# Patient Record
Sex: FEMALE | Race: BLACK OR AFRICAN AMERICAN | NOT HISPANIC OR LATINO | URBAN - METROPOLITAN AREA
[De-identification: names, ages, dates, MRNs, and addresses within clinical notes are randomized per-mention and may not be internally consistent; named-entity substitution may affect disease eponyms.]

---

## 2022-06-17 ENCOUNTER — INPATIENT (INPATIENT)
Facility: HOSPITAL | Age: 72
LOS: 93 days | Discharge: HOME | End: 2022-09-19
Attending: PSYCHIATRY & NEUROLOGY | Admitting: PSYCHIATRY & NEUROLOGY

## 2022-06-17 VITALS
RESPIRATION RATE: 17 BRPM | DIASTOLIC BLOOD PRESSURE: 84 MMHG | SYSTOLIC BLOOD PRESSURE: 162 MMHG | TEMPERATURE: 98 F | OXYGEN SATURATION: 100 % | HEART RATE: 77 BPM | HEIGHT: 65 IN

## 2022-06-17 DIAGNOSIS — F20.9 SCHIZOPHRENIA, UNSPECIFIED: ICD-10-CM

## 2022-06-17 LAB
ALBUMIN SERPL ELPH-MCNC: 3.9 G/DL — SIGNIFICANT CHANGE UP (ref 3.3–5)
ALP SERPL-CCNC: 86 U/L — SIGNIFICANT CHANGE UP (ref 40–120)
ALT FLD-CCNC: 11 U/L — SIGNIFICANT CHANGE UP (ref 4–33)
AMPHET UR-MCNC: NEGATIVE — SIGNIFICANT CHANGE UP
ANION GAP SERPL CALC-SCNC: 16 MMOL/L — HIGH (ref 7–14)
APAP SERPL-MCNC: <10 UG/ML — LOW (ref 15–25)
APPEARANCE UR: CLEAR — SIGNIFICANT CHANGE UP
AST SERPL-CCNC: 24 U/L — SIGNIFICANT CHANGE UP (ref 4–32)
B PERT DNA SPEC QL NAA+PROBE: SIGNIFICANT CHANGE UP
B PERT+PARAPERT DNA PNL SPEC NAA+PROBE: SIGNIFICANT CHANGE UP
BACTERIA # UR AUTO: NEGATIVE — SIGNIFICANT CHANGE UP
BARBITURATES UR SCN-MCNC: NEGATIVE — SIGNIFICANT CHANGE UP
BASOPHILS # BLD AUTO: 0.04 K/UL — SIGNIFICANT CHANGE UP (ref 0–0.2)
BASOPHILS NFR BLD AUTO: 0.8 % — SIGNIFICANT CHANGE UP (ref 0–2)
BENZODIAZ UR-MCNC: NEGATIVE — SIGNIFICANT CHANGE UP
BILIRUB SERPL-MCNC: 0.3 MG/DL — SIGNIFICANT CHANGE UP (ref 0.2–1.2)
BILIRUB UR-MCNC: NEGATIVE — SIGNIFICANT CHANGE UP
BORDETELLA PARAPERTUSSIS (RAPRVP): SIGNIFICANT CHANGE UP
BUN SERPL-MCNC: 15 MG/DL — SIGNIFICANT CHANGE UP (ref 7–23)
C PNEUM DNA SPEC QL NAA+PROBE: SIGNIFICANT CHANGE UP
CALCIUM SERPL-MCNC: 9.2 MG/DL — SIGNIFICANT CHANGE UP (ref 8.4–10.5)
CHLORIDE SERPL-SCNC: 104 MMOL/L — SIGNIFICANT CHANGE UP (ref 98–107)
CO2 SERPL-SCNC: 18 MMOL/L — LOW (ref 22–31)
COCAINE METAB.OTHER UR-MCNC: NEGATIVE — SIGNIFICANT CHANGE UP
COLOR SPEC: YELLOW — SIGNIFICANT CHANGE UP
CREAT SERPL-MCNC: 0.81 MG/DL — SIGNIFICANT CHANGE UP (ref 0.5–1.3)
CREATININE URINE RESULT, DAU: 161 MG/DL — SIGNIFICANT CHANGE UP
DIFF PNL FLD: NEGATIVE — SIGNIFICANT CHANGE UP
EGFR: 78 ML/MIN/1.73M2 — SIGNIFICANT CHANGE UP
EOSINOPHIL # BLD AUTO: 0.04 K/UL — SIGNIFICANT CHANGE UP (ref 0–0.5)
EOSINOPHIL NFR BLD AUTO: 0.8 % — SIGNIFICANT CHANGE UP (ref 0–6)
EPI CELLS # UR: 1 /HPF — SIGNIFICANT CHANGE UP (ref 0–5)
ETHANOL SERPL-MCNC: <10 MG/DL — SIGNIFICANT CHANGE UP
FLUAV SUBTYP SPEC NAA+PROBE: SIGNIFICANT CHANGE UP
FLUBV RNA SPEC QL NAA+PROBE: SIGNIFICANT CHANGE UP
GLUCOSE SERPL-MCNC: 95 MG/DL — SIGNIFICANT CHANGE UP (ref 70–99)
GLUCOSE UR QL: NEGATIVE — SIGNIFICANT CHANGE UP
HADV DNA SPEC QL NAA+PROBE: SIGNIFICANT CHANGE UP
HCOV 229E RNA SPEC QL NAA+PROBE: SIGNIFICANT CHANGE UP
HCOV HKU1 RNA SPEC QL NAA+PROBE: SIGNIFICANT CHANGE UP
HCOV NL63 RNA SPEC QL NAA+PROBE: SIGNIFICANT CHANGE UP
HCOV OC43 RNA SPEC QL NAA+PROBE: SIGNIFICANT CHANGE UP
HCT VFR BLD CALC: 35.8 % — SIGNIFICANT CHANGE UP (ref 34.5–45)
HGB BLD-MCNC: 11.3 G/DL — LOW (ref 11.5–15.5)
HMPV RNA SPEC QL NAA+PROBE: SIGNIFICANT CHANGE UP
HPIV1 RNA SPEC QL NAA+PROBE: SIGNIFICANT CHANGE UP
HPIV2 RNA SPEC QL NAA+PROBE: SIGNIFICANT CHANGE UP
HPIV3 RNA SPEC QL NAA+PROBE: SIGNIFICANT CHANGE UP
HPIV4 RNA SPEC QL NAA+PROBE: SIGNIFICANT CHANGE UP
HYALINE CASTS # UR AUTO: 0 /LPF — SIGNIFICANT CHANGE UP (ref 0–7)
IANC: 3.07 K/UL — SIGNIFICANT CHANGE UP (ref 1.8–7.4)
IMM GRANULOCYTES NFR BLD AUTO: 0.6 % — SIGNIFICANT CHANGE UP (ref 0–1.5)
KETONES UR-MCNC: NEGATIVE — SIGNIFICANT CHANGE UP
LEUKOCYTE ESTERASE UR-ACNC: ABNORMAL
LYMPHOCYTES # BLD AUTO: 1.2 K/UL — SIGNIFICANT CHANGE UP (ref 1–3.3)
LYMPHOCYTES # BLD AUTO: 24.6 % — SIGNIFICANT CHANGE UP (ref 13–44)
M PNEUMO DNA SPEC QL NAA+PROBE: SIGNIFICANT CHANGE UP
MCHC RBC-ENTMCNC: 29 PG — SIGNIFICANT CHANGE UP (ref 27–34)
MCHC RBC-ENTMCNC: 31.6 GM/DL — LOW (ref 32–36)
MCV RBC AUTO: 92 FL — SIGNIFICANT CHANGE UP (ref 80–100)
METHADONE UR-MCNC: NEGATIVE — SIGNIFICANT CHANGE UP
MONOCYTES # BLD AUTO: 0.5 K/UL — SIGNIFICANT CHANGE UP (ref 0–0.9)
MONOCYTES NFR BLD AUTO: 10.2 % — SIGNIFICANT CHANGE UP (ref 2–14)
NEUTROPHILS # BLD AUTO: 3.07 K/UL — SIGNIFICANT CHANGE UP (ref 1.8–7.4)
NEUTROPHILS NFR BLD AUTO: 63 % — SIGNIFICANT CHANGE UP (ref 43–77)
NITRITE UR-MCNC: NEGATIVE — SIGNIFICANT CHANGE UP
NRBC # BLD: 0 /100 WBCS — SIGNIFICANT CHANGE UP
NRBC # FLD: 0 K/UL — SIGNIFICANT CHANGE UP
OPIATES UR-MCNC: NEGATIVE — SIGNIFICANT CHANGE UP
OXYCODONE UR-MCNC: NEGATIVE — SIGNIFICANT CHANGE UP
PCP SPEC-MCNC: SIGNIFICANT CHANGE UP
PCP UR-MCNC: NEGATIVE — SIGNIFICANT CHANGE UP
PH UR: 7 — SIGNIFICANT CHANGE UP (ref 5–8)
PLATELET # BLD AUTO: 255 K/UL — SIGNIFICANT CHANGE UP (ref 150–400)
POTASSIUM SERPL-MCNC: 3.9 MMOL/L — SIGNIFICANT CHANGE UP (ref 3.5–5.3)
POTASSIUM SERPL-SCNC: 3.9 MMOL/L — SIGNIFICANT CHANGE UP (ref 3.5–5.3)
PROT SERPL-MCNC: 7.3 G/DL — SIGNIFICANT CHANGE UP (ref 6–8.3)
PROT UR-MCNC: ABNORMAL
RAPID RVP RESULT: SIGNIFICANT CHANGE UP
RBC # BLD: 3.89 M/UL — SIGNIFICANT CHANGE UP (ref 3.8–5.2)
RBC # FLD: 14 % — SIGNIFICANT CHANGE UP (ref 10.3–14.5)
RBC CASTS # UR COMP ASSIST: 1 /HPF — SIGNIFICANT CHANGE UP (ref 0–4)
RSV RNA SPEC QL NAA+PROBE: SIGNIFICANT CHANGE UP
RV+EV RNA SPEC QL NAA+PROBE: SIGNIFICANT CHANGE UP
SALICYLATES SERPL-MCNC: <0.3 MG/DL — LOW (ref 15–30)
SARS-COV-2 RNA SPEC QL NAA+PROBE: SIGNIFICANT CHANGE UP
SODIUM SERPL-SCNC: 138 MMOL/L — SIGNIFICANT CHANGE UP (ref 135–145)
SP GR SPEC: 1.03 — SIGNIFICANT CHANGE UP (ref 1–1.05)
THC UR QL: NEGATIVE — SIGNIFICANT CHANGE UP
TOXICOLOGY SCREEN, DRUGS OF ABUSE, SERUM RESULT: SIGNIFICANT CHANGE UP
TSH SERPL-MCNC: 1.04 UIU/ML — SIGNIFICANT CHANGE UP (ref 0.27–4.2)
UROBILINOGEN FLD QL: SIGNIFICANT CHANGE UP
WBC # BLD: 4.88 K/UL — SIGNIFICANT CHANGE UP (ref 3.8–10.5)
WBC # FLD AUTO: 4.88 K/UL — SIGNIFICANT CHANGE UP (ref 3.8–10.5)
WBC UR QL: 2 /HPF — SIGNIFICANT CHANGE UP (ref 0–5)

## 2022-06-17 PROCEDURE — 99285 EMERGENCY DEPT VISIT HI MDM: CPT | Mod: 25

## 2022-06-17 PROCEDURE — 93010 ELECTROCARDIOGRAM REPORT: CPT

## 2022-06-17 RX ORDER — OLANZAPINE 15 MG/1
2.5 TABLET, FILM COATED ORAL EVERY 6 HOURS
Refills: 0 | Status: DISCONTINUED | OUTPATIENT
Start: 2022-06-18 | End: 2022-06-24

## 2022-06-17 RX ORDER — OLANZAPINE 15 MG/1
2.5 TABLET, FILM COATED ORAL ONCE
Refills: 0 | Status: COMPLETED | OUTPATIENT
Start: 2022-06-17 | End: 2022-06-17

## 2022-06-17 RX ORDER — OLANZAPINE 15 MG/1
2.5 TABLET, FILM COATED ORAL AT BEDTIME
Refills: 0 | Status: DISCONTINUED | OUTPATIENT
Start: 2022-06-18 | End: 2022-06-19

## 2022-06-17 RX ORDER — OLANZAPINE 15 MG/1
1.25 TABLET, FILM COATED ORAL ONCE
Refills: 0 | Status: DISCONTINUED | OUTPATIENT
Start: 2022-06-18 | End: 2022-06-29

## 2022-06-17 RX ADMIN — OLANZAPINE 2.5 MILLIGRAM(S): 15 TABLET, FILM COATED ORAL at 21:07

## 2022-06-17 NOTE — ED BEHAVIORAL HEALTH ASSESSMENT NOTE - SUMMARY
Pt is a 72 year old woman, domiciled with daughter, no dependents, hx of paranoid schizophrenia, multiple prior psychiatric hospitalizations (most recent [facility] 4-5 months ago), hx of medication nonadherence, no hx of suicide attempts or suicidality, prior hx of aggression per daughter, no hx of substance abuse, BIB EMS activated by patient’s daughter when Pt was found throwing landlord's tools around home and verbally aggressive. Pt presents as very guarded and unreliable historian. MSE notable for an elevated, labile affect, +psychomotor agitation and involuntary movements (orofacial TD, repetitive fist clenching, finger and arm jerking), disorganized TP, preoccupied with paranoid beliefs that wolf is trying to get money from her and her daughter. Collateral information from daughter is concerning for imminent risk of harm to others given recent aggression towards landlord's tools, decline in ability to care for self (poor sleep and poor hygiene) in setting of medication nonadherence and paranoid delusions at baseline. Pt is at an imminent risk of harm to others and requires inpatient psychiatric hospitalization for stabilization.

## 2022-06-17 NOTE — ED BEHAVIORAL HEALTH ASSESSMENT NOTE - RISK ASSESSMENT
Pt has modifiable risk factors of active psychosis, aggression, agitation, noncompliant with meds, poor insight; static risk factors of multiple hospitalizations, hx of prior hospitalizations. Pt has protective factors of no hx of suicidality, no substance abuse. Pt is at an imminent risk of harm to others and requires inpatient psychiatric hospitalization for stabilization. Low Acute Suicide Risk

## 2022-06-17 NOTE — ED BEHAVIORAL HEALTH NOTE - BEHAVIORAL HEALTH NOTE
COVID Exposure Screen- collateral (i.e. third-party, chart review, belongings, etc; include EMS and ED staff), from Pt's daughter Ama Vargas ([phone])    1.	*Has the patient had a COVID-19 test in the last 90 days?  (  ) Yes   ( x ) No   (  ) Unknown- Reason: _____  IF YES PROCEED TO QUESTION #2. IF NO OR UNKNOWN, PLEASE SKIP TO QUESTION #3.  2.	Date of test(s) and result(s): ________  3.	*Has the patient tested positive for COVID-19 antibodies? (  ) Yes   ( x ) No   (  ) Unknown- Reason: _____  IF YES PROCEED TO QUESTION #4. IF NO or UNKNOWN, PLEASE SKIP TO QUESTION #5.  4.	Date of positive antibody test: ________  5.	*Has the patient received 2 doses of the COVID-19 vaccine? (  ) Yes   ( x ) No   (  ) Unknown- Reason: _____  IF YES PROCEED TO QUESTION #6. IF NO or UNKNOWN, PLEASE SKIP TO QUESTION #7.  6.	 Date of second dose: ________  7.	*In the past 10 days, has the patient been around anyone with a positive COVID-19 test?* (  ) Yes   ( x ) No   (  ) Unknown- Reason: __  IF YES PROCEED TO QUESTION #8. IF NO or UNKNOWN, PLEASE SKIP TO QUESTION #13.  8.	Was the patient within 6 feet of them for at least 15 minutes? (  ) Yes   (  ) No   (  ) Unknown- Reason: _____  9.	Did the patient provide care for them? (  ) Yes   (  ) No   (  ) Unknown- Reason: ______  10.	Did the patient have direct physical contact with them (touched, hugged, or kissed them)? (  ) Yes   (  ) No    (  ) Unknown- Reason: __  11.	Did the patient share eating or drinking utensils with them? (  ) Yes   (  ) No    (  ) Unknown- Reason: ____  12.	Did they sneeze, cough, or somehow get respiratory droplets on the patient? (  ) Yes   (  ) No    (  ) Unknown- Reason: ______  13.	*Has the patient been out of [place] within the past 10 days?* (  ) Yes   (  ) No   (  ) Unknown- Reason: _____  IF YES PLEASE ANSWER THE FOLLOWING QUESTIONS:  14.	Which state/country did they go to? ______  15.	Were they there over 24 hours? (  ) Yes   ( x ) No    (  ) Unknown- Reason: ______  16.	Date of return to [facility]: ______

## 2022-06-17 NOTE — ED PROVIDER NOTE - PROGRESS NOTE DETAILS
Rec'd signout on this pt from DEEPA Piña @ midnight:  73yo F w/ pmh paranoid schizophrenia, bibems for bizarre behavior, boarding for [facility] admission.  -Dr. Patricia

## 2022-06-17 NOTE — ED BEHAVIORAL HEALTH ASSESSMENT NOTE - VIOLENCE RISK FACTORS:
Feeling of being under threat and being unable to control threat/Impulsivity/Lack of insight into violence risk/need for treatment/Noncompliance with treatment

## 2022-06-17 NOTE — ED ADULT NURSE NOTE - NSIMPLEMENTINTERV_GEN_ALL_ED
Implemented All Fall Risk Interventions:  [place] to call system. Call bell, personal items and telephone within reach. Instruct patient to call for assistance. Room bathroom lighting operational. Non-slip footwear when patient is off stretcher. Physically safe environment: no spills, clutter or unnecessary equipment. Stretcher in lowest position, wheels locked, appropriate side rails in place. Provide visual cue, wrist band, yellow gown, etc. Monitor gait and stability. Monitor for mental status changes and reorient to person, place, and time. Review medications for side effects contributing to fall risk. Reinforce activity limits and safety measures with patient and family.

## 2022-06-17 NOTE — ED ADULT NURSE NOTE - OBJECTIVE STATEMENT
Pt arrived to  reporting that she became upset with her landlord because he didn't remove an extension cord and she almost tripped on it. Pt presents as cooperative, denies S/I H/I A/H V/H. Pt wanded for safety, changed into  clothing, personal property collected and logged.

## 2022-06-17 NOTE — ED PROVIDER NOTE - OBJECTIVE STATEMENT
71 y/o F hx Paranoia BIBA secondary to aggression and bizarre behaviour. Admits to medication non compliance.  Appears jv expressing. No evidence of  physical   injuries, broken skin or skin 71 y/o F hx Paranoia BIBA secondary to aggression and bizarre behaviour. Admits to medication non compliance.  Appears jv expressing. No evidence of  physical   injuries, broken skin or skin .  Denies SI/HI/AH/VH. Denies falling, punching or kicking any objects. Denies pain, SOB, fever, chills, cough, chest/ abdominal discomfort.  Denies use of alcohol or illicit drugs.

## 2022-06-17 NOTE — ED PROVIDER NOTE - CLINICAL SUMMARY MEDICAL DECISION MAKING FREE TEXT BOX
Medical evaluation performed. There is no clinical evidence of intoxication or any acute medical problem requiring immediate intervention. Patient is awaiting psychiatric consultation. Final disposition will be determined by psychiatrist. Labs, Urine Tox/UA , EKG  Medical evaluation performed. There is no clinical evidence of intoxication or any acute medical problem requiring immediate intervention. Patient is awaiting psychiatric consultation. Final disposition will be determined by psychiatrist.

## 2022-06-17 NOTE — ED BEHAVIORAL HEALTH ASSESSMENT NOTE - OTHER
involuntary movements, orofacial TD, fist closing, arm jerking did not assess, pt in bed pending bed availability [id number]

## 2022-06-17 NOTE — ED BEHAVIORAL HEALTH ASSESSMENT NOTE - PSYCHIATRIC ISSUES AND PLAN (INCLUDE STANDING AND PRN MEDICATION)
Start Zyprexa 2.5mg PO qhs for psychosis with least TD burden (consider Zyprexa Zydis at higher doses), melatonin 3mg qhs for sleep. PRNs for agitation: Zyprexa 2.5mg PO q6hr, Zyprexa 1.25 IM q6hr, Ativan 1mg PO q6hr (do not give IM Zyprexa with IM Ativan within 4 hrs due to risk of respiratory distress)

## 2022-06-17 NOTE — ED BEHAVIORAL HEALTH ASSESSMENT NOTE - OTHER PAST PSYCHIATRIC HISTORY (INCLUDE DETAILS REGARDING ONSET, COURSE OF ILLNESS, INPATIENT/OUTPATIENT TREATMENT)
multiple hospitalizations, no prior suicide attempts, daughter states last hospitalization 5-6 months ago in [place]

## 2022-06-17 NOTE — ED BEHAVIORAL HEALTH ASSESSMENT NOTE - DESCRIPTION
In the ED, Pt has been calm, talks loudly    Vital Signs Last 24 Hrs  T(C): 36.7 (17 Jun 2022 14:23), Max: 36.7 (17 Jun 2022 14:23)  T(F): 98 (17 Jun 2022 14:23), Max: 98 (17 Jun 2022 14:23)  HR: 77 (17 Jun 2022 14:23) (77 - 77)  BP: 162/84 (17 Jun 2022 14:23) (162/84 - 162/84)  BP(mean): --  RR: 17 (17 Jun 2022 14:23) (17 - 17)  SpO2: 100% (17 Jun 2022 14:23) (100% - 100%) lives with daughter denies

## 2022-06-17 NOTE — ED BEHAVIORAL HEALTH ASSESSMENT NOTE - CASE SUMMARY
72F with schizophrenia, non adherent to treatment, presents with EMS called by daughter for paranoia and agitation. Patient presents as loud, labile, tangential, though reasonably cooperative and tolerant of being interrupted for clarification. She reports issues with her landlord, thinking he is messing with her so her daughter gives him more money. Patient denies any psychiatric history or current symptoms. Per daughter, pt is not sleeping, hygiene is poor, and is increasingly paranoid and agitated, today began unplugging and throwing the landlord's power tools unprovoked. She cannot care for self due to psychosis warranting involuntary admission, pending merced unit assignment. I agree with note/plan above, recommend Zyprexa due to concern for other meds worsening TD/EPS. Does not require 1:1 in locked supervised setting at this time.

## 2022-06-17 NOTE — ED BEHAVIORAL HEALTH ASSESSMENT NOTE - HPI (INCLUDE ILLNESS QUALITY, SEVERITY, DURATION, TIMING, CONTEXT, MODIFYING FACTORS, ASSOCIATED SIGNS AND SYMPTOMS)
Pt is a 72 year old woman, domiciled with daughter, no dependents, hx of paranoid schizophrenia, multiple prior psychiatric hospitalizations (most recent [facility] 4-5 months ago), hx of medication nonadherence, no hx of suicide attempts or suicidality, prior hx of aggression per daughter, no hx of substance abuse, BIB EMS activated by patient’s daughter when Pt was found throwing landlord's tools around home and verbally aggressive.    On exam, Pt was AAOx3 however very guarded about circumstances prior to evaluation and a limited historian about her past psychiatric history. Pt presents with an elevated, labile affect, +psychomotor agitation and involuntary movements (orofacial TD, repetitive fist clenching, finger and arm jerking). Pt is preoccupied with paranoid beliefs that wolf is trying to get money from her and her daughter, however denies any physical altercations with him. Pt does not think landlord wants to kill her. She denies any violent ideation, intent, or plan towards him. Pt tells writer that she wants to get a 's license but landlord keeps her from doing so somehow, and that she cannot find her shoe. Pt reports mood as "happy". Denies feeling depressed or anxious. Denies any sleep or appetite changes. Did not report any jv sx or perceptual disturbances. Denies any suicidal ideation, intent, or plan. Pt denies prior inpatient psych admissions, outpatient psychiatric tx. She denies med trials, but mentions "they tried to make me takes meds" then declined to explain this. When asked about her social history, Pt states she did not want to specify what benefits she receives, or where she is from besides from the [facility]. States she takes care of her ADLs, pays her own bills, cooks and showers. Denies any physical pain, Pt explains her involuntary movements are due to her feeling tired.     Collateral information from daughter provides contradicting history, see separate  note, of note: "Daughter reports when patient has an episode, she screams, belligerent, is confrontational and was carrying on being verbally aggressive. Daughter says the landlord called her daughter because pt was screaming at the top of her lungs throwing the landlord supplies and tools throwing it around. Patient was saying that the landlord is trying to hurt her. Daughter says patient has these episodes but the patient was not able to be redirected." Confirms that Pt is noncompliant with medications for years, and tends to discontinue meds whenever discharged.

## 2022-06-17 NOTE — ED BEHAVIORAL HEALTH NOTE - BEHAVIORAL HEALTH NOTE
As per request of provider, writer contacted patient’s daughter Ama Vargas ([phone]) for collateral information. The following information is per daughter.    Patient is a 71 Yo female domiciled with daughter. Patient BIB EMS activated by patient’s daughter due to patient experiencing an episode that was not redirectable. Daughter reports when patient has an episode, she screams, belligerent, is confrontational and was carrying on being verbally aggressive. Daughter says the landlord called her daughter because pt was screaming at the top of her lungs throwing the landlord supplies and tools throwing it around. Patient was saying that the landlord is trying to hurt her. Daughter says patient has these episodes but the patient was not able to be redirected. Patient has a hx of paranoid schizophrenia and is non compliant with treatment/mediation. Daughter says the patient has these conspiracy theories and says people are plotting against her trying to attack her. Patient has a hx of hospitalizations with most recent hospitalization at [facility] 4-5 months ago. Daughter says whenever the patient leaves the hospital she throws out her meds and the daughter isn’t able to administer the patients medication.  Daughter says patient has not been in outpatient treatment in years. Daughter reports the patient did not endorse any Si/Hi/AVH but did state to her landlord “you don’t want to mess with me”. Daughter says the patient has a hx of aggression. Patient’s sleep is poor, hygiene is poor and appetite is normal. Daughter says she is unsure if the patient has any medical problems because she refuses to see a doctor. Daughter notices the patient vomiting sometimes after she eats. Patient is not covid vaccinated. Daughter is hopeful for the patient to get a medical workup and believes patient needs psych meds. When patient is on medication, she is still paranoid but not aggressive. Writer agreed to keep the daughter updated. As per request of provider, writer contacted patient’s daughter Ama Vargas ([phone]) for collateral information. The following information is per daughter.    Patient is a 73 Yo female domiciled with daughter. Patient BIB EMS activated by patient’s daughter due to patient experiencing an episode that was not redirectable. Daughter reports when patient has an episode, she screams, belligerent, is confrontational and was carrying on being verbally aggressive. Daughter says the landlord called her daughter because pt was screaming at the top of her lungs throwing the landlord supplies and tools throwing it around. Patient was saying that the landlord is trying to hurt her. Daughter says patient has these episodes but the patient was not able to be redirected. Patient has a hx of paranoid schizophrenia and is non compliant with treatment/mediation. Daughter says the patient has these conspiracy theories and says people are plotting against her trying to attack her. Patient has a hx of hospitalizations with most recent hospitalization at [facility] 4-5 months ago. Daughter says whenever the patient leaves the hospital she throws out her meds and the daughter isn’t able to administer the patients medication.  Daughter says patient has not been in outpatient treatment in years. Daughter reports the patient did not endorse any Si/Hi/AVH but did state to her landlord “you don’t want to mess with me”. Daughter says the patient has a hx of aggression. Patient’s sleep is poor, hygiene is poor and appetite is normal. Daughter says she is unsure if the patient has any medical problems because she refuses to see a doctor. Daughter notices the patient vomiting sometimes after she eats. Patient is not covid vaccinated. Daughter is hopeful for the patient to get a medical workup and believes patient needs psych meds. When patient is on medication, she is still paranoid but not aggressive. Writer informed the daughter that patient would be admitted psychiatrically and is currently boarding.

## 2022-06-17 NOTE — ED ADULT TRIAGE NOTE - CHIEF COMPLAINT QUOTE
Pt from home hx of paranoid schizophrenia non compliant with medication brought in by EMS after daughter called starting pt has been acting "irate" As per EMS pt was uncooperative. Pt calm  in triage. Pt denies si/hi/vh/ah

## 2022-06-17 NOTE — ED BEHAVIORAL HEALTH ASSESSMENT NOTE - NSSUICPROTFACT_PSY_ALL_CORE
Physical Therapy & Occupational Therapy  Attempt Note/No Treatment  Pt gone for EGD with anethesia at this time. Will follow up 9/29 as able.     Galilea Smith, [place] [address]  Cassandra Stevenson Responsibility to children, family, or others/Identifies reasons for living/Supportive social network of family or friends

## 2022-06-18 DIAGNOSIS — F29 UNSPECIFIED PSYCHOSIS NOT DUE TO A SUBSTANCE OR KNOWN PHYSIOLOGICAL CONDITION: ICD-10-CM

## 2022-06-18 LAB
COVID-19 SPIKE DOMAIN AB INTERP: POSITIVE
COVID-19 SPIKE DOMAIN ANTIBODY RESULT: 74.5 U/ML — HIGH
SARS-COV-2 IGG+IGM SERPL QL IA: 74.5 U/ML — HIGH
SARS-COV-2 IGG+IGM SERPL QL IA: POSITIVE

## 2022-06-18 PROCEDURE — 99221 1ST HOSP IP/OBS SF/LOW 40: CPT

## 2022-06-18 NOTE — BH INPATIENT PSYCHIATRY ASSESSMENT NOTE - HPI (INCLUDE ILLNESS QUALITY, SEVERITY, DURATION, TIMING, CONTEXT, MODIFYING FACTORS, ASSOCIATED SIGNS AND SYMPTOMS)
Pt is a 72 year old woman, domiciled with daughter, no dependents, no known past medical history, no substance abuse,  hx of paranoid schizophrenia, multiple prior psychiatric hospitalizations (most recent [facility] 4-5 months ago), hx of medication nonadherence, no hx of suicide attempts or suicidality, prior hx of aggression per daughter, no hx of substance abuse, BIB EMS activated by patient’s daughter when Pt was found throwing landlord's tools around home and verbally aggressive.       Pt is a 72 year old woman, domiciled with daughter, no dependents, no known past medical history, no substance abuse,  hx of paranoid schizophrenia, multiple prior psychiatric hospitalizations (was seen in [facility] ED 2016 with presentation very similar to current, possibly more recent hospitalization at [facility]  several months ago), hx of medication nonadherence, no hx of suicide attempts or suicidality, prior hx of aggression per daughter, BIB EMS to [facility] 6/17 activated by patient’s daughter when pt was found throwing [employer]'s tools around home and verbally aggressive. Collateral reported patient non-adherent with treatment, increasingly psychotic. Patient was calm in the ED, but delusional, believing that landlord has been trying to prevent her from getting a 's license, other   delusions of control. Was admitted on 9.39, olanzapine 2.5 mg QHS started.

## 2022-06-18 NOTE — BH INPATIENT PSYCHIATRY ASSESSMENT NOTE - CURRENT MEDICATION
MEDICATIONS  (STANDING):  OLANZapine 2.5 milliGRAM(s) Oral at bedtime    MEDICATIONS  (PRN):  LORazepam     Tablet 1 milliGRAM(s) Oral every 6 hours PRN Agitation  OLANZapine 2.5 milliGRAM(s) Oral every 6 hours PRN agitation  OLANZapine Injectable 1.25 milliGRAM(s) IntraMuscular Once PRN severe agitation

## 2022-06-18 NOTE — PSYCHIATRIC REHAB INITIAL EVALUATION - NSBHPRRECOMMEND_PSY_ALL_CORE
Per medical records, Pt is a 72 year old woman, domiciled with daughter, with an admitting diagnosis of schizophrenia. Pt has a history of multiple psychiatric admissions, most recently at [facility] 4-5 months ago. Pt has a prior hx of aggression per collateral of daughter. Pt was BIB EMS which was activated by patient’s daughter due to verbal aggression and pt throwing landlord's tools around home. Pt presents with noted psychomotor agitation and involuntary movements (orofacial TD, repetitive fist clenching, finger and arm jerking). Writer met with patient and introduced self, psychiatric rehabilitation staff and services.  Patient was noted to be disorganized, hyperverbal, and focused on discharge home. She reports no plans to take medication, often stating she is fine. Pt expressed paranoia towards landlord.  Patient demonstrated poor impulse control during assessment, displayed poor insight and poor judgment into her symptoms and treatment at this time.  Writer encouraged patient to attend psychiatric rehabilitation activities and engage in treatment.  Psychiatric Rehabilitation staff will continue to engage patient daily in order to develop therapeutic rapport. Writer and patient were unable to establish a collaborative psychiatric rehabilitation goal, therefore one will be selected for her.

## 2022-06-18 NOTE — BH INPATIENT PSYCHIATRY ASSESSMENT NOTE - MSE UNSTRUCTURED FT
Patient is awake and alert. Affect is very guarded. Speech is fluent. Somewhat restless, also athetosis? truncal TD? Speech fluent. +paranoid.   Insists that landlord wants to hurt her. Reports "I don't need any medications." Poor insight. No suicidal ideations.

## 2022-06-18 NOTE — BH INPATIENT PSYCHIATRY ASSESSMENT NOTE - NSBHASSESSSUMMFT_PSY_ALL_CORE
72 year-old with paranoid schizophrenia and possibly TD with acute psychotic decompensation 72 year-old with no past medical history, no substance misuse, history of paranoid schizophrenia and possibly TD with acute psychotic decompensation  in the context of medication non-adherence, admitted on 9.39 to [facility], olanzapine 2.5 mg HS started  Will continue olanzapine, increase to 2.5 bid tomorrow

## 2022-06-18 NOTE — BH INPATIENT PSYCHIATRY ASSESSMENT NOTE - OTHER PAST PSYCHIATRIC HISTORY (INCLUDE DETAILS REGARDING ONSET, COURSE OF ILLNESS, INPATIENT/OUTPATIENT TREATMENT)
multiple hospitalizations, no prior suicide attempts, daughter states last hospitalization 5-6 months ago in [place]

## 2022-06-18 NOTE — BH INPATIENT PSYCHIATRY ASSESSMENT NOTE - NSBHMETABOLIC_PSY_ALL_CORE_FT
BMI: BMI (kg/m2): 19.4 (06-18-22 @ 09:59)  HbA1c:   Glucose:   BP: 165/77 (06-18-22 @ 09:59) (153/92 - 165/77)  Lipid Panel:

## 2022-06-18 NOTE — BH INPATIENT PSYCHIATRY ASSESSMENT NOTE - NSBHCHARTREVIEWVS_PSY_A_CORE FT
Vital Signs Last 24 Hrs  T(C): 35.9 (06-18-22 @ 15:59), Max: 36.9 (06-17-22 @ 19:00)  T(F): 96.6 (06-18-22 @ 15:59), Max: 98.4 (06-17-22 @ 19:00)  HR: 65 (06-18-22 @ 09:59) (57 - 66)  BP: 165/77 (06-18-22 @ 09:59) (153/92 - 165/77)  BP(mean): --  RR: 18 (06-18-22 @ 09:59) (16 - 18)  SpO2: 100% (06-18-22 @ 06:50) (98% - 100%)     Vital Signs Last 24 Hrs  T(C): 35.9 (06-18-22 @ 15:59), Max: 36.9 (06-18-22 @ 06:50)  T(F): 96.6 (06-18-22 @ 15:59), Max: 98.4 (06-18-22 @ 06:50)  HR: 65 (06-18-22 @ 09:59) (57 - 65)  BP: 165/77 (06-18-22 @ 09:59) (158/87 - 165/77)  BP(mean): --  RR: 18 (06-18-22 @ 09:59) (16 - 18)  SpO2: 100% (06-18-22 @ 06:50) (100% - 100%)

## 2022-06-18 NOTE — BH INPATIENT PSYCHIATRY ASSESSMENT NOTE - RISK ASSESSMENT
Pt has modifiable risk factors of active psychosis, aggression, agitation, noncompliant with meds, poor insight; static risk factors of multiple hospitalizations, hx of prior hospitalizations. Pt has protective factors of no hx of suicidality, no substance abuse. Pt is at an imminent risk of harm to others and requires inpatient psychiatric hospitalization for stabilization.

## 2022-06-18 NOTE — BH INPATIENT PSYCHIATRY ASSESSMENT NOTE - NSBHCONSULTIPREASON_PSY_A_CORE
danger to self; mental illness expected to respond to inpatient care <<-----Click here for Discharge Medication Review

## 2022-06-18 NOTE — PSYCHIATRIC REHAB INITIAL EVALUATION - NSBHSTRENGTHHOME_PSY_ALL_CORE
Pt reports being independent with all daily activities including shopping and cooking.  However, per chart/collateral of daughter, pt has experienced a decline in her ability to care for self (poor sleep and poor hygiene).

## 2022-06-18 NOTE — BH PATIENT PROFILE - NSDASAVERBSTAFF_PSY_ALL_CORE
87 yo M with a PMH ESRD on HD (MWF), AAA s/p repair, CAD s/p stents (lastplaced in 03/2014), atrial fibrillation (not on A/C 2/2 falls), HFpEF (last EF 68%) s/p PPM and ablation 2016, severe aortic stenosis, severe mitral regurgitation, depression, hypothyroidism, HTN, and DLBCL currently on Rituximab, recently admitted (9/17) for increased fluid leakage from pleurx catheter p/w fall/?syncope at home with hypotension, a/w likely septic shock 2/2 empyema, c/b MRSA bacteremia.     Neuro: AMS 2/2 sepsis/metabolic derangement. Currently improved, but mental status continues to wax and wane, likely 2/2 delirium. PT did not require zyprexa or sedation overnight.     - treat underlying infection w/ Vanc and Zosyn (renally dosed)  - Consider Zyprexa if pt becomes agitated  - Monitor neuro status  - avoid sedating agents    Resp: Currently on room air; Pt placed on 2L NC while sleeping; persistent pleural effusions.   - pleurex gram stain + GPC; awaiting culture data   - Pleurex cath found out, site bandaged, with minor fluid leakage, c/t monitor site   - Per thoracic, consider sonosite for possible pigtail cath placement for pleural drainage  - dc'd Symbicort and Spiriva and switched to Pulmicort due to Pt's inability to follow commands yesterday; consider switching back to spiriva/symbicort, incentive spirometry, Chest PT, keep O2 sat 90%  - Daily CXR    CV:   - septic shock 2/2 MRSA bacteremia, likely originating from infected pleural fluid; c/w levo to maintain MAP>65, increased midodrine to 30TID, Pt now off of Levo  - pt with severe AS and MR on previous TTE with severe pulm HTN - gentle IVF as needed only to prevent pulmonary edema  - Afib s/p PPM (controlled, not on AC due to falls, not on BB)  - No vegetations found on TTE      GI:   - c/w renal diet, tolerating well   - Continue to monitor mental status for risk of aspiration.    Renal: ESRD on HD  - HD per renal  - Per renal recs: continue epogen 4000 units TIW     ID:   - septic shock 2/2 MRSA bacteremia, likely source is empyema   - c/w Vanc/ Zosyn IVPB (day 5), will consider dcing zosyn pending results of pleural cx  - pleural gram stain +GPCs, cx pending     Heme/Onc:   - leukocytosis resolved  - pt with chronic anemia, likely 2/2 anemia of chronic disease/ESRD - c/t monitor, c/w epogen; transfuse Hb <7.0  - mild thrombocytopenia, likely in setting of sepsis - c/t monitor      - DLBCL - f/u heme/onc as outpatient    Psych: A&O x1  - continue to monitor mental status.   - avoid sedation    DVT:   - Hep Sq  - SCDs No

## 2022-06-18 NOTE — ED BEHAVIORAL HEALTH NOTE - BEHAVIORAL HEALTH NOTE
Pt comfortable and sleeping, no PRNS given. She received a standing dose of Zyprexa 2.5mg po at 9:07pm.     Vital Signs Last 24 Hrs  T(C): 36.9 (17 Jun 2022 19:00), Max: 36.9 (17 Jun 2022 19:00)  T(F): 98.4 (17 Jun 2022 19:00), Max: 98.4 (17 Jun 2022 19:00)  HR: 66 (17 Jun 2022 19:00) (66 - 77)  BP: 153/92 (17 Jun 2022 19:00) (153/92 - 162/84)  BP(mean): --  RR: 16 (17 Jun 2022 19:00) (16 - 17)  SpO2: 98% (17 Jun 2022 19:00) (98% - 100%)    Patient will be transferred to  at [facility] at 7am on 9.39.   No 1:1 needed.   Hold orders placed.   RN to give report prior to transfer.

## 2022-06-19 LAB
A1C WITH ESTIMATED AVERAGE GLUCOSE RESULT: 5.9 % — HIGH (ref 4–5.6)
CHOLEST SERPL-MCNC: 165 MG/DL — SIGNIFICANT CHANGE UP
ESTIMATED AVERAGE GLUCOSE: 123 — SIGNIFICANT CHANGE UP
HDLC SERPL-MCNC: 64 MG/DL — SIGNIFICANT CHANGE UP
LIPID PNL WITH DIRECT LDL SERPL: 89 MG/DL — SIGNIFICANT CHANGE UP
NON HDL CHOLESTEROL: 101 MG/DL — SIGNIFICANT CHANGE UP
TRIGL SERPL-MCNC: 58 MG/DL — SIGNIFICANT CHANGE UP

## 2022-06-19 PROCEDURE — 99231 SBSQ HOSP IP/OBS SF/LOW 25: CPT

## 2022-06-19 RX ORDER — OLANZAPINE 15 MG/1
2.5 TABLET, FILM COATED ORAL
Refills: 0 | Status: DISCONTINUED | OUTPATIENT
Start: 2022-06-19 | End: 2022-06-29

## 2022-06-19 NOTE — BH INPATIENT PSYCHIATRY PROGRESS NOTE - PRN MEDS
MEDICATIONS  (PRN):  LORazepam     Tablet 1 milliGRAM(s) Oral every 6 hours PRN Agitation  OLANZapine 2.5 milliGRAM(s) Oral every 6 hours PRN agitation  OLANZapine Injectable 1.25 milliGRAM(s) IntraMuscular Once PRN severe agitation
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
## 2022-06-19 NOTE — BH INPATIENT PSYCHIATRY PROGRESS NOTE - NSBHASSESSSUMMFT_PSY_ALL_CORE
72 year-old with history of schizophrenia with poor medication adherence presenting with paranoid ideations concerning landlord that are consistent  with previous presentations, remains paranoid, mildly orthostatic  Encourage po intake  Increase olanzapine to 2.5 mg bid for psychosis

## 2022-06-19 NOTE — BH INPATIENT PSYCHIATRY PROGRESS NOTE - NSBHMETABOLIC_PSY_ALL_CORE_FT
BMI: BMI (kg/m2): 19.4 (06-18-22 @ 09:59)  HbA1c:   Glucose:   BP: 165/77 (06-18-22 @ 09:59) (153/92 - 165/77)  Lipid Panel: Date/Time: 06-19-22 @ 08:45  Cholesterol, Serum: 165  Direct LDL: --  HDL Cholesterol, Serum: 64  Total Cholesterol/HDL Ration Measurement: --  Triglycerides, Serum: 58

## 2022-06-19 NOTE — BH INPATIENT PSYCHIATRY PROGRESS NOTE - CURRENT MEDICATION
MEDICATIONS  (STANDING):  OLANZapine 2.5 milliGRAM(s) Oral two times a day    MEDICATIONS  (PRN):  LORazepam     Tablet 1 milliGRAM(s) Oral every 6 hours PRN Agitation  OLANZapine 2.5 milliGRAM(s) Oral every 6 hours PRN agitation  OLANZapine Injectable 1.25 milliGRAM(s) IntraMuscular Once PRN severe agitation

## 2022-06-19 NOTE — BH INPATIENT PSYCHIATRY PROGRESS NOTE - NSBHCHARTREVIEWVS_PSY_A_CORE FT
Vital Signs Last 24 Hrs  T(C): 36.7 (06-19-22 @ 08:29), Max: 36.7 (06-19-22 @ 08:29)  T(F): 98 (06-19-22 @ 08:29), Max: 98 (06-19-22 @ 08:29)  HR: --  BP: --  BP(mean): --  RR: --  SpO2: 99% (06-19-22 @ 08:29) (99% - 99%)    Orthostatic VS  06-19-22 @ 08:29  Lying BP: 116/89 HR: 67  Sitting BP: 144/69 HR: 84  Standing BP: --/-- HR: --  Site: --  Mode: --  Orthostatic VS  06-18-22 @ 21:00  Lying BP: --/-- HR: --  Sitting BP: 138/76 HR: 64  Standing BP: 118/87 HR: 68  Site: --  Mode: --

## 2022-06-19 NOTE — BH INPATIENT PSYCHIATRY PROGRESS NOTE - NSBHFUPINTERVALHXFT_PSY_A_CORE
Patient remains disorganized, paranoid about her landlord, not agitated or aggressive, mildly orthostatic but not complaining of dizziness

## 2022-06-20 DIAGNOSIS — G24.01 DRUG INDUCED SUBACUTE DYSKINESIA: ICD-10-CM

## 2022-06-20 PROCEDURE — 99233 SBSQ HOSP IP/OBS HIGH 50: CPT

## 2022-06-20 NOTE — BH SOCIAL WORK INITIAL PSYCHOSOCIAL EVALUATION - NSBHBARRIERS_PSY_ALL_CORE
Lack of insight/Poor judgement Lack of insight/Low motivation/Non-compliant with treatment/Poor judgement

## 2022-06-20 NOTE — BH SOCIAL WORK INITIAL PSYCHOSOCIAL EVALUATION - NSBHTREATHXDATEFT_PSY_ALL_CORE
daughter states yearly in patient and various ER visits to [facility] and [facility]  No dates available

## 2022-06-20 NOTE — BH INPATIENT PSYCHIATRY PROGRESS NOTE - MSE UNSTRUCTURED FT
Appearance: Thin [place], African american female with short grey hair who is poorly groomed with poor hygiene (foul smelling). Patient presents with severe tardive dyskenisia (chronic) involving upper arms (worsened when walking), toungue/lips  Behavior: Disorganized behavior, seen talking to herself. Tends to ask same question again and again, following staff requesting discharge.   Speech: Patient is difficult to understand due to TD involving lips/toungue. Presents with pressured speech, difficult to interrupt.    Thought process: Illogical, disorganized in thought process and difficult to follow. perseverates about landlord harassing her.   Thought content: denies SIIP or HIIP. Delusions about landlord harassing her (severe in intensity/frequency - does not discuss any other topics with staff and perseverated over this delusion)  Mood: "I'm fine"  Affect: Labile, Range was Flat. Congruent with mood.  Insight: Limited at baseline   Judgement: Poor  Impulse control: Poor

## 2022-06-20 NOTE — BH INPATIENT PSYCHIATRY PROGRESS NOTE - NSBHCHARTREVIEWVS_PSY_A_CORE FT
Vital Signs Last 24 Hrs  T(C): 36 (06-20-22 @ 08:02), Max: 36.2 (06-19-22 @ 21:04)  T(F): 96.8 (06-20-22 @ 08:02), Max: 97.2 (06-19-22 @ 21:04)  HR: 75 (06-19-22 @ 21:04) (75 - 75)  BP: 140/58 (06-19-22 @ 21:04) (140/58 - 140/58)  BP(mean): --  RR: --  SpO2: 99% (06-19-22 @ 21:04) (99% - 99%)    Orthostatic VS  06-20-22 @ 08:02  Lying BP: --/-- HR: --  Sitting BP: 133/75 HR: 79  Standing BP: 131/68 HR: 89  Site: upper right arm  Mode: electronic  Orthostatic VS  06-19-22 @ 08:29  Lying BP: 116/89 HR: 67  Sitting BP: 144/69 HR: 84  Standing BP: --/-- HR: --  Site: --  Mode: --  Orthostatic VS  06-18-22 @ 21:00  Lying BP: --/-- HR: --  Sitting BP: 138/76 HR: 64  Standing BP: 118/87 HR: 68  Site: --  Mode: --   Vital Signs Last 24 Hrs  T(C): 37.5 (06-20-22 @ 15:52), Max: 37.5 (06-20-22 @ 15:52)  T(F): 99.5 (06-20-22 @ 15:52), Max: 99.5 (06-20-22 @ 15:52)  HR: 75 (06-19-22 @ 21:04) (75 - 75)  BP: 140/58 (06-19-22 @ 21:04) (140/58 - 140/58)  BP(mean): --  RR: --  SpO2: 99% (06-19-22 @ 21:04) (99% - 99%)    Orthostatic VS  06-20-22 @ 08:02  Lying BP: --/-- HR: --  Sitting BP: 133/75 HR: 79  Standing BP: 131/68 HR: 89  Site: upper right arm  Mode: electronic  Orthostatic VS  06-19-22 @ 08:29  Lying BP: 116/89 HR: 67  Sitting BP: 144/69 HR: 84  Standing BP: --/-- HR: --  Site: --  Mode: --  Orthostatic VS  06-18-22 @ 21:00  Lying BP: --/-- HR: --  Sitting BP: 138/76 HR: 64  Standing BP: 118/87 HR: 68  Site: --  Mode: --
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
## 2022-06-20 NOTE — BH INPATIENT PSYCHIATRY PROGRESS NOTE - NSBHMETABOLIC_PSY_ALL_CORE_FT
BMI: BMI (kg/m2): 19.4 (06-18-22 @ 09:59)  HbA1c: A1C with Estimated Average Glucose Result: 5.9 % (06-19-22 @ 08:45)    Glucose:   BP: 140/58 (06-19-22 @ 21:04) (140/58 - 165/77)  Lipid Panel: Date/Time: 06-19-22 @ 08:45  Cholesterol, Serum: 165  Direct LDL: --  HDL Cholesterol, Serum: 64  Total Cholesterol/HDL Ration Measurement: --  Triglycerides, Serum: 58

## 2022-06-20 NOTE — ED BEHAVIORAL HEALTH NOTE - BEHAVIORAL HEALTH NOTE
Worker called [employer] ([phone]) and spoke to Cindy PEREZ who authorized days of 6/18-6/20 with review on 6/21 and reviewer to be determined. Auth #[id number].
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
## 2022-06-21 PROCEDURE — 99233 SBSQ HOSP IP/OBS HIGH 50: CPT

## 2022-06-21 NOTE — PHYSICAL THERAPY INITIAL EVALUATION ADULT - PLANNED THERAPY INTERVENTIONS, PT EVAL
balance training/gait training/motor coordination training/neuromuscular re-education/strengthening/transfer training

## 2022-06-21 NOTE — BH INPATIENT PSYCHIATRY PROGRESS NOTE - NSBHMETABOLIC_PSY_ALL_CORE_FT
BMI: BMI (kg/m2): 19.4 (06-18-22 @ 09:59)  HbA1c: A1C with Estimated Average Glucose Result: 5.9 % (06-19-22 @ 08:45)    Glucose:   BP: 140/58 (06-19-22 @ 21:04) (140/58 - 140/58)  Lipid Panel: Date/Time: 06-19-22 @ 08:45  Cholesterol, Serum: 165  Direct LDL: --  HDL Cholesterol, Serum: 64  Total Cholesterol/HDL Ration Measurement: --  Triglycerides, Serum: 58
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
## 2022-06-21 NOTE — BH INPATIENT PSYCHIATRY PROGRESS NOTE - NSBHCHARTREVIEWVS_PSY_A_CORE FT
Vital Signs Last 24 Hrs  T(C): 36.2 (06-21-22 @ 08:08), Max: 37.5 (06-20-22 @ 15:52)  T(F): 97.2 (06-21-22 @ 08:08), Max: 99.5 (06-20-22 @ 15:52)  HR: --  BP: --  BP(mean): --  RR: --  SpO2: --    Orthostatic VS  06-21-22 @ 08:08  Lying BP: --/-- HR: --  Sitting BP: 117/74 HR: 83  Standing BP: --/-- HR: --  Site: --  Mode: --  Orthostatic VS  06-20-22 @ 20:42  Lying BP: --/-- HR: --  Sitting BP: 125/81 HR: 71  Standing BP: --/-- HR: --  Site: --  Mode: --  Orthostatic VS  06-20-22 @ 08:02  Lying BP: --/-- HR: --  Sitting BP: 133/75 HR: 79  Standing BP: 131/68 HR: 89  Site: upper right arm  Mode: electronic   Vital Signs Last 24 Hrs  T(C): 36.5 (06-21-22 @ 15:36), Max: 36.5 (06-21-22 @ 15:36)  T(F): 97.7 (06-21-22 @ 15:36), Max: 97.7 (06-21-22 @ 15:36)  HR: --  BP: --  BP(mean): --  RR: --  SpO2: --    Orthostatic VS  06-21-22 @ 20:36  Lying BP: --/-- HR: --  Sitting BP: 150/109 HR: 77  Standing BP: 144/73 HR: 85  Site: --  Mode: --  Orthostatic VS  06-21-22 @ 08:08  Lying BP: --/-- HR: --  Sitting BP: 117/74 HR: 83  Standing BP: --/-- HR: --  Site: --  Mode: --  Orthostatic VS  06-20-22 @ 20:42  Lying BP: --/-- HR: --  Sitting BP: 125/81 HR: 71  Standing BP: --/-- HR: --  Site: --  Mode: --  Orthostatic VS  06-20-22 @ 08:02  Lying BP: --/-- HR: --  Sitting BP: 133/75 HR: 79  Standing BP: 131/68 HR: 89  Site: upper right arm  Mode: electronic
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
## 2022-06-21 NOTE — BH INPATIENT PSYCHIATRY PROGRESS NOTE - NSCGISEVERILLNESS_PSY_ALL_CORE
5 = Markedly ill - intrusive symptoms that distinctly impair social/occupational function or cause intrusive levels of distress 7 = Among the most extremely ill patients – pathology drastically interferes in many life functions; may be hospitalized

## 2022-06-21 NOTE — PHYSICAL THERAPY INITIAL EVALUATION ADULT - IMPAIRMENTS CONTRIBUTING TO GAIT DEVIATIONS, PT EVAL
ataxic/impaired balance/impaired coordination/impaired motor control/impaired postural control/decreased strength Improved.
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
## 2022-06-21 NOTE — DIETITIAN INITIAL EVALUATION ADULT - OTHER INFO
Pt presents to [facility] ED with Paranoia and agitation. Admitted to [facility] with diagnosis of Psychosis. Met Pt in her room. Pt not attentive to questions and states " I need to talk to SW" Unable to interview Pt. Per RN, Pt with fair po intake, she is always pacing around the unit. No GI distress noted.

## 2022-06-22 PROCEDURE — 99233 SBSQ HOSP IP/OBS HIGH 50: CPT
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
## 2022-06-22 NOTE — BH TREATMENT PLAN - NSTXCAREGIVERPARTICIPATE_PSY_P_CORE
Family/Caregiver participated in identification of needs/problems/goals for treatment Family/Caregiver participated in identification of needs/problems/goals for treatment/No, patient unwilling to involve family/caregiver

## 2022-06-22 NOTE — BH INPATIENT PSYCHIATRY PROGRESS NOTE - NSBHCHARTREVIEWVS_PSY_A_CORE FT
Vital Signs Last 24 Hrs  T(C): 36.5 (06-22-22 @ 08:16), Max: 36.5 (06-21-22 @ 15:36)  T(F): 97.7 (06-22-22 @ 08:16), Max: 97.7 (06-21-22 @ 15:36)  HR: --  BP: --  BP(mean): --  RR: 17 (06-22-22 @ 08:16) (17 - 17)  SpO2: --    Orthostatic VS  06-22-22 @ 08:16  Lying BP: --/-- HR: --  Sitting BP: 136/82 HR: 79  Standing BP: 122/83 HR: 89  Site: --  Mode: --  Orthostatic VS  06-21-22 @ 20:36  Lying BP: --/-- HR: --  Sitting BP: 150/109 HR: 77  Standing BP: 144/73 HR: 85  Site: --  Mode: --  Orthostatic VS  06-21-22 @ 08:08  Lying BP: --/-- HR: --  Sitting BP: 117/74 HR: 83  Standing BP: --/-- HR: --  Site: --  Mode: --  Orthostatic VS  06-20-22 @ 20:42  Lying BP: --/-- HR: --  Sitting BP: 125/81 HR: 71  Standing BP: --/-- HR: --  Site: --  Mode: --   Vital Signs Last 24 Hrs  T(C): 37.6 (06-22-22 @ 15:38), Max: 37.6 (06-22-22 @ 15:38)  T(F): 99.6 (06-22-22 @ 15:38), Max: 99.6 (06-22-22 @ 15:38)  HR: --  BP: --  BP(mean): --  RR: 17 (06-22-22 @ 08:16) (17 - 17)  SpO2: --    Orthostatic VS  06-22-22 @ 20:30  Lying BP: --/-- HR: --  Sitting BP: 137/97 HR: 72  Standing BP: --/-- HR: --  Site: --  Mode: --  Orthostatic VS  06-22-22 @ 08:16  Lying BP: --/-- HR: --  Sitting BP: 136/82 HR: 79  Standing BP: 122/83 HR: 89  Site: --  Mode: --  Orthostatic VS  06-21-22 @ 20:36  Lying BP: --/-- HR: --  Sitting BP: 150/109 HR: 77  Standing BP: 144/73 HR: 85  Site: --  Mode: --  Orthostatic VS  06-21-22 @ 08:08  Lying BP: --/-- HR: --  Sitting BP: 117/74 HR: 83  Standing BP: --/-- HR: --  Site: --  Mode: --

## 2022-06-22 NOTE — BH INPATIENT PSYCHIATRY PROGRESS NOTE - NSBHMETABOLIC_PSY_ALL_CORE_FT
BMI: BMI (kg/m2): 19.4 (06-18-22 @ 09:59)  HbA1c: A1C with Estimated Average Glucose Result: 5.9 % (06-19-22 @ 08:45)    Glucose:   BP: 140/58 (06-19-22 @ 21:04) (140/58 - 140/58)  Lipid Panel: Date/Time: 06-19-22 @ 08:45  Cholesterol, Serum: 165  Direct LDL: --  HDL Cholesterol, Serum: 64  Total Cholesterol/HDL Ration Measurement: --  Triglycerides, Serum: 58   BMI: BMI (kg/m2): 19.4 (06-18-22 @ 09:59)  HbA1c: A1C with Estimated Average Glucose Result: 5.9 % (06-19-22 @ 08:45)    Glucose:   BP: --  Lipid Panel: Date/Time: 06-19-22 @ 08:45  Cholesterol, Serum: 165  Direct LDL: --  HDL Cholesterol, Serum: 64  Total Cholesterol/HDL Ration Measurement: --  Triglycerides, Serum: 58
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
## 2022-06-22 NOTE — BH TREATMENT PLAN - NSDCCRITERIA_PSY_ALL_CORE
improvement in symptoms  75% reduction in psychosis with recovery of basic social and occupational functioning

## 2022-06-23 DIAGNOSIS — Z91.19 PATIENT'S NONCOMPLIANCE WITH OTHER MEDICAL TREATMENT AND REGIMEN: ICD-10-CM

## 2022-06-23 DIAGNOSIS — Z91.89 OTHER SPECIFIED PERSONAL RISK FACTORS, NOT ELSEWHERE CLASSIFIED: ICD-10-CM

## 2022-06-23 PROCEDURE — 99232 SBSQ HOSP IP/OBS MODERATE 35: CPT

## 2022-06-23 NOTE — BH INPATIENT PSYCHIATRY PROGRESS NOTE - NSBHMETABOLIC_PSY_ALL_CORE_FT
BMI: BMI (kg/m2): 19.4 (06-18-22 @ 09:59)  HbA1c: A1C with Estimated Average Glucose Result: 5.9 % (06-19-22 @ 08:45)    Glucose:   BP: --  Lipid Panel: Date/Time: 06-19-22 @ 08:45  Cholesterol, Serum: 165  Direct LDL: --  HDL Cholesterol, Serum: 64  Total Cholesterol/HDL Ration Measurement: --  Triglycerides, Serum: 58

## 2022-06-23 NOTE — BH INPATIENT PSYCHIATRY PROGRESS NOTE - NSBHCHARTREVIEWVS_PSY_A_CORE FT
Vital Signs Last 24 Hrs  T(C): 35.8 (06-23-22 @ 07:50), Max: 37.6 (06-22-22 @ 15:38)  T(F): 96.4 (06-23-22 @ 07:50), Max: 99.6 (06-22-22 @ 15:38)  HR: --  BP: --  BP(mean): --  RR: --  SpO2: --    Orthostatic VS  06-23-22 @ 07:50  Lying BP: --/-- HR: --  Sitting BP: 119/67 HR: 68  Standing BP: --/-- HR: --  Site: --  Mode: --  Orthostatic VS  06-22-22 @ 20:30  Lying BP: --/-- HR: --  Sitting BP: 137/97 HR: 72  Standing BP: --/-- HR: --  Site: --  Mode: --  Orthostatic VS  06-22-22 @ 08:16  Lying BP: --/-- HR: --  Sitting BP: 136/82 HR: 79  Standing BP: 122/83 HR: 89  Site: --  Mode: --  Orthostatic VS  06-21-22 @ 20:36  Lying BP: --/-- HR: --  Sitting BP: 150/109 HR: 77  Standing BP: 144/73 HR: 85  Site: --  Mode: --   Vital Signs Last 24 Hrs  T(C): 37.6 (06-23-22 @ 15:26), Max: 37.6 (06-23-22 @ 15:26)  T(F): 99.7 (06-23-22 @ 15:26), Max: 99.7 (06-23-22 @ 15:26)  HR: --  BP: --  BP(mean): --  RR: --  SpO2: --    Orthostatic VS  06-23-22 @ 07:50  Lying BP: --/-- HR: --  Sitting BP: 119/67 HR: 68  Standing BP: --/-- HR: --  Site: --  Mode: --  Orthostatic VS  06-22-22 @ 20:30  Lying BP: --/-- HR: --  Sitting BP: 137/97 HR: 72  Standing BP: --/-- HR: --  Site: --  Mode: --  Orthostatic VS  06-22-22 @ 08:16  Lying BP: --/-- HR: --  Sitting BP: 136/82 HR: 79  Standing BP: 122/83 HR: 89  Site: --  Mode: --  Orthostatic VS  06-21-22 @ 20:36  Lying BP: --/-- HR: --  Sitting BP: 150/109 HR: 77  Standing BP: 144/73 HR: 85  Site: --  Mode: --
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
## 2022-06-23 NOTE — BH INPATIENT PSYCHIATRY PROGRESS NOTE - MSE UNSTRUCTURED FT
Appearance: Thin Habitus, African american female with short grey hair who is poorly groomed with poor hygiene (significantly foul smelling). Patient presents with severe tardive dyskinesia (chronic) involving upper arms (worsened when walking), toungue/lips, missing teeth.   Behavior: Visible but does not socialize with anyone, talks to self. When approached, perseverates over delusions. Not showering or brushing teeth. Refusing all care and medications.   Speech: Patient is difficult to understand due to TD involving lips/toungue/missing teeth. Presents with pressured speech, difficult to interrupt. Hyperverbal when talked to, otherwise no spontaneous speech  when in social environment     Thought process: Illogical, disorganized in thought process and difficult to follow. perseverates about landlord harassing her.   Thought content: denies SIIP or HIIP. Delusions about landlord harassing her (severe in intensity/frequency - does not discuss any other topics with staff and perseverated over this delusion). Patient is responding to internal stimuli but denies AVH.   Mood: "I'm fine"  Affect: Labile, Range was Flat. Congruent with mood.  Insight: Limited at baseline   Judgement: Poor  Impulse control: Poor
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
## 2022-06-24 PROCEDURE — 99232 SBSQ HOSP IP/OBS MODERATE 35: CPT

## 2022-06-24 RX ORDER — OLANZAPINE 15 MG/1
5 TABLET, FILM COATED ORAL EVERY 8 HOURS
Refills: 0 | Status: DISCONTINUED | OUTPATIENT
Start: 2022-06-24 | End: 2022-06-29

## 2022-06-24 RX ORDER — OLANZAPINE 15 MG/1
5 TABLET, FILM COATED ORAL EVERY 6 HOURS
Refills: 0 | Status: DISCONTINUED | OUTPATIENT
Start: 2022-06-24 | End: 2022-06-29

## 2022-06-24 RX ORDER — CHLORHEXIDINE GLUCONATE 213 G/1000ML
15 SOLUTION TOPICAL
Refills: 0 | Status: DISCONTINUED | OUTPATIENT
Start: 2022-06-24 | End: 2022-09-19

## 2022-06-24 NOTE — BH INPATIENT PSYCHIATRY PROGRESS NOTE - MSE UNSTRUCTURED FT
Appearance: basic grooming intact; she is still malodorous; gait very unsteady due to TD  Behavior: dominating conversation, intense and oddly related  Speech: loud, very pressured, nearly impossible to interrupt  Mood: unable to ascertain  Affect: irritable, intense, labile  Thought process: tangential, disorganized, perseverative  Thought content: paranoid delusions remain, no spontaneous reports of SI but she is forward thinking  Perceptual disturbances: no appearance of internal preoccupation  Orientation: unable to ascertain due to poor cooperation  Abstraction: concrete  Fund of knowledge: limited  Insight: poor  Judgement: poor

## 2022-06-24 NOTE — BH INPATIENT PSYCHIATRY PROGRESS NOTE - NSBHCHARTREVIEWVS_PSY_A_CORE FT
Vital Signs Last 24 Hrs  T(C): 36.5 (06-24-22 @ 16:03), Max: 36.5 (06-24-22 @ 16:03)  T(F): 97.7 (06-24-22 @ 16:03), Max: 97.7 (06-24-22 @ 16:03)  HR: --  BP: --  BP(mean): --  RR: --  SpO2: --    Orthostatic VS  06-24-22 @ 07:58  Lying BP: --/-- HR: --  Sitting BP: 115/73 HR: 66  Standing BP: --/-- HR: --  Site: --  Mode: --  Orthostatic VS  06-23-22 @ 20:40  Lying BP: --/-- HR: --  Sitting BP: 126/79 HR: 82  Standing BP: --/-- HR: --  Site: --  Mode: --  Orthostatic VS  06-23-22 @ 07:50  Lying BP: --/-- HR: --  Sitting BP: 119/67 HR: 68  Standing BP: --/-- HR: --  Site: --  Mode: --  Orthostatic VS  06-22-22 @ 20:30  Lying BP: --/-- HR: --  Sitting BP: 137/97 HR: 72  Standing BP: --/-- HR: --  Site: --  Mode: --

## 2022-06-24 NOTE — BH INPATIENT PSYCHIATRY PROGRESS NOTE - CURRENT MEDICATION
MEDICATIONS  (STANDING):  chlorhexidine 0.12% Liquid 15 milliLiter(s) Swish and Spit two times a day  OLANZapine 2.5 milliGRAM(s) Oral two times a day    MEDICATIONS  (PRN):  LORazepam     Tablet 1 milliGRAM(s) Oral every 8 hours PRN severe anxiety  OLANZapine 5 milliGRAM(s) Oral every 6 hours PRN severe agitation  OLANZapine Injectable 5 milliGRAM(s) IntraMuscular every 8 hours PRN severe agitation not responding to oral olanzapine  OLANZapine Injectable 1.25 milliGRAM(s) IntraMuscular Once PRN severe agitation
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
## 2022-06-24 NOTE — BH INPATIENT PSYCHIATRY PROGRESS NOTE - PRN MEDS
MEDICATIONS  (PRN):  LORazepam     Tablet 1 milliGRAM(s) Oral every 8 hours PRN severe anxiety  OLANZapine 5 milliGRAM(s) Oral every 6 hours PRN severe agitation  OLANZapine Injectable 5 milliGRAM(s) IntraMuscular every 8 hours PRN severe agitation not responding to oral olanzapine  OLANZapine Injectable 1.25 milliGRAM(s) IntraMuscular Once PRN severe agitation

## 2022-06-27 PROCEDURE — 99232 SBSQ HOSP IP/OBS MODERATE 35: CPT

## 2022-06-27 NOTE — BH INPATIENT PSYCHIATRY PROGRESS NOTE - NSBHASSESSSUMMFT_PSY_ALL_CORE
71 yo Black woman, living with daughter, long h/o chronic paranoid schizophrenia with multiple prior psychiatric hospitalizations and long standing hx of medication nonadherence, no hx of suicide attempts or suicidality BIB EMS activated by patient’s daughter due to hostile and verbally abusive behavior towards their landlord, including throwing landlord's tools out of the building due to paranoid delusions that landlord has been harassing her. She has severe TD rendering her a significant fall risk. Patient has been refusing all medications and denying need to address fall risk.    Plan:    1) Disposition:   - continue inpatient care due to gross thought disorder and paranoid delusions rendering her unable to safely function outside of hospital  - family concerned about her returning home due to risk for eviction as a result of her behavior  2) Legal status: converted to [facility]; TOO applied for  3) Psychotropic medications:  - psychosis: continue zyprexa 2.5mg po bid - she is refusing  - TD: declining ingrezza  4) Non-pharmacologic interventions:  - individual, group, milieu therapy as appropriate  5) Medical comorbidities:  - halitosis? started peridex rinses; refusing dental exam  6) Work up:  - none  7) Social issues: Patient unwilling to involve daughter in care, however, patient lacks capacity to make medical decisions at this time  8) Psychoeducation: Risks and benefits discussion attempted but patient is not amenable  71 yo Black woman, living with daughter, long h/o chronic paranoid schizophrenia with multiple prior psychiatric hospitalizations and long standing hx of medication nonadherence, no hx of suicide attempts or suicidality BIB EMS activated by patient’s daughter due to hostile and verbally abusive behavior towards their landlord, including throwing landlord's tools out of the building due to paranoid delusions that landlord has been harassing her. She has severe TD rendering her a significant fall risk. Patient has been refusing all medications and denying need to address fall risk.    Plan:    1) Disposition:   - continue inpatient care due to gross thought disorder and paranoid delusions rendering her unable to safely function outside of hospital  - family concerned about her returning home due to risk for eviction as a result of her behavior  2) Legal status: converted to [facility]; TOO applied for  3) Psychotropic medications:  - psychosis: continue zyprexa 2.5mg po bid - she is refusing  - TD: declining ingrezza  4) Non-pharmacologic interventions:  - individual, group, milieu therapy as appropriate  5) Medical comorbidities:  - halitosis? started peridex rinses; refusing dental exam  6) Work up:  - check iron, b12, folate, thiamine, Vit D levels given signs of weight loss and anemia  7) Social issues: Patient unwilling to involve daughter in care, however, patient lacks capacity to make medical decisions at this time  8) Psychoeducation: Risks and benefits discussion attempted but patient is not amenable

## 2022-06-27 NOTE — BH INPATIENT PSYCHIATRY PROGRESS NOTE - NSBHMETABOLIC_PSY_ALL_CORE_FT
BMI: BMI (kg/m2): 19.4 (06-18-22 @ 09:59)  HbA1c: A1C with Estimated Average Glucose Result: 5.9 % (06-19-22 @ 08:45)    Glucose:   BP: 129/84 (06-27-22 @ 08:16) (129/84 - 129/84)  Lipid Panel: Date/Time: 06-19-22 @ 08:45  Cholesterol, Serum: 165  Direct LDL: --  HDL Cholesterol, Serum: 64  Total Cholesterol/HDL Ration Measurement: --  Triglycerides, Serum: 58

## 2022-06-27 NOTE — BH INPATIENT PSYCHIATRY PROGRESS NOTE - NSBHCHARTREVIEWVS_PSY_A_CORE FT
Vital Signs Last 24 Hrs  T(C): 36.3 (06-27-22 @ 15:31), Max: 37.4 (06-26-22 @ 16:40)  T(F): 97.4 (06-27-22 @ 15:31), Max: 99.4 (06-26-22 @ 16:40)  HR: 72 (06-27-22 @ 08:16) (72 - 72)  BP: 129/84 (06-27-22 @ 08:16) (129/84 - 129/84)  BP(mean): --  RR: --  SpO2: --    Orthostatic VS  06-26-22 @ 20:51  Lying BP: --/-- HR: --  Sitting BP: 120/81 HR: 74  Standing BP: --/-- HR: --  Site: --  Mode: --  Orthostatic VS  06-26-22 @ 05:37  Lying BP: 121/71 HR: 66  Sitting BP: --/-- HR: --  Standing BP: --/-- HR: --  Site: --  Mode: --  Orthostatic VS  06-25-22 @ 21:31  Lying BP: --/-- HR: --  Sitting BP: 126/76 HR: 74  Standing BP: --/-- HR: --  Site: upper left arm  Mode: electronic   Vital Signs Last 24 Hrs  T(C): 36.3 (06-27-22 @ 15:31), Max: 36.3 (06-27-22 @ 08:16)  T(F): 97.4 (06-27-22 @ 15:31), Max: 97.4 (06-27-22 @ 15:31)  HR: 72 (06-27-22 @ 08:16) (72 - 72)  BP: 129/84 (06-27-22 @ 08:16) (129/84 - 129/84)  BP(mean): --  RR: --  SpO2: --    Orthostatic VS  06-26-22 @ 20:51  Lying BP: --/-- HR: --  Sitting BP: 120/81 HR: 74  Standing BP: --/-- HR: --  Site: --  Mode: --  Orthostatic VS  06-26-22 @ 05:37  Lying BP: 121/71 HR: 66  Sitting BP: --/-- HR: --  Standing BP: --/-- HR: --  Site: --  Mode: --  Orthostatic VS  06-25-22 @ 21:31  Lying BP: --/-- HR: --  Sitting BP: 126/76 HR: 74  Standing BP: --/-- HR: --  Site: upper left arm  Mode: electronic

## 2022-06-27 NOTE — BH INPATIENT PSYCHIATRY PROGRESS NOTE - MSE UNSTRUCTURED FT
Appearance: basic grooming intact; she is still malodorous; gait very unsteady due to TD  Behavior: dominating conversation, intense and oddly related  Speech: loud, very pressured, nearly impossible to interrupt  Mood: unable to ascertain  Affect: irritable, intense, labile  Thought process: tangential, disorganized, perseverative  Thought content: paranoid delusions remain, no spontaneous reports of SI but she is forward thinking  Perceptual disturbances: no appearance of internal preoccupation  Orientation: unable to ascertain due to poor cooperation  Abstraction: concrete  Fund of knowledge: limited  Insight: poor  Judgement: poor     Appearance: dressed in hospital gown, no distress; gait remains very unsteady due to TD  Behavior: oddly related, argumentative, psychomotor agitation noted  Speech: loud, pressured, poorly articulated due to TD and poor dentition  Mood: unable to ascertain  Affect: irritable, intense, labile  Thought process: tangential, disorganized, illogical  Thought content: paranoid delusions remain, no spontaneous reports of SI but she is forward thinking  Perceptual disturbances: no appearance of internal preoccupation  Orientation: unable to ascertain due to poor cooperation  Abstraction: concrete  Fund of knowledge: limited  Insight: poor  Judgement: poor

## 2022-06-27 NOTE — BH INPATIENT PSYCHIATRY PROGRESS NOTE - NSBHFUPINTERVALHXFT_PSY_A_CORE
Chart reviewed and case discussed with treatment team. Staff report patient continues to refuse medications. She has periods of increased psychomotor activity, loud speech and quite repetitive. She remains paranoid. Denies any hallucinations.    Chart reviewed and case discussed with treatment team. Staff report patient continues to refuse medications. She has periods of increased psychomotor activity, loud speech and quite repetitive. She remains paranoid. Denies any hallucinations. Patient remains a danger to self due to poor self care, paranoid thinking about anyone that disagrees with her.

## 2022-06-28 LAB
FOLATE SERPL-MCNC: 20 NG/ML — HIGH (ref 3.1–17.5)
IRON SATN MFR SERPL: 31 % — SIGNIFICANT CHANGE UP (ref 14–50)
IRON SATN MFR SERPL: 89 UG/DL — SIGNIFICANT CHANGE UP (ref 30–160)
TIBC SERPL-MCNC: 284 UG/DL — SIGNIFICANT CHANGE UP (ref 220–430)
UIBC SERPL-MCNC: 195 UG/DL — SIGNIFICANT CHANGE UP (ref 110–370)
VIT B12 SERPL-MCNC: 511 PG/ML — SIGNIFICANT CHANGE UP (ref 200–900)

## 2022-06-28 PROCEDURE — 99232 SBSQ HOSP IP/OBS MODERATE 35: CPT | Mod: GC

## 2022-06-28 NOTE — BH INPATIENT PSYCHIATRY PROGRESS NOTE - MSE UNSTRUCTURED FT
Appearance: dressed in hospital gown, no distress; gait remains very unsteady due to TD  Behavior: oddly related, argumentative, psychomotor agitation noted  Speech: loud, pressured, poorly articulated due to TD and poor dentition  Mood: unable to ascertain  Affect: irritable, intense, labile  Thought process: tangential, disorganized, illogical  Thought content: paranoid delusions remain, no spontaneous reports of SI but she is forward thinking  Perceptual disturbances: no appearance of internal preoccupation  Orientation: unable to ascertain due to poor cooperation  Abstraction: concrete  Fund of knowledge: limited  Insight: poor  Judgement: poor

## 2022-06-28 NOTE — BH INPATIENT PSYCHIATRY PROGRESS NOTE - NSBHASSESSSUMMFT_PSY_ALL_CORE
72 year old  woman, domiciled with daughter, no dependents, hx of chronic paranoid schizophrenia with multiple prior psychiatric hospitalizations and long standing hx of medication nonadherence, no hx of suicide attempts or suicidality BIB EMS activated by patient’s daughter when pt was found throwing landlord's tools around home and verbally aggressive in the context of delusions that landlord was harassing her.     ASSESSMENT  1) Disorganization and illogical thought process: No improvement, patient refuses all medications. Team has applied for treatment over objection for Zyprexa, with aim to give RAYO if possible. Due to severe Tardive Dyskenesia, patient would benefit from Clozapine but poor candidate as non adherence to pills and lab work. Team will consider AOT.   2) Poor Hygiene/ADLs: patient did change into fresh clothes, refusing staff help with hygiene. Started on Mouthwash as refusing to brush teeth, unclear if she has halitosis vs poor hygiene  3) Delusions: Continues to perseverate and only discuss regarding landlord harassing her (untrue as per collateral), will attempt to reduce agitation and severity at this time. As per collateral, patient has chronic delusions that do not resolve completely.   4) Poor medical/dental care: will continue to encourage. As per daughter patient needs investigation on a new limp (?hip xray), and bleeding gums (Dental consult) but patient refusing and non cooperative at this time.     PLAN  - Continue Zyprexa 2.5mg BID (patient refusing)   - Consider Ingrezza for tardive dyskinesia in future (pt refusing all medications)  - Applied for treatment over objection, RAYO at discharge  - When patient agreeable, needs medication workup for limp and dental consult for poor oral care  - Disposition - daughter's home  - Discharge - TBD

## 2022-06-28 NOTE — BH INPATIENT PSYCHIATRY PROGRESS NOTE - NSBHATTESTATTENDBILLTIME2_PSY_A_CORE
I have reviewed and verified the documentation.

## 2022-06-28 NOTE — BH INPATIENT PSYCHIATRY PROGRESS NOTE - NSBHFUPINTERVALHXFT_PSY_A_CORE
Case discussed with MDT team, no acute events overnight. Patient denies SIIP/HIIP, she has been eating her meals and was noted to be dressed in home clithing, but perseverating on issues such as no shoes. As per staff, possibly is showering on her own but declines any help from others. Continues to feel she doesn't need medications. Court held today for TOO. Patient was explained such, continues to be disorganized and unable to focus on such. Patient has been refusing vitals or any help from staff regarding walking/hygiene as well.  Case discussed with MDT team, no acute events overnight. Patient denies SIIP/HIIP, she has been eating her meals and was noted to be dressed in home clothing, but perseverating on issues such as no shoes. As per staff, possibly is showering on her own but declines any help from others. Continues to feel she doesn't need medications. Court held today for TOO. Patient was explained such, continues to be disorganized and unable to focus on such. Patient has been refusing vitals or any help from staff regarding walking/hygiene as well.

## 2022-06-28 NOTE — BH INPATIENT PSYCHIATRY PROGRESS NOTE - NSBHATTESTCOMMENTATTENDFT_PSY_A_CORE
Application for treatment over objection to be filed tomorrow. All paperwork is complete. Will continue to monitor symptoms until approved. 
Patient remains grossly disorganized, paranoid and demonstrates poor self care. Continue inpatient care while waiting for treatment over objection hearing. 
TOO granted by courts today. Will explain to patient and administer IM zyprexa if patient refuses PO zyprexa.

## 2022-06-28 NOTE — BH INPATIENT PSYCHIATRY PROGRESS NOTE - NSBHATTESTATTENDBILLTIME_PSY_A_CORE
I independently performed the documented

## 2022-06-28 NOTE — BH INPATIENT PSYCHIATRY PROGRESS NOTE - NSBHATTESTATTENDPERFORM_PSY_A_CORE
History/Exam/Medical Decision Making

## 2022-06-28 NOTE — BH INPATIENT PSYCHIATRY PROGRESS NOTE - NSBHCHARTREVIEWVS_PSY_A_CORE FT
Vital Signs Last 24 Hrs  T(C): 36.4 (06-28-22 @ 08:19), Max: 36.4 (06-28-22 @ 08:19)  T(F): 97.6 (06-28-22 @ 08:19), Max: 97.6 (06-28-22 @ 08:19)  HR: --  BP: --  BP(mean): --  RR: --  SpO2: --    Orthostatic VS  06-28-22 @ 08:19  Lying BP: --/-- HR: --  Sitting BP: 127/75 HR: 82  Standing BP: 137/81 HR: 83  Site: --  Mode: --  Orthostatic VS  06-26-22 @ 20:51  Lying BP: --/-- HR: --  Sitting BP: 120/81 HR: 74  Standing BP: --/-- HR: --  Site: --  Mode: --   Vital Signs Last 24 Hrs  T(C): 36.2 (06-28-22 @ 15:39), Max: 36.4 (06-28-22 @ 08:19)  T(F): 97.2 (06-28-22 @ 15:39), Max: 97.6 (06-28-22 @ 08:19)  HR: --  BP: --  BP(mean): --  RR: --  SpO2: --    Orthostatic VS  06-28-22 @ 08:19  Lying BP: --/-- HR: --  Sitting BP: 127/75 HR: 82  Standing BP: 137/81 HR: 83  Site: --  Mode: --  Orthostatic VS  06-26-22 @ 20:51  Lying BP: --/-- HR: --  Sitting BP: 120/81 HR: 74  Standing BP: --/-- HR: --  Site: --  Mode: --

## 2022-06-29 PROCEDURE — 99232 SBSQ HOSP IP/OBS MODERATE 35: CPT

## 2022-06-29 RX ORDER — OLANZAPINE 15 MG/1
5 TABLET, FILM COATED ORAL DAILY
Refills: 0 | Status: DISCONTINUED | OUTPATIENT
Start: 2022-06-29 | End: 2022-06-29

## 2022-06-29 RX ORDER — ARIPIPRAZOLE 15 MG/1
5 TABLET ORAL AT BEDTIME
Refills: 0 | Status: DISCONTINUED | OUTPATIENT
Start: 2022-06-30 | End: 2022-06-30

## 2022-06-29 RX ORDER — OLANZAPINE 15 MG/1
2.5 TABLET, FILM COATED ORAL DAILY
Refills: 0 | Status: DISCONTINUED | OUTPATIENT
Start: 2022-06-29 | End: 2022-06-29

## 2022-06-29 RX ORDER — OLANZAPINE 15 MG/1
5 TABLET, FILM COATED ORAL AT BEDTIME
Refills: 0 | Status: DISCONTINUED | OUTPATIENT
Start: 2022-06-29 | End: 2022-06-29

## 2022-06-29 RX ORDER — OLANZAPINE 15 MG/1
5 TABLET, FILM COATED ORAL AT BEDTIME
Refills: 0 | Status: DISCONTINUED | OUTPATIENT
Start: 2022-06-30 | End: 2022-07-13

## 2022-06-29 RX ORDER — ARIPIPRAZOLE 15 MG/1
5 TABLET ORAL DAILY
Refills: 0 | Status: DISCONTINUED | OUTPATIENT
Start: 2022-06-29 | End: 2022-06-29

## 2022-06-29 RX ADMIN — ARIPIPRAZOLE 5 MILLIGRAM(S): 15 TABLET ORAL at 09:57

## 2022-06-29 NOTE — BH INPATIENT PSYCHIATRY PROGRESS NOTE - NSBHASSESSSUMMFT_PSY_ALL_CORE
72 year old  woman, domiciled with daughter, no dependents, hx of chronic paranoid schizophrenia with multiple prior psychiatric hospitalizations and long standing hx of medication nonadherence, no hx of suicide attempts or suicidality BIB EMS activated by patient’s daughter when pt was found throwing landlord's tools around home and verbally aggressive in the context of delusions that landlord was harassing her.     ASSESSMENT  1) Disorganization and illogical thought process: Treatment over objection obtained due to patient refusal for medications. Due to severe Tardive Dyskenesia, patient would benefit from Clozapine but poor candidate as non adherence to pills and lab work. Patient likely will also need AOT placement.   2) Poor Hygiene/ADLs: patient did change into fresh clothes, refusing staff help with hygiene. Started on Mouthwash as refusing to brush teeth, unclear if she has halitosis vs poor hygiene  3) Delusions: Continues to perseverate and only discuss regarding landlord harassing her (untrue as per collateral), will attempt to reduce agitation and severity at this time. As per collateral, patient has chronic delusions that do not resolve completely.   4) Poor medical/dental care: will continue to encourage. As per daughter patient needs investigation on a new limp (?hip xray), and bleeding gums (Dental consult) but patient refusing and non cooperative at this time.     PLAN  - Start Abilify 5mg qhs (Zyprexa 5mg IM) if patient refuses.   - AOT application, RAYO at discharge  - Consider Ingrezza for tardive dyskinesia in future (pt refusing all medications)  - treatment over objection obtained  - When patient agreeable, needs medication workup for limp and dental consult for poor oral care  - Disposition - daughter's home  - Discharge - TBD

## 2022-06-29 NOTE — BH TREATMENT PLAN - NSTXCAREGIVERPARTICIPATE_PSY_P_CORE
Family/Caregiver participated in identification of needs/problems/goals for treatment/No, patient unwilling to involve family/caregiver

## 2022-06-29 NOTE — BH INPATIENT PSYCHIATRY PROGRESS NOTE - NSBHFUPINTERVALHXFT_PSY_A_CORE
Case discussed with MDT team, no acute events overnight. Patient denies SIIP/HIIP, she has been eating her meals. Multiple staff memebers reporting patient is still not bathing and foul smelling. patient was upset today due to Court order, was refusing to speak to team and ran away when approached. She was seen by RN and explained she didn't need medications. Took Abilify PO however and was agreeable.

## 2022-06-29 NOTE — BH INPATIENT PSYCHIATRY PROGRESS NOTE - CURRENT MEDICATION
MEDICATIONS  (STANDING):  chlorhexidine 0.12% Liquid 15 milliLiter(s) Swish and Spit two times a day    MEDICATIONS  (PRN):  LORazepam     Tablet 1 milliGRAM(s) Oral every 8 hours PRN severe anxiety  OLANZapine 5 milliGRAM(s) Oral every 6 hours PRN severe agitation   MEDICATIONS  (STANDING):  chlorhexidine 0.12% Liquid 15 milliLiter(s) Swish and Spit two times a day    MEDICATIONS  (PRN):  LORazepam     Tablet 1 milliGRAM(s) Oral every 8 hours PRN severe anxiety

## 2022-06-29 NOTE — BH INPATIENT PSYCHIATRY PROGRESS NOTE - MSE UNSTRUCTURED FT
Appearance: dressed in hospital gown, no distress; gait remains very unsteady due to TD  Behavior: running away from staff, refusing to speak  Speech: loud, pressured, poorly articulated due to TD and poor dentition  Mood: unable to ascertain  Affect: irritable, intense, labile  Thought process: tangential, disorganized, illogical  Thought content: paranoid delusions remain, no spontaneous reports of SI but she is forward thinking  Perceptual disturbances: no appearance of internal preoccupation  Orientation: unable to ascertain due to poor cooperation  Abstraction: concrete  Fund of knowledge: limited  Insight: poor  Judgement: poor

## 2022-06-29 NOTE — BH INPATIENT PSYCHIATRY PROGRESS NOTE - NSBHCHARTREVIEWVS_PSY_A_CORE FT
Vital Signs Last 24 Hrs  T(C): 37.1 (06-29-22 @ 08:21), Max: 37.1 (06-29-22 @ 08:21)  T(F): 98.8 (06-29-22 @ 08:21), Max: 98.8 (06-29-22 @ 08:21)  HR: --  BP: --  BP(mean): --  RR: 17 (06-29-22 @ 08:21) (17 - 17)  SpO2: --    Orthostatic VS  06-29-22 @ 08:21  Lying BP: --/-- HR: --  Sitting BP: 111/78 HR: 70  Standing BP: 121/78 HR: 81  Site: --  Mode: --  Orthostatic VS  06-28-22 @ 08:19  Lying BP: --/-- HR: --  Sitting BP: 127/75 HR: 82  Standing BP: 137/81 HR: 83  Site: --  Mode: --   Vital Signs Last 24 Hrs  T(C): 37.6 (06-29-22 @ 15:29), Max: 37.6 (06-29-22 @ 15:29)  T(F): 99.7 (06-29-22 @ 15:29), Max: 99.7 (06-29-22 @ 15:29)  HR: --  BP: --  BP(mean): --  RR: 17 (06-29-22 @ 08:21) (17 - 17)  SpO2: --    Orthostatic VS  06-29-22 @ 08:21  Lying BP: --/-- HR: --  Sitting BP: 111/78 HR: 70  Standing BP: 121/78 HR: 81  Site: --  Mode: --  Orthostatic VS  06-28-22 @ 08:19  Lying BP: --/-- HR: --  Sitting BP: 127/75 HR: 82  Standing BP: 137/81 HR: 83  Site: --  Mode: --

## 2022-06-29 NOTE — BH INPATIENT PSYCHIATRY PROGRESS NOTE - PRN MEDS
MEDICATIONS  (PRN):  LORazepam     Tablet 1 milliGRAM(s) Oral every 8 hours PRN severe anxiety  OLANZapine 5 milliGRAM(s) Oral every 6 hours PRN severe agitation   MEDICATIONS  (PRN):  LORazepam     Tablet 1 milliGRAM(s) Oral every 8 hours PRN severe anxiety

## 2022-06-29 NOTE — BH TREATMENT PLAN - NSTXOTHERINTERSW_PSY_ALL_CORE
sw mets with patient who remains psychotic.  MD has reportedly documented pt does not have capacity and sw should call daughter.

## 2022-06-30 PROCEDURE — 99232 SBSQ HOSP IP/OBS MODERATE 35: CPT

## 2022-06-30 RX ORDER — ARIPIPRAZOLE 15 MG/1
5 TABLET ORAL DAILY
Refills: 0 | Status: DISCONTINUED | OUTPATIENT
Start: 2022-06-30 | End: 2022-07-01

## 2022-06-30 RX ADMIN — CHLORHEXIDINE GLUCONATE 15 MILLILITER(S): 213 SOLUTION TOPICAL at 09:00

## 2022-06-30 RX ADMIN — ARIPIPRAZOLE 5 MILLIGRAM(S): 15 TABLET ORAL at 14:28

## 2022-06-30 NOTE — BH INPATIENT PSYCHIATRY PROGRESS NOTE - MSE UNSTRUCTURED FT
Appearance: dressed in hospital gown, no distress; gait remains very unsteady due to TD  Behavior: running away from staff, refusing to speak  Speech: loud, pressured, poorly articulated due to TD and poor dentition  Mood: unable to ascertain  Affect: irritable, intense, labile  Thought process: tangential, disorganized, illogical  Thought content: paranoid delusions remain, no spontaneous reports of SI but she is forward thinking  Perceptual disturbances: no appearance of internal preoccupation  Orientation: unable to ascertain due to poor cooperation  Abstraction: concrete  Fund of knowledge: limited  Insight: poor  Judgement: poor     Appearance: adequate grooming and hygiene; severe halitosis noted; gait limited by TD  Behavior: calm, cooperative, no psychomotor agitation or retardation  Speech: loud, pressured, difficult to interrupt, poorly articulated due to TD and poor dentition  Mood: denies complaints  Affect: neutral, stable, reactive  Thought process: tangential, disorganized, less illogical  Thought content: paranoid delusions remain, denies SI  Perceptual disturbances: no appearance of internal preoccupation  Orientation: unable to ascertain due to poor cooperation  Abstraction: concrete  Fund of knowledge: limited  Insight: poor  Judgement: poor

## 2022-06-30 NOTE — BH INPATIENT PSYCHIATRY PROGRESS NOTE - NSBHCHARTREVIEWVS_PSY_A_CORE FT
Vital Signs Last 24 Hrs  T(C): 36.4 (06-30-22 @ 15:35), Max: 36.5 (06-30-22 @ 07:56)  T(F): 97.5 (06-30-22 @ 15:35), Max: 97.7 (06-30-22 @ 07:56)  HR: --  BP: --  BP(mean): --  RR: --  SpO2: --    Orthostatic VS  06-30-22 @ 07:56  Lying BP: --/-- HR: --  Sitting BP: 125/79 HR: 68  Standing BP: --/-- HR: --  Site: --  Mode: --  Orthostatic VS  06-29-22 @ 08:21  Lying BP: --/-- HR: --  Sitting BP: 111/78 HR: 70  Standing BP: 121/78 HR: 81  Site: --  Mode: --

## 2022-06-30 NOTE — BH INPATIENT PSYCHIATRY PROGRESS NOTE - NSBHASSESSSUMMFT_PSY_ALL_CORE
72 year old  woman, domiciled with daughter though can no longer return, hx of chronic paranoid schizophrenia with multiple prior psychiatric hospitalizations and long standing hx of medication nonadherence, no hx of suicide attempts or suicidality BIB EMS activated by patient’s daughter when pt was found throwing landlord's tools around home and verbally aggressive in the context of delusions that landlord was harassing her. TOO obtained    Plan:    1) Disposition:   - continue inpatient care due to ongoing psychosis severely impairing function   - unclear where patient will return to at this time  2) Legal status: 9.27 - patient submitted a writ for discharge; treatment team recommends ongoing inpatient treatment due to patient's symptoms rendering her a danger to self due to poor self care and poor engagement in safety measures for her unstable gait  3) Psychotropic medications:  - continue abilify liquid 5mg po daily - will titrate to effect and monitor TD  - will try to acquire ingrezza for patient's TD  - on TOO - zyprexa 5mg IM if patient refuses PO abilify  - plan for RAYO  4) Non-pharmacologic interventions:  - individual, group, milieu therapy as appropriate  5) Medical comorbidities:  - no acute needs other than gait instability  6) Work up:  - none  7) Social issues: patient now allowing care coordination with family  8) Psychoeducation: Risks and benefits discussed with patient - she has limited understanding and insight

## 2022-06-30 NOTE — BH INPATIENT PSYCHIATRY PROGRESS NOTE - NSBHFUPINTERVALHXFT_PSY_A_CORE
Chart reviewed and case discussed with treatment team. Staff report    Chart reviewed and case discussed with treatment team. Staff report patient has been cooperative with care. She appears to be attending to basic ADLs. She remains paranoid about her landlord. Though her children have informed her and the team she cannot return to live with them, patient is in denial of this fact. She has been taking PO abilify as prescribed without requiring IM medications as allowed by her court ordered TOO.

## 2022-06-30 NOTE — BH INPATIENT PSYCHIATRY PROGRESS NOTE - PRN MEDS
MEDICATIONS  (PRN):  LORazepam     Tablet 1 milliGRAM(s) Oral every 8 hours PRN severe anxiety  OLANZapine Injectable 5 milliGRAM(s) IntraMuscular at bedtime PRN refusing abilify PO

## 2022-06-30 NOTE — BH INPATIENT PSYCHIATRY PROGRESS NOTE - CURRENT MEDICATION
MEDICATIONS  (STANDING):  ARIPiprazole  Solution 5 milliGRAM(s) Oral daily  chlorhexidine 0.12% Liquid 15 milliLiter(s) Swish and Spit two times a day    MEDICATIONS  (PRN):  LORazepam     Tablet 1 milliGRAM(s) Oral every 8 hours PRN severe anxiety  OLANZapine Injectable 5 milliGRAM(s) IntraMuscular at bedtime PRN refusing abilify PO

## 2022-07-01 PROCEDURE — 99232 SBSQ HOSP IP/OBS MODERATE 35: CPT

## 2022-07-01 RX ORDER — ARIPIPRAZOLE 15 MG/1
10 TABLET ORAL DAILY
Refills: 0 | Status: DISCONTINUED | OUTPATIENT
Start: 2022-07-01 | End: 2022-07-05

## 2022-07-01 RX ADMIN — ARIPIPRAZOLE 5 MILLIGRAM(S): 15 TABLET ORAL at 08:49

## 2022-07-01 RX ADMIN — CHLORHEXIDINE GLUCONATE 15 MILLILITER(S): 213 SOLUTION TOPICAL at 08:51

## 2022-07-01 NOTE — BH INPATIENT PSYCHIATRY PROGRESS NOTE - NSBHFUPINTERVALHXFT_PSY_A_CORE
Chart reviewed and case discussed with treatment team. Staff report    Chart reviewed and case discussed with treatment team. Staff report patient has been compliant with medications however, she maintains she does not need treatment and is requesting discharge. Patient continues to be paranoid about her landlord. When writer tries to redirect her thoughts and provide psychoeducation regarding her diagnosis, she becomes irritated and slightly more paranoid towards this writer.

## 2022-07-01 NOTE — BH INPATIENT PSYCHIATRY PROGRESS NOTE - NSBHCHARTREVIEWVS_PSY_A_CORE FT
Vital Signs Last 24 Hrs  T(C): 36.3 (07-01-22 @ 15:47), Max: 37.1 (07-01-22 @ 07:12)  T(F): 97.3 (07-01-22 @ 15:47), Max: 98.7 (07-01-22 @ 07:12)  HR: --  BP: --  BP(mean): --  RR: --  SpO2: --    Orthostatic VS  07-01-22 @ 07:12  Lying BP: --/-- HR: --  Sitting BP: 119/63 HR: 76  Standing BP: 104/68 HR: 76  Site: --  Mode: --  Orthostatic VS  06-30-22 @ 07:56  Lying BP: --/-- HR: --  Sitting BP: 125/79 HR: 68  Standing BP: --/-- HR: --  Site: --  Mode: --

## 2022-07-01 NOTE — BH INPATIENT PSYCHIATRY PROGRESS NOTE - NSBHASSESSSUMMFT_PSY_ALL_CORE
72 year old  woman, domiciled with daughter though can no longer return, hx of chronic paranoid schizophrenia with multiple prior psychiatric hospitalizations and long standing hx of medication nonadherence, no hx of suicide attempts or suicidality BIB EMS activated by patient’s daughter when pt was found throwing landlord's tools around home and verbally aggressive in the context of delusions that landlord was harassing her. TOO obtained    Plan:    1) Disposition:   - continue inpatient care due to ongoing psychosis severely impairing function   - unclear where patient will return to at this time  2) Legal status: 9.27 - patient submitted a writ for discharge; treatment team recommends ongoing inpatient treatment due to patient's symptoms rendering her a danger to self due to poor self care and poor engagement in safety measures for her unstable gait  3) Psychotropic medications:  - increase abilify liquid to 10mg po daily - will titrate to effect and monitor TD  - will try to acquire ingrezza for patient's TD  - on TOO - zyprexa 5mg IM if patient refuses PO abilify  - plan for RAYO  4) Non-pharmacologic interventions:  - individual, group, milieu therapy as appropriate  5) Medical comorbidities:  - no acute needs other than gait instability  6) Work up:  - none  7) Social issues: patient now allowing care coordination with family  8) Psychoeducation: Risks and benefits discussed with patient - she has limited understanding and insight

## 2022-07-01 NOTE — BH INPATIENT PSYCHIATRY PROGRESS NOTE - MSE UNSTRUCTURED FT
Appearance: adequate grooming and hygiene; severe halitosis noted; gait limited by TD  Behavior: calm, cooperative, no psychomotor agitation or retardation  Speech: loud, pressured, difficult to interrupt, poorly articulated due to TD and poor dentition  Mood: denies complaints  Affect: neutral, stable, reactive  Thought process: tangential, disorganized, less illogical  Thought content: paranoid delusions remain, denies SI  Perceptual disturbances: no appearance of internal preoccupation  Orientation: unable to ascertain due to poor cooperation  Abstraction: concrete  Fund of knowledge: limited  Insight: poor  Judgement: poor     Appearance: fair grooming and hygiene; severe halitosis noted; gait limited by TD  Behavior: mostly cooperative, no psychomotor agitation or retardation  Speech: loud, pressured, difficult to interrupt, poorly articulated due to TD and poor dentition  Mood: denies complaints  Affect: neutral, stable, reactive initially but can get animated and irritable quickly  Thought process: tangential, disorganized, illogical  Thought content: paranoid delusions remain, denies SI  Perceptual disturbances: no appearance of internal preoccupation  Orientation: unable to ascertain due to poor cooperation  Abstraction: concrete  Fund of knowledge: limited  Insight: poor  Judgement: poor

## 2022-07-02 LAB — VIT B1 SERPL-MCNC: 135.5 NMOL/L — SIGNIFICANT CHANGE UP (ref 66.5–200)

## 2022-07-02 RX ADMIN — CHLORHEXIDINE GLUCONATE 15 MILLILITER(S): 213 SOLUTION TOPICAL at 09:00

## 2022-07-02 RX ADMIN — ARIPIPRAZOLE 10 MILLIGRAM(S): 15 TABLET ORAL at 08:55

## 2022-07-03 RX ADMIN — ARIPIPRAZOLE 10 MILLIGRAM(S): 15 TABLET ORAL at 09:03

## 2022-07-04 RX ADMIN — ARIPIPRAZOLE 10 MILLIGRAM(S): 15 TABLET ORAL at 09:33

## 2022-07-04 RX ADMIN — CHLORHEXIDINE GLUCONATE 15 MILLILITER(S): 213 SOLUTION TOPICAL at 09:33

## 2022-07-05 PROCEDURE — 99232 SBSQ HOSP IP/OBS MODERATE 35: CPT

## 2022-07-05 RX ORDER — ARIPIPRAZOLE 15 MG/1
15 TABLET ORAL DAILY
Refills: 0 | Status: DISCONTINUED | OUTPATIENT
Start: 2022-07-05 | End: 2022-07-07

## 2022-07-05 RX ADMIN — CHLORHEXIDINE GLUCONATE 15 MILLILITER(S): 213 SOLUTION TOPICAL at 09:24

## 2022-07-05 RX ADMIN — ARIPIPRAZOLE 10 MILLIGRAM(S): 15 TABLET ORAL at 09:24

## 2022-07-05 NOTE — BH INPATIENT PSYCHIATRY PROGRESS NOTE - NSBHFUPINTERVALHXFT_PSY_A_CORE
Chart reviewed and case discussed with treatment team. Staff report patient has had no overt agitation. However, she remains disorganized and illogical. She has required significant encouragement from staff for ADL care. Patient remains paranoid about her landlord. Her family have reported she cannot return to live with her daughter as her behavior has put them at risk of eviction. Patient declined to have a conversation about this and repeatedly stated she will return home, talking over this writer. She still declines allowing treatment team to discuss care with family or obtain records from outpatient providers. She has submitted a writ for discharge and will likely be in court next week. She verbalized understanding of this. Patient has been compliant with medications.

## 2022-07-05 NOTE — BH INPATIENT PSYCHIATRY PROGRESS NOTE - MSE UNSTRUCTURED FT
Appearance: intact grooming; external hygiene appears ok but she remains malodorous, possibly from halitosis; gait remains unsteady due to TD  Behavior: no psychomotor agitation or retardation though becomes very animated and dominates conversation when her landlord issues are mentioned  Speech: poorly articulated due to TD and poor dentition at baseline; becomes pressured and loud selectively when we discuss disposition and landlord problems  Mood: denies complaints  Affect: neutral, stable, reactive initially but escalates quickly to irritability at times  Thought process: tangential, disorganized, illogical  Thought content: paranoid delusions remain, denies SI  Perceptual disturbances: no appearance of internal preoccupation  Orientation: unable to ascertain due to poor cooperation  Abstraction: concrete  Fund of knowledge: limited  Insight: poor  Judgement: poor

## 2022-07-05 NOTE — BH INPATIENT PSYCHIATRY PROGRESS NOTE - NSBHMETABOLIC_PSY_ALL_CORE_FT
BMI: BMI (kg/m2): 19.7 (07-02-22 @ 16:45)  HbA1c: A1C with Estimated Average Glucose Result: 5.9 % (06-19-22 @ 08:45)    Glucose:   BP: --  Lipid Panel: Date/Time: 06-19-22 @ 08:45  Cholesterol, Serum: 165  Direct LDL: --  HDL Cholesterol, Serum: 64  Total Cholesterol/HDL Ration Measurement: --  Triglycerides, Serum: 58

## 2022-07-05 NOTE — BH INPATIENT PSYCHIATRY PROGRESS NOTE - NSBHCHARTREVIEWVS_PSY_A_CORE FT
Vital Signs Last 24 Hrs  T(C): 36.5 (07-05-22 @ 08:11), Max: 36.5 (07-05-22 @ 08:11)  T(F): 97.7 (07-05-22 @ 08:11), Max: 97.7 (07-05-22 @ 08:11)  HR: --  BP: --  BP(mean): --  RR: --  SpO2: --    Orthostatic VS  07-05-22 @ 08:11  Lying BP: --/-- HR: --  Sitting BP: 143/76 HR: 70  Standing BP: 127/81 HR: 78  Site: --  Mode: --  Orthostatic VS  07-04-22 @ 08:34  Lying BP: --/-- HR: --  Sitting BP: 127/79 HR: 66  Standing BP: 124/82 HR: 78  Site: --  Mode: --

## 2022-07-05 NOTE — BH INPATIENT PSYCHIATRY PROGRESS NOTE - NSBHASSESSSUMMFT_PSY_ALL_CORE
72 year old  woman, domiciled with daughter though can no longer return, hx of chronic paranoid schizophrenia with multiple prior psychiatric hospitalizations and long standing hx of medication nonadherence, no hx of suicide attempts or suicidality BIB EMS activated by patient’s daughter when pt was found throwing landlord's tools around home and verbally aggressive in the context of delusions that landlord was harassing her. TOO obtained    Plan:    1) Disposition:   - continue inpatient care due to ongoing psychosis severely impairing function   - unclear where patient will return to at this time  2) Legal status: 9.27 - patient submitted a writ for discharge; treatment team recommends ongoing inpatient treatment due to patient's symptoms rendering her a danger to self due to poor self care and poor engagement in safety measures for her unstable gait  3) Psychotropic medications:  - increase abilify liquid to 15mg po daily - will titrate to effect and monitor TD  - will try to acquire ingrezza for patient's TD  - on TOO - zyprexa 5mg IM if patient refuses PO abilify  - plan for RAYO  4) Non-pharmacologic interventions:  - individual, group, milieu therapy as appropriate  5) Medical comorbidities:  - no acute needs other than gait instability  6) Work up:  - none  7) Social issues: patient not allowing care coordination with family or to obtain records from other providers; consider referral to [facility]  8) Psychoeducation: Risks and benefits discussed with patient - she has limited understanding and insight

## 2022-07-06 PROCEDURE — 99232 SBSQ HOSP IP/OBS MODERATE 35: CPT

## 2022-07-06 RX ADMIN — ARIPIPRAZOLE 15 MILLIGRAM(S): 15 TABLET ORAL at 09:20

## 2022-07-06 RX ADMIN — CHLORHEXIDINE GLUCONATE 15 MILLILITER(S): 213 SOLUTION TOPICAL at 10:07

## 2022-07-06 NOTE — BH INPATIENT PSYCHIATRY PROGRESS NOTE - CURRENT MEDICATION
MEDICATIONS  (STANDING):  ARIPiprazole  Solution 15 milliGRAM(s) Oral daily  chlorhexidine 0.12% Liquid 15 milliLiter(s) Swish and Spit two times a day    MEDICATIONS  (PRN):  LORazepam     Tablet 1 milliGRAM(s) Oral every 8 hours PRN severe anxiety  OLANZapine Injectable 5 milliGRAM(s) IntraMuscular at bedtime PRN refusing abilify PO

## 2022-07-06 NOTE — BH INPATIENT PSYCHIATRY PROGRESS NOTE - NSBHFUPINTERVALHXFT_PSY_A_CORE
Chart reviewed and case discussed with treatment team. Staff report patient has been calm and med compliant. She remains paranoid, however. She maintains her landlord is a problem but she is resistant to having any realistic discussion about her returning to live there. She is focused on discharge saying she needs to pay her bills. Writer informed her we can help coordinate bill payments while she is here or to provide letters to allow more time pay them when she gets discharged. Patient is adamant that she'll have to pay them in person and proceeded to perseverate about this and further discussion about anything else was impossible due to her dominating the conversation.

## 2022-07-06 NOTE — BH INPATIENT PSYCHIATRY PROGRESS NOTE - NSBHASSESSSUMMFT_PSY_ALL_CORE
72 year old  woman, domiciled with daughter though can no longer return, hx of chronic paranoid schizophrenia with multiple prior psychiatric hospitalizations and long standing hx of medication nonadherence, no hx of suicide attempts or suicidality BIB EMS activated by patient’s daughter when pt was found throwing landlord's tools around home and verbally aggressive in the context of delusions that landlord was harassing her. TOO obtained. Patient remains paranoid, disorganized.     Plan:    1) Disposition:   - continue inpatient care due to ongoing psychosis severely impairing function   - unclear where patient will return to at this time  2) Legal status: 9.27 - patient submitted a writ for discharge; treatment team recommends ongoing inpatient treatment due to patient's symptoms rendering her a danger to self due to poor self care and poor engagement in safety measures for her unstable gait  3) Psychotropic medications:  - continue abilify liquid 15mg po daily - will titrate to effect and monitor TD  - will try to acquire ingrezza for patient's TD  - on TOO - zyprexa 5mg IM if patient refuses PO abilify  - plan for RAYO  4) Non-pharmacologic interventions:  - individual, group, milieu therapy as appropriate  5) Medical comorbidities:  - no acute needs other than gait instability  6) Work up:  - none  7) Social issues: patient not allowing care coordination with family or to obtain records from other providers; consider referral to [facility]  8) Psychoeducation: Risks and benefits discussed with patient - she has limited understanding and insight

## 2022-07-06 NOTE — BH INPATIENT PSYCHIATRY PROGRESS NOTE - NSBHCHARTREVIEWVS_PSY_A_CORE FT
Vital Signs Last 24 Hrs  T(C): 37.2 (07-06-22 @ 15:37), Max: 37.2 (07-06-22 @ 15:37)  T(F): 98.9 (07-06-22 @ 15:37), Max: 98.9 (07-06-22 @ 15:37)  HR: --  BP: --  BP(mean): --  RR: --  SpO2: --    Orthostatic VS  07-06-22 @ 08:38  Lying BP: --/-- HR: --  Sitting BP: 120/76 HR: 68  Standing BP: 131/78 HR: 75  Site: --  Mode: --  Orthostatic VS  07-05-22 @ 08:11  Lying BP: --/-- HR: --  Sitting BP: 143/76 HR: 70  Standing BP: 127/81 HR: 78  Site: --  Mode: --

## 2022-07-06 NOTE — BH INPATIENT PSYCHIATRY PROGRESS NOTE - MSE UNSTRUCTURED FT
Appearance: gait remains unsteady due to TD, no tremors noted; poor dentition; dressed in hospital attire  Behavior: mild psychomotor agitation when writer disagrees with her; oddly related  Speech: pressured, dominates conversation; poorly articulated due to TD and poor dentition at baseline;   Mood: denies complaints  Affect: irritable, constricted, stable  Thought process: tangential, disorganized, illogical  Thought content: paranoid delusions remain, denies SI  Perceptual disturbances: no appearance of internal preoccupation  Orientation: unable to ascertain due to poor cooperation  Abstraction: concrete  Fund of knowledge: limited  Insight: poor  Judgement: poor

## 2022-07-07 PROCEDURE — 99232 SBSQ HOSP IP/OBS MODERATE 35: CPT

## 2022-07-07 RX ORDER — ARIPIPRAZOLE 15 MG/1
20 TABLET ORAL DAILY
Refills: 0 | Status: DISCONTINUED | OUTPATIENT
Start: 2022-07-07 | End: 2022-07-12

## 2022-07-07 RX ADMIN — ARIPIPRAZOLE 15 MILLIGRAM(S): 15 TABLET ORAL at 09:56

## 2022-07-07 RX ADMIN — CHLORHEXIDINE GLUCONATE 15 MILLILITER(S): 213 SOLUTION TOPICAL at 09:59

## 2022-07-07 NOTE — BH INPATIENT PSYCHIATRY PROGRESS NOTE - NSBHFUPINTERVALHXFT_PSY_A_CORE
Chart reviewed and case discussed with treatment team. Staff report patient has been calm but remains paranoid. She continues to refuse allowing team to discuss care with her daughter and to obtain medical records from other hospitals. Patient claims it's "my personal business." Patient remains paranoid about her landlord. Conversation is difficult due to her perseverative thought process, particularly about the coming court hearing regarding her writ for discharge.

## 2022-07-07 NOTE — BH INPATIENT PSYCHIATRY PROGRESS NOTE - NSBHCHARTREVIEWVS_PSY_A_CORE FT
Vital Signs Last 24 Hrs  T(C): 36.2 (07-07-22 @ 15:38), Max: 36.7 (07-07-22 @ 08:36)  T(F): 97.2 (07-07-22 @ 15:38), Max: 98.1 (07-07-22 @ 08:36)  HR: --  BP: --  BP(mean): --  RR: --  SpO2: --    Orthostatic VS  07-07-22 @ 08:36  Lying BP: --/-- HR: --  Sitting BP: 127/69 HR: 61  Standing BP: 117/62 HR: 71  Site: upper left arm  Mode: electronic  Orthostatic VS  07-06-22 @ 08:38  Lying BP: --/-- HR: --  Sitting BP: 120/76 HR: 68  Standing BP: 131/78 HR: 75  Site: --  Mode: --

## 2022-07-07 NOTE — BH INPATIENT PSYCHIATRY PROGRESS NOTE - NSBHASSESSSUMMFT_PSY_ALL_CORE
72 year old  woman, domiciled with daughter though can no longer return, hx of chronic paranoid schizophrenia with multiple prior psychiatric hospitalizations and long standing hx of medication nonadherence, no hx of suicide attempts or suicidality BIB EMS activated by patient’s daughter when pt was found throwing landlord's tools around home and verbally aggressive in the context of delusions that landlord was harassing her. TOO obtained. Patient remains paranoid, disorganized.     Plan:    1) Disposition:   - continue inpatient care due to ongoing psychosis severely impairing function   - unclear where patient will return to at this time  2) Legal status: 9.27 - patient submitted a writ for discharge; treatment team recommends ongoing inpatient treatment due to patient's symptoms rendering her a danger to self due to poor self care and poor engagement in safety measures for her unstable gait  3) Psychotropic medications:  - increase abilify liquid to 20mg po daily - will titrate to effect and monitor TD  - will try to acquire ingrezza for patient's TD  - on TOO - zyprexa 5mg IM if patient refuses PO abilify  - plan for RAYO  4) Non-pharmacologic interventions:  - individual, group, milieu therapy as appropriate  5) Medical comorbidities:  - no acute needs other than gait instability  6) Work up:  - none  7) Social issues: patient not allowing care coordination with family or to obtain records from other providers but we will need to make contact with daughter to inquire if patient allowed to return (daughter is already aware she is here); consider referral to [facility]  8) Psychoeducation: Risks and benefits discussed with patient - she has limited understanding and insight

## 2022-07-07 NOTE — BH INPATIENT PSYCHIATRY PROGRESS NOTE - MSE UNSTRUCTURED FT
Appearance: gait remains unsteady due to TD, no tremors noted; dressed in hospital attire; no overt deficits in hygiene but halitosis remains  Behavior: still with mild psychomotor agitation particularly when her beliefs her challenged  Speech: very pressured, impossible to interrupt; dominates conversation; poorly articulated due to TD and poor dentition at baseline;   Mood: denies complaints  Affect: irritable, constricted, stable  Thought process: tangential, disorganized, illogical  Thought content: paranoid delusions remain, denies SI  Perceptual disturbances: no appearance of internal preoccupation  Orientation: unable to ascertain due to poor cooperation  Abstraction: concrete  Fund of knowledge: limited  Insight: poor  Judgement: poor

## 2022-07-08 PROCEDURE — 99232 SBSQ HOSP IP/OBS MODERATE 35: CPT

## 2022-07-08 RX ADMIN — CHLORHEXIDINE GLUCONATE 15 MILLILITER(S): 213 SOLUTION TOPICAL at 21:51

## 2022-07-08 RX ADMIN — CHLORHEXIDINE GLUCONATE 15 MILLILITER(S): 213 SOLUTION TOPICAL at 09:32

## 2022-07-08 RX ADMIN — ARIPIPRAZOLE 20 MILLIGRAM(S): 15 TABLET ORAL at 09:32

## 2022-07-08 NOTE — BH INPATIENT PSYCHIATRY PROGRESS NOTE - NSBHASSESSSUMMFT_PSY_ALL_CORE
72 year old  woman, domiciled with daughter though can no longer return, hx of chronic paranoid schizophrenia with multiple prior psychiatric hospitalizations and long standing hx of medication nonadherence, no hx of suicide attempts or suicidality BIB EMS activated by patient’s daughter when pt was found throwing landlord's tools around home and verbally aggressive in the context of delusions that landlord was harassing her. TOO obtained. Patient remains paranoid, disorganized. Very uncooperative with assessments and aftercare planning but otherwise calm and keeps to herself.     Plan:    1) Disposition:   - continue inpatient care due to ongoing psychosis severely impairing function   - unclear where patient will return to at this time - daughter states she is willing to take her back if she is improved  2) Legal status: 9.27 - patient submitted a writ for discharge; treatment team recommends ongoing inpatient treatment due to patient's symptoms rendering her a danger to self due to poor self care and poor engagement in safety measures for her unstable gait  3) Psychotropic medications:  - continue abilify liquid 20mg po daily - will need to switch if there is limited response   - will try to acquire ingrezza for patient's TD  - on TOO - zyprexa 5mg IM if patient refuses PO abilify  - plan for RAYO  4) Non-pharmacologic interventions:  - individual, group, milieu therapy as appropriate  5) Medical comorbidities:  - no acute needs other than gait instability  6) Work up:  - none  7) Social issues: patient not allowing care coordination with family or to obtain records from other providers but we will need to make contact with daughter to inquire if patient allowed to return (daughter is already aware she is here); consider referral to [facility]  8) Psychoeducation: Risks and benefits discussed with patient - she has limited understanding and insight

## 2022-07-08 NOTE — BH INPATIENT PSYCHIATRY PROGRESS NOTE - NSBHCHARTREVIEWVS_PSY_A_CORE FT
Vital Signs Last 24 Hrs  T(C): 36.9 (07-08-22 @ 15:36), Max: 36.9 (07-08-22 @ 15:36)  T(F): 98.4 (07-08-22 @ 15:36), Max: 98.4 (07-08-22 @ 15:36)  HR: --  BP: --  BP(mean): --  RR: --  SpO2: --    Orthostatic VS  07-08-22 @ 07:13  Lying BP: --/-- HR: --  Sitting BP: 131/74 HR: 64  Standing BP: 132/75 HR: 76  Site: --  Mode: --  Orthostatic VS  07-07-22 @ 08:36  Lying BP: --/-- HR: --  Sitting BP: 127/69 HR: 61  Standing BP: 117/62 HR: 71  Site: upper left arm  Mode: electronic

## 2022-07-08 NOTE — BH INPATIENT PSYCHIATRY PROGRESS NOTE - NSBHFUPINTERVALHXFT_PSY_A_CORE
Chart reviewed and case discussed with treatment team. Staff report patient is quiet, keeps to herself but very uncooperative with aftercare planning. Writer again asked her about getting records from other hospitals but this immediately made her upset and she ended the conversation. She is very difficult to assess due to uncooperative behavior.

## 2022-07-08 NOTE — BH INPATIENT PSYCHIATRY PROGRESS NOTE - MSE UNSTRUCTURED FT
Appearance: thin but well nourished; gait remains unsteady due to TD, no tremors noted; casually groomed  Behavior: no psychomotor agitation but quite uncooperative with assessment  Speech: very pressured, dominates conversation; poorly articulated due to TD and poor dentition at baseline;   Mood: unable to assess  Affect: irritable, constricted, stable  Thought process: tangential, disorganized, illogical  Thought content: paranoid delusions remain, denies SI  Perceptual disturbances: no appearance of internal preoccupation  Orientation: unable to ascertain due to poor cooperation  Abstraction: concrete  Fund of knowledge: limited  Insight: poor  Judgement: poor

## 2022-07-08 NOTE — BH INPATIENT PSYCHIATRY PROGRESS NOTE - CURRENT MEDICATION
MEDICATIONS  (STANDING):  ARIPiprazole  Solution 20 milliGRAM(s) Oral daily  chlorhexidine 0.12% Liquid 15 milliLiter(s) Swish and Spit two times a day    MEDICATIONS  (PRN):  LORazepam     Tablet 1 milliGRAM(s) Oral every 8 hours PRN severe anxiety  OLANZapine Injectable 5 milliGRAM(s) IntraMuscular at bedtime PRN refusing abilify PO

## 2022-07-09 RX ADMIN — CHLORHEXIDINE GLUCONATE 15 MILLILITER(S): 213 SOLUTION TOPICAL at 21:54

## 2022-07-09 RX ADMIN — ARIPIPRAZOLE 20 MILLIGRAM(S): 15 TABLET ORAL at 08:57

## 2022-07-09 RX ADMIN — CHLORHEXIDINE GLUCONATE 15 MILLILITER(S): 213 SOLUTION TOPICAL at 09:18

## 2022-07-10 RX ADMIN — CHLORHEXIDINE GLUCONATE 15 MILLILITER(S): 213 SOLUTION TOPICAL at 20:40

## 2022-07-10 RX ADMIN — CHLORHEXIDINE GLUCONATE 15 MILLILITER(S): 213 SOLUTION TOPICAL at 09:02

## 2022-07-10 RX ADMIN — ARIPIPRAZOLE 20 MILLIGRAM(S): 15 TABLET ORAL at 09:02

## 2022-07-11 PROCEDURE — 99232 SBSQ HOSP IP/OBS MODERATE 35: CPT

## 2022-07-11 RX ADMIN — ARIPIPRAZOLE 20 MILLIGRAM(S): 15 TABLET ORAL at 09:05

## 2022-07-11 RX ADMIN — CHLORHEXIDINE GLUCONATE 15 MILLILITER(S): 213 SOLUTION TOPICAL at 09:05

## 2022-07-11 RX ADMIN — CHLORHEXIDINE GLUCONATE 15 MILLILITER(S): 213 SOLUTION TOPICAL at 22:00

## 2022-07-11 NOTE — BH INPATIENT PSYCHIATRY PROGRESS NOTE - NSBHFUPINTERVALHXFT_PSY_A_CORE
Chart reviewed and case discussed with treatment team. Staff report    Chart reviewed and case discussed with treatment team. Staff report patient has had no outbursts. She has been med compliant. She continues to be uncooperative with getting records or contacting family. She continues to claim she doesn't need medications. She remains paranoid about her landlord.

## 2022-07-11 NOTE — BH INPATIENT PSYCHIATRY PROGRESS NOTE - MSE UNSTRUCTURED FT
Appearance: thin but well nourished; gait remains unsteady due to TD, no tremors noted; casually groomed  Behavior: no psychomotor agitation but quite uncooperative with assessment  Speech: very pressured, dominates conversation; poorly articulated due to TD and poor dentition at baseline;   Mood: unable to assess  Affect: irritable, constricted, stable  Thought process: tangential, disorganized, illogical  Thought content: paranoid delusions remain, denies SI  Perceptual disturbances: no appearance of internal preoccupation  Orientation: unable to ascertain due to poor cooperation  Abstraction: concrete  Fund of knowledge: limited  Insight: poor  Judgement: poor     Appearance: gait remains unsteady due to TD, no tremors noted; adequate grooming and hygiene  Behavior: uncooperative, argumentative; no psychomotor agitation or retardation  Speech: poor articulation due to TD and poor dentition; pressured   Mood: unable to assess  Affect: irritable, constricted, stable  Thought process: tangential, perseverative; illogical  Thought content: paranoid delusions continue, no reports of SI  Perceptual disturbances: no appearance of internal preoccupation  Orientation: unable to ascertain due to poor cooperation  Abstraction: concrete  Fund of knowledge: limited  Insight: poor  Judgement: poor
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
## 2022-07-12 PROCEDURE — 99232 SBSQ HOSP IP/OBS MODERATE 35: CPT

## 2022-07-12 RX ORDER — ARIPIPRAZOLE 15 MG/1
10 TABLET ORAL DAILY
Refills: 0 | Status: DISCONTINUED | OUTPATIENT
Start: 2022-07-13 | End: 2022-07-14

## 2022-07-12 RX ORDER — RISPERIDONE 4 MG/1
0.5 TABLET ORAL
Refills: 0 | Status: DISCONTINUED | OUTPATIENT
Start: 2022-07-13 | End: 2022-07-14

## 2022-07-12 RX ADMIN — CHLORHEXIDINE GLUCONATE 15 MILLILITER(S): 213 SOLUTION TOPICAL at 21:38

## 2022-07-12 RX ADMIN — CHLORHEXIDINE GLUCONATE 15 MILLILITER(S): 213 SOLUTION TOPICAL at 09:07

## 2022-07-12 RX ADMIN — ARIPIPRAZOLE 20 MILLIGRAM(S): 15 TABLET ORAL at 09:06

## 2022-07-12 NOTE — BH INPATIENT PSYCHIATRY PROGRESS NOTE - MSE UNSTRUCTURED FT
Appearance: gait remains unsteady due to TD, no tremors noted; adequate grooming and hygiene  Behavior: uncooperative, argumentative; no psychomotor agitation or retardation  Speech: poor articulation due to TD and poor dentition; pressured   Mood: unable to assess  Affect: irritable, constricted, stable  Thought process: tangential, perseverative; illogical  Thought content: paranoid delusions continue, no reports of SI  Perceptual disturbances: no appearance of internal preoccupation  Orientation: unable to ascertain due to poor cooperation  Abstraction: concrete  Fund of knowledge: limited  Insight: poor  Judgement: poor     Appearance: gait remains unsteady due to TD, no tremors noted; adequate grooming and hygiene  Behavior: uncooperative; no psychomotor agitation or retardation  Speech: poor articulation due to TD and poor dentition; pressured   Mood: unable to assess  Affect: irritable, constricted, stable  Thought process: tangential, perseverative; illogical  Thought content: paranoid delusions continue, no reports of SI  Perceptual disturbances: no appearance of internal preoccupation  Orientation: unable to ascertain due to poor cooperation  Abstraction: concrete  Fund of knowledge: limited  Insight: poor  Judgement: poor

## 2022-07-12 NOTE — BH INPATIENT PSYCHIATRY PROGRESS NOTE - PRN MEDS
MEDICATIONS  (PRN):  LORazepam     Tablet 1 milliGRAM(s) Oral every 8 hours PRN severe anxiety or agitation  LORazepam   Injectable 1 milliGRAM(s) IntraMuscular once PRN severe agitation if PO route ineffective or unavailable  OLANZapine Injectable 5 milliGRAM(s) IntraMuscular at bedtime PRN refusing abilify PO

## 2022-07-12 NOTE — BH INPATIENT PSYCHIATRY PROGRESS NOTE - NSBHCHARTREVIEWVS_PSY_A_CORE FT
Vital Signs Last 24 Hrs  T(C): 36.8 (07-12-22 @ 05:57), Max: 36.8 (07-12-22 @ 05:57)  T(F): 98.3 (07-12-22 @ 05:57), Max: 98.3 (07-12-22 @ 05:57)  HR: --  BP: --  BP(mean): --  RR: --  SpO2: --    Orthostatic VS  07-12-22 @ 05:57  Lying BP: 137/66 HR: 66  Sitting BP: 129/76 HR: 79  Standing BP: --/-- HR: --  Site: upper left arm  Mode: electronic  Orthostatic VS  07-11-22 @ 08:00  Lying BP: --/-- HR: --  Sitting BP: 115/74 HR: 66  Standing BP: 115/72 HR: 78  Site: --  Mode: --   Vital Signs Last 24 Hrs  T(C): 36.6 (07-12-22 @ 15:42), Max: 36.8 (07-12-22 @ 05:57)  T(F): 97.8 (07-12-22 @ 15:42), Max: 98.3 (07-12-22 @ 05:57)  HR: --  BP: --  BP(mean): --  RR: --  SpO2: --    Orthostatic VS  07-12-22 @ 05:57  Lying BP: 137/66 HR: 66  Sitting BP: 129/76 HR: 79  Standing BP: --/-- HR: --  Site: upper left arm  Mode: electronic  Orthostatic VS  07-11-22 @ 08:00  Lying BP: --/-- HR: --  Sitting BP: 115/74 HR: 66  Standing BP: 115/72 HR: 78  Site: --  Mode: --
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
## 2022-07-12 NOTE — BH INPATIENT PSYCHIATRY PROGRESS NOTE - CURRENT MEDICATION
MEDICATIONS  (STANDING):  ARIPiprazole  Solution 20 milliGRAM(s) Oral daily  chlorhexidine 0.12% Liquid 15 milliLiter(s) Swish and Spit two times a day    MEDICATIONS  (PRN):  LORazepam     Tablet 1 milliGRAM(s) Oral every 8 hours PRN severe anxiety or agitation  LORazepam   Injectable 1 milliGRAM(s) IntraMuscular once PRN severe agitation if PO route ineffective or unavailable  OLANZapine Injectable 5 milliGRAM(s) IntraMuscular at bedtime PRN refusing abilify PO

## 2022-07-13 PROCEDURE — 99232 SBSQ HOSP IP/OBS MODERATE 35: CPT

## 2022-07-13 RX ORDER — OLANZAPINE 15 MG/1
5 TABLET, FILM COATED ORAL AT BEDTIME
Refills: 0 | Status: DISCONTINUED | OUTPATIENT
Start: 2022-07-13 | End: 2022-08-05

## 2022-07-13 RX ADMIN — RISPERIDONE 0.5 MILLIGRAM(S): 4 TABLET ORAL at 09:18

## 2022-07-13 RX ADMIN — CHLORHEXIDINE GLUCONATE 15 MILLILITER(S): 213 SOLUTION TOPICAL at 22:37

## 2022-07-13 RX ADMIN — CHLORHEXIDINE GLUCONATE 15 MILLILITER(S): 213 SOLUTION TOPICAL at 09:19

## 2022-07-13 RX ADMIN — ARIPIPRAZOLE 10 MILLIGRAM(S): 15 TABLET ORAL at 09:17

## 2022-07-13 RX ADMIN — RISPERIDONE 0.5 MILLIGRAM(S): 4 TABLET ORAL at 21:40

## 2022-07-13 NOTE — BH INPATIENT PSYCHIATRY PROGRESS NOTE - MSE UNSTRUCTURED FT
Appearance: gait remains unsteady due to TD, no tremors noted; adequate grooming and hygiene  Behavior: uncooperative; no psychomotor agitation or retardation  Speech: poor articulation due to TD and poor dentition; pressured   Mood: unable to assess  Affect: irritable, constricted, stable  Thought process: tangential, perseverative; illogical  Thought content: paranoid delusions continue, no reports of SI  Perceptual disturbances: no appearance of internal preoccupation  Orientation: unable to ascertain due to poor cooperation  Abstraction: concrete  Fund of knowledge: limited  Insight: poor  Judgement: poor     Appearance: gait remains unsteady due to TD, no tremors noted; adequate grooming and hygiene  Behavior: remains guarded and uncooperative; no psychomotor agitation or retardation  Speech: poor articulation due to TD and poor dentition; pressured and frequently talking over this writer  Mood: "fine"  Affect: irritable, constricted, stable  Thought process: illogical, perseverative, concrete  Thought content: paranoid delusions continue, no reports of SI  Perceptual disturbances: no appearance of internal preoccupation  Orientation: unable to ascertain due to poor cooperation  Abstraction: concrete  Fund of knowledge: limited  Insight: poor  Judgement: poor

## 2022-07-13 NOTE — BH INPATIENT PSYCHIATRY PROGRESS NOTE - NSBHCHARTREVIEWVS_PSY_A_CORE FT
Vital Signs Last 24 Hrs  T(C): 36.4 (07-13-22 @ 15:37), Max: 36.8 (07-13-22 @ 08:04)  T(F): 97.5 (07-13-22 @ 15:37), Max: 98.3 (07-13-22 @ 08:04)  HR: --  BP: --  BP(mean): --  RR: 17 (07-13-22 @ 08:04) (17 - 17)  SpO2: --    Orthostatic VS  07-13-22 @ 08:04  Lying BP: --/-- HR: --  Sitting BP: 115/73 HR: 79  Standing BP: 124/82 HR: 62  Site: --  Mode: --  Orthostatic VS  07-12-22 @ 05:57  Lying BP: 137/66 HR: 66  Sitting BP: 129/76 HR: 79  Standing BP: --/-- HR: --  Site: upper left arm  Mode: electronic
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
## 2022-07-13 NOTE — BH INPATIENT PSYCHIATRY PROGRESS NOTE - NSBHFUPINTERVALHXFT_PSY_A_CORE
Chart reviewed and case discussed with treatment team. Staff report patient continues to be compliant with medications. Patient initially refused to discuss care this morning. She stated she has not yet been able to talk to her  about the outcome of yesterday's hearing. Writer did revisit her later on. She    Chart reviewed and case discussed with treatment team. Staff report patient continues to be compliant with medications. Patient initially refused to discuss care this morning. She stated she has not yet been able to talk to her  about the outcome of yesterday's hearing. Writer did revisit her later on. She remains paranoid. She states repeatedly she will not share any personal information. She refused to discuss even benign information like where she grew up. Patient states she'll take whatever medication she needs to take to leave the hospital. Conversation is difficult due to patient talking over this writer and repeating her statements.

## 2022-07-13 NOTE — BH INPATIENT PSYCHIATRY PROGRESS NOTE - CURRENT MEDICATION
MEDICATIONS  (STANDING):  ARIPiprazole  Solution 10 milliGRAM(s) Oral daily  chlorhexidine 0.12% Liquid 15 milliLiter(s) Swish and Spit two times a day  risperiDONE   Solution 0.5 milliGRAM(s) Oral two times a day    MEDICATIONS  (PRN):  LORazepam     Tablet 1 milliGRAM(s) Oral every 8 hours PRN severe anxiety or agitation  LORazepam   Injectable 1 milliGRAM(s) IntraMuscular once PRN severe agitation if PO route ineffective or unavailable  OLANZapine Injectable 5 milliGRAM(s) IntraMuscular at bedtime PRN refusing abilify PO

## 2022-07-14 PROCEDURE — 99232 SBSQ HOSP IP/OBS MODERATE 35: CPT

## 2022-07-14 RX ORDER — RISPERIDONE 4 MG/1
0.75 TABLET ORAL
Refills: 0 | Status: DISCONTINUED | OUTPATIENT
Start: 2022-07-14 | End: 2022-07-19

## 2022-07-14 RX ORDER — ARIPIPRAZOLE 15 MG/1
5 TABLET ORAL DAILY
Refills: 0 | Status: DISCONTINUED | OUTPATIENT
Start: 2022-07-14 | End: 2022-07-19

## 2022-07-14 RX ADMIN — CHLORHEXIDINE GLUCONATE 15 MILLILITER(S): 213 SOLUTION TOPICAL at 20:24

## 2022-07-14 RX ADMIN — RISPERIDONE 0.75 MILLIGRAM(S): 4 TABLET ORAL at 20:23

## 2022-07-14 RX ADMIN — RISPERIDONE 0.5 MILLIGRAM(S): 4 TABLET ORAL at 09:17

## 2022-07-14 RX ADMIN — ARIPIPRAZOLE 10 MILLIGRAM(S): 15 TABLET ORAL at 09:17

## 2022-07-14 RX ADMIN — CHLORHEXIDINE GLUCONATE 15 MILLILITER(S): 213 SOLUTION TOPICAL at 09:17

## 2022-07-14 NOTE — BH INPATIENT PSYCHIATRY PROGRESS NOTE - NSBHCHARTREVIEWVS_PSY_A_CORE FT
Vital Signs Last 24 Hrs  T(C): 35.7 (07-14-22 @ 15:43), Max: 35.9 (07-14-22 @ 08:06)  T(F): 96.2 (07-14-22 @ 15:43), Max: 96.6 (07-14-22 @ 08:06)  HR: --  BP: --  BP(mean): --  RR: --  SpO2: --    Orthostatic VS  07-14-22 @ 08:06  Lying BP: --/-- HR: --  Sitting BP: 119/74 HR: 71  Standing BP: 116/73 HR: 87  Site: --  Mode: --  Orthostatic VS  07-13-22 @ 08:04  Lying BP: --/-- HR: --  Sitting BP: 115/73 HR: 79  Standing BP: 124/82 HR: 62  Site: --  Mode: --
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
## 2022-07-14 NOTE — BH INPATIENT PSYCHIATRY PROGRESS NOTE - MSE UNSTRUCTURED FT
Appearance: gait remains unsteady due to TD, no tremors noted; adequate grooming and hygiene  Behavior: remains guarded and uncooperative; no psychomotor agitation or retardation  Speech: poor articulation due to TD and poor dentition; pressured and frequently talking over this writer  Mood: "fine"  Affect: irritable, constricted, stable  Thought process: illogical, perseverative, concrete  Thought content: paranoid delusions continue, no reports of SI  Perceptual disturbances: no appearance of internal preoccupation  Orientation: unable to ascertain due to poor cooperation  Abstraction: concrete  Fund of knowledge: limited  Insight: poor  Judgement: poor     Appearance: fair grooming, intact hygiene; poor dentition; TD is less severe  Behavior: slightly less guarded; no psychomotor agitation or retardation; poorly cooperative with discussion as she frequently talks over this writer  Speech: poor articulation, pressured, normal volume  Mood: "fine"  Affect: irritable, constricted, stable  Thought process: illogical, disorganized  Thought content: paranoid delusions continue, no reports of SI  Perceptual disturbances: no appearance of internal preoccupation  Orientation: unable to ascertain due to poor cooperation  Abstraction: concrete  Fund of knowledge: limited  Insight: poor  Judgement: poor

## 2022-07-14 NOTE — BH INPATIENT PSYCHIATRY PROGRESS NOTE - CURRENT MEDICATION
MEDICATIONS  (STANDING):  ARIPiprazole  Solution 10 milliGRAM(s) Oral daily  chlorhexidine 0.12% Liquid 15 milliLiter(s) Swish and Spit two times a day  risperiDONE   Solution 0.5 milliGRAM(s) Oral two times a day    MEDICATIONS  (PRN):  LORazepam     Tablet 1 milliGRAM(s) Oral every 8 hours PRN severe anxiety or agitation  LORazepam   Injectable 1 milliGRAM(s) IntraMuscular once PRN severe agitation if PO route ineffective or unavailable  OLANZapine Injectable 5 milliGRAM(s) IntraMuscular at bedtime PRN refusing abilify PO   MEDICATIONS  (STANDING):  ARIPiprazole  Solution 5 milliGRAM(s) Oral daily  chlorhexidine 0.12% Liquid 15 milliLiter(s) Swish and Spit two times a day  risperiDONE   Solution 0.75 milliGRAM(s) Oral two times a day    MEDICATIONS  (PRN):  LORazepam     Tablet 1 milliGRAM(s) Oral every 8 hours PRN severe anxiety or agitation  LORazepam   Injectable 1 milliGRAM(s) IntraMuscular once PRN severe agitation if PO route ineffective or unavailable  OLANZapine Injectable 5 milliGRAM(s) IntraMuscular at bedtime PRN refusing abilify PO

## 2022-07-15 RX ADMIN — RISPERIDONE 0.75 MILLIGRAM(S): 4 TABLET ORAL at 09:53

## 2022-07-15 RX ADMIN — CHLORHEXIDINE GLUCONATE 15 MILLILITER(S): 213 SOLUTION TOPICAL at 09:53

## 2022-07-15 RX ADMIN — CHLORHEXIDINE GLUCONATE 15 MILLILITER(S): 213 SOLUTION TOPICAL at 21:57

## 2022-07-15 RX ADMIN — ARIPIPRAZOLE 5 MILLIGRAM(S): 15 TABLET ORAL at 09:53

## 2022-07-15 RX ADMIN — RISPERIDONE 0.75 MILLIGRAM(S): 4 TABLET ORAL at 21:57

## 2022-07-15 NOTE — BH INPATIENT PSYCHIATRY PROGRESS NOTE - NSBHCHARTREVIEWVS_PSY_A_CORE FT
Vital Signs Last 24 Hrs  T(C): 37 (07-15-22 @ 15:47), Max: 37 (07-15-22 @ 15:47)  T(F): 98.6 (07-15-22 @ 15:47), Max: 98.6 (07-15-22 @ 15:47)  HR: --  BP: --  BP(mean): --  RR: --  SpO2: --    Orthostatic VS  07-15-22 @ 08:10  Lying BP: --/-- HR: --  Sitting BP: 125/59 HR: 74  Standing BP: 130/82 HR: 90  Site: --  Mode: --  Orthostatic VS  07-14-22 @ 08:06  Lying BP: --/-- HR: --  Sitting BP: 119/74 HR: 71  Standing BP: 116/73 HR: 87  Site: --  Mode: --

## 2022-07-15 NOTE — BH INPATIENT PSYCHIATRY PROGRESS NOTE - MSE UNSTRUCTURED FT
Appearance: adequate grooming and hygiene; poor dentition; TD is less severe  Behavior: better eye contact, less resistant to discussion about medications; no psychomotor agitation or retardation;  Speech: poor articulation, not pressured today, normal volume  Mood: denies complaints  Affect: neutral, constricted, stable  Thought process: illogical, disorganized  Thought content: paranoid delusions continue, no reports of SI  Perceptual disturbances: no appearance of internal preoccupation  Orientation: unable to ascertain due to poor cooperation  Abstraction: concrete  Fund of knowledge: limited  Insight: poor  Judgement: poor

## 2022-07-15 NOTE — BH INPATIENT PSYCHIATRY PROGRESS NOTE - CURRENT MEDICATION
MEDICATIONS  (STANDING):  ARIPiprazole  Solution 5 milliGRAM(s) Oral daily  chlorhexidine 0.12% Liquid 15 milliLiter(s) Swish and Spit two times a day  risperiDONE   Solution 0.75 milliGRAM(s) Oral two times a day    MEDICATIONS  (PRN):  LORazepam     Tablet 1 milliGRAM(s) Oral every 8 hours PRN severe anxiety or agitation  LORazepam   Injectable 1 milliGRAM(s) IntraMuscular once PRN severe agitation if PO route ineffective or unavailable  OLANZapine Injectable 5 milliGRAM(s) IntraMuscular at bedtime PRN refusing abilify PO

## 2022-07-15 NOTE — BH INPATIENT PSYCHIATRY PROGRESS NOTE - NSBHFUPINTERVALHXFT_PSY_A_CORE
Chart reviewed and case discussed with treatment team. Staff report patient has been calm and superficially cooperative. Patient allowed writer to talk to her about medication changes today - she normally is very intolerant of this. Patient remains paranoid about landlord and refusing to provide consent to talk to family or get records. Med compliant.

## 2022-07-16 RX ADMIN — ARIPIPRAZOLE 5 MILLIGRAM(S): 15 TABLET ORAL at 10:38

## 2022-07-16 RX ADMIN — RISPERIDONE 0.75 MILLIGRAM(S): 4 TABLET ORAL at 20:36

## 2022-07-16 RX ADMIN — RISPERIDONE 0.75 MILLIGRAM(S): 4 TABLET ORAL at 10:38

## 2022-07-16 RX ADMIN — CHLORHEXIDINE GLUCONATE 15 MILLILITER(S): 213 SOLUTION TOPICAL at 10:39

## 2022-07-16 RX ADMIN — CHLORHEXIDINE GLUCONATE 15 MILLILITER(S): 213 SOLUTION TOPICAL at 20:36

## 2022-07-17 RX ADMIN — ARIPIPRAZOLE 5 MILLIGRAM(S): 15 TABLET ORAL at 09:22

## 2022-07-17 RX ADMIN — RISPERIDONE 0.75 MILLIGRAM(S): 4 TABLET ORAL at 09:22

## 2022-07-17 RX ADMIN — CHLORHEXIDINE GLUCONATE 15 MILLILITER(S): 213 SOLUTION TOPICAL at 09:22

## 2022-07-17 RX ADMIN — CHLORHEXIDINE GLUCONATE 15 MILLILITER(S): 213 SOLUTION TOPICAL at 20:25

## 2022-07-17 RX ADMIN — RISPERIDONE 0.75 MILLIGRAM(S): 4 TABLET ORAL at 20:23

## 2022-07-18 PROCEDURE — 99232 SBSQ HOSP IP/OBS MODERATE 35: CPT

## 2022-07-18 RX ADMIN — RISPERIDONE 0.75 MILLIGRAM(S): 4 TABLET ORAL at 20:19

## 2022-07-18 RX ADMIN — RISPERIDONE 0.75 MILLIGRAM(S): 4 TABLET ORAL at 09:10

## 2022-07-18 RX ADMIN — CHLORHEXIDINE GLUCONATE 15 MILLILITER(S): 213 SOLUTION TOPICAL at 20:20

## 2022-07-18 RX ADMIN — ARIPIPRAZOLE 5 MILLIGRAM(S): 15 TABLET ORAL at 09:09

## 2022-07-18 RX ADMIN — CHLORHEXIDINE GLUCONATE 15 MILLILITER(S): 213 SOLUTION TOPICAL at 09:10
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
## 2022-07-18 NOTE — BH INPATIENT PSYCHIATRY PROGRESS NOTE - NSBHASSESSSUMMFT_PSY_ALL_CORE
72 year old  woman, domiciled with daughter, hx of chronic paranoid schizophrenia with multiple prior psychiatric hospitalizations and long standing hx of medication nonadherence, no hx of suicide attempts or suicidality, BIB EMS activated by patient’s daughter when pt was found throwing landlord's tools out of the building, verbally abusive in the context of delusions that landlord was harassing her. Daughter reports patient's behavior has been very concerning due to inability to care for self (severe weight loss) and confrontational behavior that gets her into trouble with people around her. TOO obtained - has been med compliant but with limited impact. Patient remains paranoid, disorganized, uncooperative with assessments and aftercare planning but otherwise calm and keeps to herself.     Plan:    1) Disposition:   - unclear where patient will return to at this time - daughter told SW she is willing to take her back if she is improved  2) Legal status: 9.27 - application for discharge denied by courts today  3) Psychotropic medications:  - discontinue abilify liquid 5mg po daily and increase risperidone conc to 1mg po bid - will titrate faster if no adverse effects noted  - will try to acquire ingrezza for patient's TD  - on TOO - zyprexa 5mg IM if patient refuses PO abilify or risperidone  - plan for RAYO  4) Non-pharmacologic interventions:  - individual, group, milieu therapy as appropriate  5) Medical comorbidities:  - no acute needs other than gait instability  6) Work up:  - none  7) Social issues: patient not allowing care coordination with family or to obtain records from other providers but we will need to make contact with daughter to inquire if patient allowed to return (daughter is already aware she is here); will refer to AOT which may allow treatment team obtain records  8) Psychoeducation: Risks and benefits discussed with patient - she has limited understanding and insight

## 2022-07-18 NOTE — BH INPATIENT PSYCHIATRY PROGRESS NOTE - NSBHCHARTREVIEWVS_PSY_A_CORE FT
Vital Signs Last 24 Hrs  T(C): 36.8 (07-18-22 @ 07:17), Max: 36.8 (07-18-22 @ 07:17)  T(F): 98.3 (07-18-22 @ 07:17), Max: 98.3 (07-18-22 @ 07:17)  HR: --  BP: --  BP(mean): --  RR: --  SpO2: --    Orthostatic VS  07-18-22 @ 07:17  Lying BP: --/-- HR: --  Sitting BP: 133/72 HR: 72  Standing BP: 123/67 HR: 80  Site: --  Mode: --  Orthostatic VS  07-17-22 @ 09:10  Lying BP: --/-- HR: --  Sitting BP: 115/70 HR: 73  Standing BP: 134/75 HR: 87  Site: --  Mode: --

## 2022-07-18 NOTE — BH INPATIENT PSYCHIATRY PROGRESS NOTE - MSE UNSTRUCTURED FT
Appearance: poor oral hygiene again; no other obvious deficits in hygiene; TD is of upper and lower extremities  Behavior: uncooperative, dominates conversation; refusing to discuss care; no psychomotor agitation or retardation;  Speech: poor articulation, pressured, normal volume  Mood: denies complaints  Affect: neutral, constricted, stable  Thought process: illogical, disorganized  Thought content: paranoid delusions continue, no reports of SI  Perceptual disturbances: no appearance of internal preoccupation  Orientation: unable to ascertain due to poor cooperation  Abstraction: concrete  Fund of knowledge: limited  Insight: poor  Judgement: poor

## 2022-07-19 PROCEDURE — 99232 SBSQ HOSP IP/OBS MODERATE 35: CPT

## 2022-07-19 RX ORDER — RISPERIDONE 4 MG/1
1 TABLET ORAL
Refills: 0 | Status: DISCONTINUED | OUTPATIENT
Start: 2022-07-19 | End: 2022-07-21

## 2022-07-19 RX ADMIN — CHLORHEXIDINE GLUCONATE 15 MILLILITER(S): 213 SOLUTION TOPICAL at 20:21

## 2022-07-19 RX ADMIN — CHLORHEXIDINE GLUCONATE 15 MILLILITER(S): 213 SOLUTION TOPICAL at 11:09

## 2022-07-19 RX ADMIN — ARIPIPRAZOLE 5 MILLIGRAM(S): 15 TABLET ORAL at 11:08

## 2022-07-19 RX ADMIN — RISPERIDONE 1 MILLIGRAM(S): 4 TABLET ORAL at 20:20

## 2022-07-19 RX ADMIN — RISPERIDONE 0.75 MILLIGRAM(S): 4 TABLET ORAL at 11:09

## 2022-07-19 NOTE — BH INPATIENT PSYCHIATRY PROGRESS NOTE - NSBHASSESSSUMMFT_PSY_ALL_CORE
72 year old  woman, domiciled with daughter, hx of chronic paranoid schizophrenia with multiple prior psychiatric hospitalizations and long standing hx of medication nonadherence, no hx of suicide attempts or suicidality, BIB EMS activated by patient’s daughter when pt was found throwing landlord's tools out of the building, verbally abusive in the context of delusions that landlord was harassing her. Daughter reports patient's behavior has been very concerning due to inability to care for self (severe weight loss) and confrontational behavior that gets her into trouble with people around her. TOO obtained - has been med compliant but with limited impact. Patient remains paranoid, guarded, disorganized, uncooperative with assessments and aftercare planning but has had no outbursts.     Plan:    1) Disposition:   - unclear where patient will return to at this time - daughter told SW she is willing to take her back if she is improved and on higher level of care  2) Legal status: 9.27 - application for discharge denied by courts   3) Psychotropic medications:  - continue risperidone conc 1mg po bid - will titrate faster if no adverse effects noted  - will try to acquire ingrezza for patient's TD  - on TOO - zyprexa 5mg IM if patient refuses PO abilify or risperidone  - plan for RAYO  4) Non-pharmacologic interventions:  - individual, group, milieu therapy as appropriate  5) Medical comorbidities:  - no acute needs other than gait instability  6) Work up:  - none  7) Social issues: patient not allowing care coordination with family or to obtain records from other providers but we will need to make contact with daughter to inquire if patient allowed to return (daughter is already aware she is here); will refer to AOT which may allow treatment team obtain records  8) Psychoeducation: Risks and benefits discussed with patient - she has limited understanding and insight

## 2022-07-19 NOTE — BH INPATIENT PSYCHIATRY PROGRESS NOTE - CURRENT MEDICATION
MEDICATIONS  (STANDING):  chlorhexidine 0.12% Liquid 15 milliLiter(s) Swish and Spit two times a day  risperiDONE   Solution 1 milliGRAM(s) Oral two times a day    MEDICATIONS  (PRN):  OLANZapine Injectable 5 milliGRAM(s) IntraMuscular at bedtime PRN refusing abilify PO

## 2022-07-19 NOTE — BH INPATIENT PSYCHIATRY PROGRESS NOTE - MSE UNSTRUCTURED FT
Appearance: poor oral hygiene; dressed in hospital gown; tardive of upper, lower extremities as well as mouth  Behavior: poorly cooperative, dominates conversation; refusing to discuss care; some psychomotor agitation when challenged  Speech: pressured, poor articulation, pressured, normal volume  Mood: denies complaints  Affect: more irritable, constricted, stable  Thought process: illogical, disorganized  Thought content: paranoid delusions continue regarding her landlord and in her refusal to share or discuss treatment history, no reports of SI  Perceptual disturbances: no appearance of internal preoccupation  Orientation: unable to ascertain due to poor cooperation  Abstraction: concrete  Fund of knowledge: limited  Insight: poor  Judgement: poor

## 2022-07-19 NOTE — BH INPATIENT PSYCHIATRY PROGRESS NOTE - NSBHFUPINTERVALHXFT_PSY_A_CORE
Chart reviewed and case discussed with treatment team. Staff report patient continues to be compliant with medications. She is not going to groups. Remains guarded and isolative. Patient was updated on treatment today. Writer invited her to discuss her psychiatric admission to our hospital in 2016 but she simply denied it ever occurred. Records from that admission suggest she has a h/o aggression towards daughter and neighbors. She had a problem with her landlord then as well. Patient proceeded to repeatedly state she was never here before, that there is nothing wrong with her, etc. Writer ended the conversation due to her dominating the conversation and she continued to follow this writer around the day room to continue repeating herself.

## 2022-07-19 NOTE — BH INPATIENT PSYCHIATRY PROGRESS NOTE - PRN MEDS
MEDICATIONS  (PRN):  OLANZapine Injectable 5 milliGRAM(s) IntraMuscular at bedtime PRN refusing abilify PO

## 2022-07-19 NOTE — BH INPATIENT PSYCHIATRY PROGRESS NOTE - NSBHCHARTREVIEWVS_PSY_A_CORE FT
Vital Signs Last 24 Hrs  T(C): 37.2 (07-19-22 @ 15:42), Max: 37.2 (07-19-22 @ 15:42)  T(F): 99 (07-19-22 @ 15:42), Max: 99 (07-19-22 @ 15:42)  HR: --  BP: --  BP(mean): --  RR: --  SpO2: --    Orthostatic VS  07-19-22 @ 10:27  Lying BP: --/-- HR: --  Sitting BP: 111/66 HR: 69  Standing BP: 122/70 HR: 87  Site: --  Mode: --  Orthostatic VS  07-18-22 @ 07:17  Lying BP: --/-- HR: --  Sitting BP: 133/72 HR: 72  Standing BP: 123/67 HR: 80  Site: --  Mode: --

## 2022-07-20 PROCEDURE — 99232 SBSQ HOSP IP/OBS MODERATE 35: CPT

## 2022-07-20 RX ADMIN — CHLORHEXIDINE GLUCONATE 15 MILLILITER(S): 213 SOLUTION TOPICAL at 20:34

## 2022-07-20 RX ADMIN — CHLORHEXIDINE GLUCONATE 15 MILLILITER(S): 213 SOLUTION TOPICAL at 09:19

## 2022-07-20 RX ADMIN — RISPERIDONE 1 MILLIGRAM(S): 4 TABLET ORAL at 09:19

## 2022-07-20 RX ADMIN — RISPERIDONE 1 MILLIGRAM(S): 4 TABLET ORAL at 20:34

## 2022-07-20 NOTE — BH INPATIENT PSYCHIATRY PROGRESS NOTE - NSBHCHARTREVIEWVS_PSY_A_CORE FT
Vital Signs Last 24 Hrs  T(C): 37.2 (07-20-22 @ 15:44), Max: 37.2 (07-20-22 @ 15:44)  T(F): 99 (07-20-22 @ 15:44), Max: 99 (07-20-22 @ 15:44)  HR: --  BP: --  BP(mean): --  RR: --  SpO2: --    Orthostatic VS  07-20-22 @ 08:08  Lying BP: --/-- HR: --  Sitting BP: 108/68 HR: 75  Standing BP: 110/70 HR: 81  Site: upper left arm  Mode: electronic  Orthostatic VS  07-19-22 @ 10:27  Lying BP: --/-- HR: --  Sitting BP: 111/66 HR: 69  Standing BP: 122/70 HR: 87  Site: --  Mode: --

## 2022-07-20 NOTE — BH INPATIENT PSYCHIATRY PROGRESS NOTE - MSE UNSTRUCTURED FT
Appearance: poor oral hygiene; dressed in hospital gown; tardive of upper, lower extremities as well as mouth  Behavior: poorly cooperative, dominates conversation; refusing to discuss care; some psychomotor agitation when challenged  Speech: pressured, poor articulation, pressured, normal volume  Mood: denies complaints  Affect: more irritable, constricted, stable  Thought process: illogical, disorganized  Thought content: paranoid delusions continue regarding her landlord and in her refusal to share or discuss treatment history, no reports of SI  Perceptual disturbances: no appearance of internal preoccupation  Orientation: unable to ascertain due to poor cooperation  Abstraction: concrete  Fund of knowledge: limited  Insight: poor  Judgement: poor     Appearance: dressed in hospital gown, has poor oral hygiene; TD improved  Behavior: uncooperative with assessment, withdrawn  Speech: wnl  Mood: denies complaints  Affect: constricted, stable, irritable  Thought process: illogical, disorganized  Thought content: paranoid delusions continue regarding her landlord and in her refusal to share or discuss treatment history, no reports of SI  Perceptual disturbances: no appearance of internal preoccupation  Orientation: unable to ascertain due to poor cooperation  Abstraction: concrete  Fund of knowledge: limited  Insight: poor  Judgement: poor

## 2022-07-20 NOTE — BH INPATIENT PSYCHIATRY PROGRESS NOTE - NSBHASSESSSUMMFT_PSY_ALL_CORE
72 year old  woman, domiciled with daughter, hx of chronic paranoid schizophrenia with multiple prior psychiatric hospitalizations and long standing hx of medication nonadherence, no hx of suicide attempts or suicidality, BIB EMS activated by patient’s daughter when pt was found throwing landlord's tools out of the building, verbally abusive in the context of delusions that landlord was harassing her. Daughter reports patient's behavior has been very concerning due to inability to care for self (severe weight loss) and confrontational behavior that gets her into trouble with people around her. TOO obtained - has been med compliant but with limited impact. Patient remains paranoid, guarded, disorganized, uncooperative with assessments and aftercare planning but has had no outbursts.     Plan:    1) Disposition:   - unclear where patient will return to at this time - daughter told SW she is willing to take her back if she is improved and on higher level of care  2) Legal status: 9.27 - application for discharge denied by courts   3) Psychotropic medications:  - increase risperidone conc to 1.5mg po bid - will titrate to effect  - will try to acquire ingrezza for patient's TD though it has been improving with risperidone  - on TOO - zyprexa 5mg IM if patient refuses PO abilify or risperidone  - plan for RAYO  4) Non-pharmacologic interventions:  - individual, group, milieu therapy as appropriate  5) Medical comorbidities:  - no acute needs other than gait instability  6) Work up:  - none  7) Social issues: patient not allowing care coordination with family or to obtain records from other providers but we will need to make contact with daughter to inquire if patient allowed to return (daughter is already aware she is here); will refer to AOT which may allow treatment team obtain records  8) Psychoeducation: Risks and benefits discussed with patient - she has limited understanding and insight

## 2022-07-20 NOTE — BH INPATIENT PSYCHIATRY PROGRESS NOTE - NSCGISEVERILLNESS_PSY_ALL_CORE
Discharge teaching provided for pt regarding treatment received, medications prescribed, and follow up care. Pt verbalized understanding of all discharge instructions. All questions answered. Pt left ambulatory with discharge paperwork in hand. 6 = Severely ill - disruptive pathology, behavior and function are frequently influenced by symptoms, may require assistance from others

## 2022-07-21 PROCEDURE — 99232 SBSQ HOSP IP/OBS MODERATE 35: CPT

## 2022-07-21 RX ORDER — RISPERIDONE 4 MG/1
1.5 TABLET ORAL
Refills: 0 | Status: DISCONTINUED | OUTPATIENT
Start: 2022-07-21 | End: 2022-07-25

## 2022-07-21 RX ADMIN — CHLORHEXIDINE GLUCONATE 15 MILLILITER(S): 213 SOLUTION TOPICAL at 11:31

## 2022-07-21 RX ADMIN — CHLORHEXIDINE GLUCONATE 15 MILLILITER(S): 213 SOLUTION TOPICAL at 20:32

## 2022-07-21 RX ADMIN — RISPERIDONE 1.5 MILLIGRAM(S): 4 TABLET ORAL at 20:30

## 2022-07-21 RX ADMIN — RISPERIDONE 1.5 MILLIGRAM(S): 4 TABLET ORAL at 11:37

## 2022-07-21 NOTE — BH INPATIENT PSYCHIATRY PROGRESS NOTE - MSE UNSTRUCTURED FT
Appearance: dressed in hospital gown, has poor oral hygiene; TD improved  Behavior: uncooperative with assessment, withdrawn  Speech: wnl  Mood: denies complaints  Affect: constricted, stable, irritable  Thought process: illogical, disorganized  Thought content: paranoid delusions continue regarding her landlord and in her refusal to share or discuss treatment history, no reports of SI  Perceptual disturbances: no appearance of internal preoccupation  Orientation: unable to ascertain due to poor cooperation  Abstraction: concrete  Fund of knowledge: limited  Insight: poor  Judgement: poor     Appearance: has poor oral hygiene; dressed in hospital gown; moderate TD   Behavior: superficially engaged; dominates conversation; no psychomotor agitation or retardation  Speech: poor articulation, soft, difficult to understand yet pressured once she gets irritated and feeling defensive  Mood: denies complaints  Affect: constricted, stable, irritable  Thought process: illogical, disorganized, easily derailed  Thought content: paranoid delusions continue regarding her landlord and in her refusal to share or discuss treatment history, no reports of SI  Perceptual disturbances: no appearance of internal preoccupation  Orientation: unable to ascertain due to poor cooperation  Abstraction: concrete  Fund of knowledge: limited  Insight: poor  Judgement: poor

## 2022-07-21 NOTE — UM REPORT PROGRESS NOTE - NSUMSWPRIORDCCOM_GEN_A_CORE FT
did not follow up with repeated hx of hospitalizations.  The patient did not follow up with outpatient treatment resulting in repeated hx of hospitalizations.

## 2022-07-21 NOTE — UM REPORT PROGRESS NOTE - NSUMSWNOTE_GEN_A_CORE FT
pt slightly improved but requires continued medication. paranoia continues.  denies mental illness. clearly states will not accept medication upon discharge. does not agree with AOT/ACT.   MOO inplace at [facility].  Family will only accept home if ACT/AOT (record noted they had requested of [facility] inpt in 2016 admission but not initiated by hospital.  Pt continues to be paranoid towards her landlord and does not wish for hospital to interface with daughter, whom pt believes supports landlord .  MD determined pt does not have capacity. Daughter has advised for years she has intervened with landlord who threatens eviction due to patients verbal threats and behavior towards him, neighbors and passerbys. Daughter wishes for pt to have aot/act and return home, while her brother - pt son-  has suggested hospital arrange for anything other than return home.  pt continues to require medication adjustments.  denies need for treatment upon dc.  declines need for act/aot pt continues to require medication adjustments.  denies need for treatment upon dc.  declines need for act/aot. believes will be evicted if she has visitors  aka act staff  -as per malachi.. no visitors.    pt is calmer and more redirectable.  on  8/12 upset with continued inpt and plan.. she called [facility], [facility] then emailed team and cecilio and dr Jara responded.  no additional actions from [facility] known at this time.  pt continues to require medication adjustments.  denies need for treatment upon dc.  declines need for act/aot.  pt is calmer and more redirectable.  A supported pt signing releases of info to hospitals I as will be used to facilitate dc.  pt signed.   nikia dec number 1 on 8/23. pt continues to require medication adjustments.   declines need for act/aot. pt is better oriented but has no insight and declines need for medication. she denies she had outbursts with her landlord but remains paranoid towards him.     haldol dec number 1 on 8/23.  next due 8/31 The patient continues to require medication adjustments.  She has poor insight into the need for ACT and AOT.  Pt is better oriented but has no insight and declines need for medication.  She denies she had outbursts with her landlord but remains paranoid towards him.     Haldol deco number 1 on 8/23 with next due 8/31.  Currently - Per team, the patient's mood remains depressed and with a sad affect.  The pt. stated that she worries about her bills and D/C plans.  Patient with pending AOT.  ACT was submitted.

## 2022-07-21 NOTE — BH INPATIENT PSYCHIATRY PROGRESS NOTE - NSBHASSESSSUMMFT_PSY_ALL_CORE
Report to [facility].     72 year old  woman, domiciled with daughter, hx of chronic paranoid schizophrenia with multiple prior psychiatric hospitalizations and long standing hx of medication nonadherence, no hx of suicide attempts or suicidality, BIB EMS activated by patient’s daughter when pt was found throwing landlord's tools out of the building, verbally abusive in the context of delusions that landlord was harassing her. Daughter reports patient's behavior has been very concerning due to inability to care for self (severe weight loss) and confrontational behavior that gets her into trouble with people around her. TOO obtained - has been med compliant but with limited impact. Patient remains paranoid, guarded, disorganized, uncooperative with assessments and aftercare planning but has had no outbursts.     Plan:    1) Disposition:   - continue inpatient care for now due to psychosis  - d/c to home on AOT/ACT  2) Legal status: 9.27 - application for discharge denied by courts   3) Psychotropic medications:  - continue risperidone conc 1.5mg po bid - will titrate to effect  - will try to acquire ingrezza for patient's TD though it has been improving with risperidone  - on TOO - zyprexa 5mg IM if patient refuses PO abilify or risperidone  - plan for RAYO  4) Non-pharmacologic interventions:  - individual, group, milieu therapy as appropriate  5) Medical comorbidities:  - no acute needs other than gait instability  6) Work up:  - none  7) Social issues: patient not allowing care coordination with family or to obtain records from other providers but we will need to make contact with daughter to inquire if patient allowed to return (daughter is already aware she is here); will refer to AOT which may allow treatment team obtain records  8) Psychoeducation: Risks and benefits discussed with patient - she has limited understanding and insight

## 2022-07-21 NOTE — BH INPATIENT PSYCHIATRY PROGRESS NOTE - CURRENT MEDICATION
MEDICATIONS  (STANDING):  chlorhexidine 0.12% Liquid 15 milliLiter(s) Swish and Spit two times a day  risperiDONE   Solution 1.5 milliGRAM(s) Oral two times a day    MEDICATIONS  (PRN):  OLANZapine Injectable 5 milliGRAM(s) IntraMuscular at bedtime PRN refusing abilify PO

## 2022-07-21 NOTE — BH INPATIENT PSYCHIATRY PROGRESS NOTE - NSBHFUPINTERVALHXFT_PSY_A_CORE
Chart reviewed and case discussed with treatment team. Staff report patient has been calm and cooperative   Chart reviewed and case discussed with treatment team. Staff report patient has been med compliant though she understands she is under treatment over objection orders. Patient remains paranoid about her landlord. She is guarded on the unit. She continues to deny need for treatment and denies any past treatment despite chart records that describe significant psychosis, poor impulse control and functional impairment.

## 2022-07-21 NOTE — UM REPORT PROGRESS NOTE - NSUMSWACTION_GEN_A_CORE FT
act submitted.    aot applications submitted  by [facility] planning office on 7/20 act submitted.    aot applications submitted  by [facility] planning office on 7/20 and it as been submitted.   pt is focused on discharge and debates need for aftercare.  act submitted 7/14 but continues to be incomplete. cecilio has been addressing this with [facility] planning office.    aot applications submitted  by [facility] planning office on 7/20 and it as been submitted.    cecilio met with pt this week. she continues to deny need for medication but has been more compliant.  RAYO is planned  but pt  might want to decline as she continues to state she has no plans for medication at dc, does not understand/accept plan for ACT/AOT when stabilized.  Pt continues to have paranoid statements about her landlord and does not acknowledge her prior behaviors with him leading to this admission.  Pt would like to return to housing with her daughter but remains upset with daughter due to daughter not agreeing with pt about landlord.    daughter has stated will accept home with act/aot.   record noted daughter had requested act/aot in prior [facility] admissions - not deemed appropriate at the time.  act is now waitlisted.  md continues to adjust medications. reportedly closer to finalizing medication  protocol for dc and to finalize aot treatment plan    dc home to daughter planned.  act is now waitlisted.  AOT has requested more specific dates for prior inpt.  sw called family who promised to provide by 8/21 by did not. this week   cecilio also obtained release of info from pt to [place] and [facility] (needed to have [facility] support signature).  as of today 8 24.  received documentation from [facility] and call out to [facility].  there is now needed documentation . cecilio will update AOT application.    ACT previously submitted.   AOT submitted 8/25 [facility].  [facility] two [facility] followed by inpt in 2021 .     however, MD noted that the ED wanted to dc home but needed family to . They noted 'off meds' and 'psychotic'.  family did not respond then refused to take home , requesting admission, psy  medication and aftercare.  Sarita Hernandez has inquired of [place] AOT if these additional hospital contacts meet criteria to add to this  [facility] admission  . thus over 2 encounters in 2 years.    ACT previously submitted.   AOT submitted 8/25 with info provided from [facility].  [facility] provided two ED visits followed by inpatient stay in 2021.  However, MD noted that the ED wanted to dc home but needed family to . They noted 'off meds' and 'psychotic'.  Her family did not respond and then refused to take her home, requesting admission, for psychiatric medication and aftercare planning.  [facility] criteria to add to this [facility] admission thus patient would have over 2 encounters in 2 years and eligible for AOT.   Currently - pending AOT determination.  ACT cannot be processed quickly without AOT so patient remains on ACT wait list.

## 2022-07-22 PROCEDURE — 99232 SBSQ HOSP IP/OBS MODERATE 35: CPT

## 2022-07-22 PROCEDURE — 90853 GROUP PSYCHOTHERAPY: CPT

## 2022-07-22 RX ADMIN — RISPERIDONE 1.5 MILLIGRAM(S): 4 TABLET ORAL at 08:55

## 2022-07-22 RX ADMIN — CHLORHEXIDINE GLUCONATE 15 MILLILITER(S): 213 SOLUTION TOPICAL at 08:54

## 2022-07-22 RX ADMIN — RISPERIDONE 1.5 MILLIGRAM(S): 4 TABLET ORAL at 20:26

## 2022-07-22 RX ADMIN — CHLORHEXIDINE GLUCONATE 15 MILLILITER(S): 213 SOLUTION TOPICAL at 20:27

## 2022-07-22 NOTE — BH INPATIENT PSYCHIATRY PROGRESS NOTE - MSE UNSTRUCTURED FT
Appearance: has poor oral hygiene; dressed in hospital gown; moderate TD   Behavior: superficially engaged; dominates conversation; no psychomotor agitation or retardation  Speech: poor articulation, soft, difficult to understand yet pressured once she gets irritated and feeling defensive  Mood: denies complaints  Affect: constricted, stable, irritable  Thought process: illogical, disorganized, easily derailed  Thought content: paranoid delusions continue regarding her landlord and in her refusal to share or discuss treatment history, no reports of SI  Perceptual disturbances: no appearance of internal preoccupation  Orientation: unable to ascertain due to poor cooperation  Abstraction: concrete  Fund of knowledge: limited  Insight: poor  Judgement: poor     Appearance: has poor oral hygiene; appears stated age; moderate TD   Behavior: superficially engaged; dominates conversation; no psychomotor agitation or retardation  Speech: articulation remains poor, normal volume, pressured  Mood: denies complaints  Affect: constricted, stable, neutral but becomes irritated easily  Thought process: illogical, disorganized, concrete  Thought content: paranoid delusions continue regarding her landlord and in her refusal to share or discuss treatment history, no reports of SI  Perceptual disturbances: no appearance of internal preoccupation  Orientation: unable to ascertain due to poor cooperation  Abstraction: concrete  Fund of knowledge: limited  Insight: poor  Judgement: poor

## 2022-07-22 NOTE — BH INPATIENT PSYCHIATRY PROGRESS NOTE - NSBHASSESSSUMMFT_PSY_ALL_CORE
72 year old  woman, domiciled with daughter, hx of chronic paranoid schizophrenia with multiple prior psychiatric hospitalizations and long standing hx of medication nonadherence, no hx of suicide attempts or suicidality, BIB EMS activated by patient’s daughter when pt was found throwing landlord's tools out of the building, verbally abusive in the context of delusions that landlord was harassing her. Daughter reports patient's behavior has been very concerning due to inability to care for self (severe weight loss) and confrontational behavior that gets her into trouble with people around her. TOO obtained - has been med compliant but with limited impact. Patient remains paranoid, guarded, disorganized, uncooperative with assessments and aftercare planning but has had no outbursts.     Plan:    1) Disposition:   - continue inpatient care for now due to psychosis  - d/c to home on AOT/ACT  2) Legal status: 9.27 - application for discharge denied by courts   3) Psychotropic medications:  - continue risperidone conc 1.5mg po bid - will titrate to effect  - will try to acquire ingrezza for patient's TD though it has been improving with risperidone  - on TOO - zyprexa 5mg IM if patient refuses PO abilify or risperidone  - plan for RAYO  4) Non-pharmacologic interventions:  - individual, group, milieu therapy as appropriate  5) Medical comorbidities:  - no acute needs other than gait instability  6) Work up:  - none  7) Social issues: patient not allowing care coordination with family or to obtain records from other providers but we will need to make contact with daughter to inquire if patient allowed to return (daughter is already aware she is here); will refer to AOT which may allow treatment team obtain records  8) Psychoeducation: Risks and benefits discussed with patient - she has limited understanding and insight

## 2022-07-22 NOTE — BH INPATIENT PSYCHIATRY PROGRESS NOTE - NSBHCHARTREVIEWVS_PSY_A_CORE FT
Vital Signs Last 24 Hrs  T(C): 37.2 (07-21-22 @ 15:27), Max: 37.2 (07-21-22 @ 15:27)  T(F): 99 (07-21-22 @ 15:27), Max: 99 (07-21-22 @ 15:27)  HR: --  BP: --  BP(mean): --  RR: --  SpO2: --    Orthostatic VS  07-22-22 @ 07:16  Lying BP: --/-- HR: --  Sitting BP: 118/67 HR: 78  Standing BP: --/-- HR: --  Site: --  Mode: --  Orthostatic VS  07-21-22 @ 08:13  Lying BP: --/-- HR: --  Sitting BP: 102/63 HR: 70  Standing BP: 104/66 HR: 79  Site: --  Mode: --   Vital Signs Last 24 Hrs  T(C): 36 (07-22-22 @ 15:37), Max: 36 (07-22-22 @ 15:37)  T(F): 96.8 (07-22-22 @ 15:37), Max: 96.8 (07-22-22 @ 15:37)  HR: --  BP: --  BP(mean): --  RR: --  SpO2: --    Orthostatic VS  07-22-22 @ 07:16  Lying BP: --/-- HR: --  Sitting BP: 118/67 HR: 78  Standing BP: --/-- HR: --  Site: --  Mode: --  Orthostatic VS  07-21-22 @ 08:13  Lying BP: --/-- HR: --  Sitting BP: 102/63 HR: 70  Standing BP: 104/66 HR: 79  Site: --  Mode: --

## 2022-07-22 NOTE — BH INPATIENT PSYCHIATRY PROGRESS NOTE - NSBHFUPINTERVALHXFT_PSY_A_CORE
Chart reviewed and case discussed with treatment team. Staff report patient continues to be guarded and paranoid. She is vigilant even out benign details about herself. Patient remains paranoid about her landlord.

## 2022-07-23 RX ADMIN — RISPERIDONE 1.5 MILLIGRAM(S): 4 TABLET ORAL at 20:10

## 2022-07-23 RX ADMIN — CHLORHEXIDINE GLUCONATE 15 MILLILITER(S): 213 SOLUTION TOPICAL at 20:54

## 2022-07-23 RX ADMIN — CHLORHEXIDINE GLUCONATE 15 MILLILITER(S): 213 SOLUTION TOPICAL at 09:14

## 2022-07-23 RX ADMIN — RISPERIDONE 1.5 MILLIGRAM(S): 4 TABLET ORAL at 09:14

## 2022-07-24 RX ADMIN — CHLORHEXIDINE GLUCONATE 15 MILLILITER(S): 213 SOLUTION TOPICAL at 10:01

## 2022-07-24 RX ADMIN — CHLORHEXIDINE GLUCONATE 15 MILLILITER(S): 213 SOLUTION TOPICAL at 20:22

## 2022-07-24 RX ADMIN — RISPERIDONE 1.5 MILLIGRAM(S): 4 TABLET ORAL at 20:22

## 2022-07-24 RX ADMIN — RISPERIDONE 1.5 MILLIGRAM(S): 4 TABLET ORAL at 10:01

## 2022-07-25 PROCEDURE — 99232 SBSQ HOSP IP/OBS MODERATE 35: CPT

## 2022-07-25 RX ORDER — RISPERIDONE 4 MG/1
2 TABLET ORAL
Refills: 0 | Status: DISCONTINUED | OUTPATIENT
Start: 2022-07-25 | End: 2022-07-28

## 2022-07-25 RX ADMIN — CHLORHEXIDINE GLUCONATE 15 MILLILITER(S): 213 SOLUTION TOPICAL at 09:46

## 2022-07-25 RX ADMIN — RISPERIDONE 2 MILLIGRAM(S): 4 TABLET ORAL at 21:24

## 2022-07-25 RX ADMIN — RISPERIDONE 1.5 MILLIGRAM(S): 4 TABLET ORAL at 09:46

## 2022-07-25 RX ADMIN — CHLORHEXIDINE GLUCONATE 15 MILLILITER(S): 213 SOLUTION TOPICAL at 21:24

## 2022-07-25 NOTE — BH INPATIENT PSYCHIATRY PROGRESS NOTE - NSBHFUPINTERVALHXFT_PSY_A_CORE
Chart reviewed and case discussed with treatment team. Staff report patient remains paranoid and vigilant. She is guarded about even small or benign things. For example, writer requested to examine oral cavity today to check for abnormal involuntary movements of her tongue to monitor side effects. She resisted and later told nurses that writer seems to only be requesting this from her and sounded suspicious about this. Patient continues to be paranoid about landlord. Any mention of subjects she was previously paranoid about, patient tends to become very defensive and preoccupied. She speaks extensively and repetitively to justify her actions and thought process.

## 2022-07-25 NOTE — BH INPATIENT PSYCHIATRY PROGRESS NOTE - NSBHASSESSSUMMFT_PSY_ALL_CORE
72 year old  woman, domiciled with daughter, hx of chronic paranoid schizophrenia with multiple prior psychiatric hospitalizations and long standing hx of medication nonadherence, no hx of suicide attempts or suicidality, BIB EMS activated by patient’s daughter when pt was found throwing landlord's tools out of the building, verbally abusive in the context of delusions that landlord was harassing her. Daughter reports patient's behavior has been very concerning due to inability to care for self (severe weight loss) and confrontational behavior that gets her into trouble with people around her. TOO obtained - has been med compliant but with limited impact. Patient remains paranoid, guarded, disorganized, uncooperative with assessments and aftercare planning but has had no outbursts.     Plan:    1) Disposition:   - continue inpatient care for now due to psychosis  - d/c to home on AOT/ACT  2) Legal status: 9.27 - application for discharge denied by courts   3) Psychotropic medications:  - increase risperidone conc to 2mg po bid - will titrate to effect  - will try to acquire ingrezza for patient's TD though it has been improving with risperidone  - on TOO - zyprexa 5mg IM if patient refuses PO abilify or risperidone  - plan for RAYO  4) Non-pharmacologic interventions:  - individual, group, milieu therapy as appropriate  5) Medical comorbidities:  - no acute needs other than gait instability  6) Work up:  - none  7) Social issues: patient not allowing care coordination with family or to obtain records from other providers but we will need to make contact with daughter to inquire if patient allowed to return (daughter is already aware she is here); will refer to AOT which may allow treatment team obtain records  8) Psychoeducation: Risks and benefits discussed with patient - she has limited understanding and insight

## 2022-07-25 NOTE — BH INPATIENT PSYCHIATRY PROGRESS NOTE - MSE UNSTRUCTURED FT
Appearance: has poor oral hygiene; appears stated age; moderate TD - largely orally, hands b/l and to a smaller degree in her LE (tandem gait impaired)  Behavior: very guarded, poor cooperation, no psychomotor agitation or retardation  Speech: articulation remains poor, normal volume, pressured  Mood: denies complaints  Affect: constricted, stable, less irritable today  Thought process: illogical, concrete, perseverative  Thought content: paranoid delusions continue regarding her landlord and in her refusal to share or discuss treatment history as well as comply with benign assessment, no reports of SI  Perceptual disturbances: no appearance of internal preoccupation  Orientation: unable to ascertain due to poor cooperation  Abstraction: concrete  Fund of knowledge: limited  Insight: poor  Judgement: poor

## 2022-07-25 NOTE — BH INPATIENT PSYCHIATRY PROGRESS NOTE - NSBHCHARTREVIEWVS_PSY_A_CORE FT
Vital Signs Last 24 Hrs  T(C): 36.3 (07-24-22 @ 15:35), Max: 36.3 (07-24-22 @ 15:35)  T(F): 97.4 (07-24-22 @ 15:35), Max: 97.4 (07-24-22 @ 15:35)  HR: --  BP: --  BP(mean): --  RR: --  SpO2: --    Orthostatic VS  07-24-22 @ 08:06  Lying BP: --/-- HR: --  Sitting BP: 104/70 HR: 80  Standing BP: 112/69 HR: 87  Site: upper right arm  Mode: electronic

## 2022-07-26 PROCEDURE — 99232 SBSQ HOSP IP/OBS MODERATE 35: CPT

## 2022-07-26 RX ADMIN — RISPERIDONE 2 MILLIGRAM(S): 4 TABLET ORAL at 20:20

## 2022-07-26 RX ADMIN — CHLORHEXIDINE GLUCONATE 15 MILLILITER(S): 213 SOLUTION TOPICAL at 09:34

## 2022-07-26 RX ADMIN — CHLORHEXIDINE GLUCONATE 15 MILLILITER(S): 213 SOLUTION TOPICAL at 20:20

## 2022-07-26 RX ADMIN — RISPERIDONE 2 MILLIGRAM(S): 4 TABLET ORAL at 09:31

## 2022-07-26 NOTE — BH INPATIENT PSYCHIATRY PROGRESS NOTE - MSE UNSTRUCTURED FT
Appearance: has poor oral hygiene; appears stated age; moderate TD - largely orally, hands b/l and to a smaller degree in her LE (tandem gait impaired)  Behavior: very guarded, poor cooperation, no psychomotor agitation or retardation  Speech: articulation remains poor, normal volume, pressured  Mood: denies complaints  Affect: constricted, stable, less irritable today  Thought process: illogical, concrete, perseverative  Thought content: paranoid delusions continue regarding her landlord and in her refusal to share or discuss treatment history as well as comply with benign assessment, no reports of SI  Perceptual disturbances: no appearance of internal preoccupation  Orientation: unable to ascertain due to poor cooperation  Abstraction: concrete  Fund of knowledge: limited  Insight: poor  Judgement: poor     Appearance: TD of hands, somewhat in LE; appears stated age;  Behavior: no psychomotor agitation or retardation, poorly cooperative  Speech: articulation remains poor, normal volume, pressured  Mood: denies complaints  Affect: irritable, constricted, somewhat labile  Thought process: illogical, concrete, perseverative  Thought content: paranoid delusions continue regarding her landlord and in her refusal to share or discuss treatment history as well as comply with benign assessment, no reports of SI  Perceptual disturbances: no appearance of internal preoccupation  Orientation: unable to ascertain due to poor cooperation  Abstraction: concrete  Fund of knowledge: limited  Insight: poor  Judgement: poor

## 2022-07-26 NOTE — BH INPATIENT PSYCHIATRY PROGRESS NOTE - NSBHCHARTREVIEWVS_PSY_A_CORE FT
Vital Signs Last 24 Hrs  T(C): 37 (07-26-22 @ 16:40), Max: 37 (07-26-22 @ 16:40)  T(F): 98.6 (07-26-22 @ 16:40), Max: 98.6 (07-26-22 @ 16:40)  HR: --  BP: --  BP(mean): --  RR: --  SpO2: --    Orthostatic VS  07-26-22 @ 07:51  Lying BP: --/-- HR: --  Sitting BP: 111/68 HR: 66  Standing BP: 115/68 HR: 81  Site: upper left arm  Mode: electronic

## 2022-07-26 NOTE — BH INPATIENT PSYCHIATRY PROGRESS NOTE - NSBHASSESSSUMMFT_PSY_ALL_CORE
72 year old  woman, domiciled with daughter, hx of chronic paranoid schizophrenia with multiple prior psychiatric hospitalizations and long standing hx of medication nonadherence, no hx of suicide attempts or suicidality, BIB EMS activated by patient’s daughter when pt was found throwing landlord's tools out of the building, verbally abusive in the context of delusions that landlord was harassing her. Daughter reports patient's behavior has been very concerning due to inability to care for self (severe weight loss) and confrontational behavior that gets her into trouble with people around her. TOO obtained - has been med compliant. Patient remains paranoid, guarded, disorganized, uncooperative with assessments and aftercare planning but has had no outbursts.     Plan:    1) Disposition:   - continue inpatient care for now due to psychosis  - d/c to home on AOT/ACT  2) Legal status: 9.27 - application for discharge denied by courts   3) Psychotropic medications:  - continue risperidone conc 2mg po bid - will titrate to effect  - will try to acquire ingrezza for patient's TD though it has been improving with risperidone  - on TOO - zyprexa 5mg IM if patient refuses PO abilify or risperidone  - plan for RAYO  4) Non-pharmacologic interventions:  - individual, group, milieu therapy as appropriate  5) Medical comorbidities:  - no acute needs other than gait instability  6) Work up:  - none  7) Social issues: patient not allowing care coordination with family or to obtain records from other providers but we will need to make contact with daughter to inquire if patient allowed to return (daughter is already aware she is here); will refer to AOT which may allow treatment team obtain records  8) Psychoeducation: Risks and benefits discussed with patient - she has limited understanding and insight

## 2022-07-26 NOTE — BH INPATIENT PSYCHIATRY PROGRESS NOTE - NSBHFUPINTERVALHXFT_PSY_A_CORE
Chart reviewed and case discussed with treatment team. Staff report    Chart reviewed and case discussed with treatment team. Staff report patient has been calm and med compliant. She continues to be paranoid, defensive, concrete and disorganized. Patient makes quite an effort to convince this writer she is healthy, does not need treatment of any kind. Writer attempted to address past treatment to which patient became upset and walked away from the assessment.

## 2022-07-26 NOTE — BH INPATIENT PSYCHIATRY PROGRESS NOTE - CURRENT MEDICATION
MEDICATIONS  (STANDING):  chlorhexidine 0.12% Liquid 15 milliLiter(s) Swish and Spit two times a day  risperiDONE   Solution 2 milliGRAM(s) Oral two times a day    MEDICATIONS  (PRN):  OLANZapine Injectable 5 milliGRAM(s) IntraMuscular at bedtime PRN refusing abilify PO

## 2022-07-27 PROCEDURE — 99232 SBSQ HOSP IP/OBS MODERATE 35: CPT

## 2022-07-27 RX ADMIN — CHLORHEXIDINE GLUCONATE 15 MILLILITER(S): 213 SOLUTION TOPICAL at 20:31

## 2022-07-27 RX ADMIN — RISPERIDONE 2 MILLIGRAM(S): 4 TABLET ORAL at 09:06

## 2022-07-27 RX ADMIN — RISPERIDONE 2 MILLIGRAM(S): 4 TABLET ORAL at 20:32

## 2022-07-27 RX ADMIN — CHLORHEXIDINE GLUCONATE 15 MILLILITER(S): 213 SOLUTION TOPICAL at 11:00

## 2022-07-27 NOTE — BH INPATIENT PSYCHIATRY PROGRESS NOTE - NSBHCHARTREVIEWVS_PSY_A_CORE FT
Vital Signs Last 24 Hrs  T(C): 36.6 (07-27-22 @ 15:54), Max: 37 (07-26-22 @ 16:40)  T(F): 97.8 (07-27-22 @ 15:54), Max: 98.6 (07-26-22 @ 16:40)  HR: --  BP: --  BP(mean): --  RR: --  SpO2: --    Orthostatic VS  07-27-22 @ 08:05  Lying BP: --/-- HR: --  Sitting BP: 115/69 HR: 67  Standing BP: 116/76 HR: 86  Site: --  Mode: --  Orthostatic VS  07-26-22 @ 07:51  Lying BP: --/-- HR: --  Sitting BP: 111/68 HR: 66  Standing BP: 115/68 HR: 81  Site: upper left arm  Mode: electronic   Vital Signs Last 24 Hrs  T(C): 36.6 (07-27-22 @ 15:54), Max: 36.6 (07-27-22 @ 15:54)  T(F): 97.8 (07-27-22 @ 15:54), Max: 97.8 (07-27-22 @ 15:54)  HR: --  BP: --  BP(mean): --  RR: --  SpO2: --    Orthostatic VS  07-27-22 @ 08:05  Lying BP: --/-- HR: --  Sitting BP: 115/69 HR: 67  Standing BP: 116/76 HR: 86  Site: --  Mode: --  Orthostatic VS  07-26-22 @ 07:51  Lying BP: --/-- HR: --  Sitting BP: 111/68 HR: 66  Standing BP: 115/68 HR: 81  Site: upper left arm  Mode: electronic

## 2022-07-27 NOTE — BH INPATIENT PSYCHIATRY PROGRESS NOTE - NSBHFUPINTERVALHXFT_PSY_A_CORE
Chart reviewed and case discussed with treatment team. Staff report    Chart reviewed and case discussed with treatment team. Staff report patient has been compliant with medications. No adverse events occurred. Patient remains paranoid. She was encountered in her room but insisted leaving to go to the day room which was quite noisy. She is disorganized and illogical in her behavior. Very guarded.  Still refusing to discuss care with family and denies she was ever treated for psychiatric conditions.

## 2022-07-27 NOTE — BH INPATIENT PSYCHIATRY PROGRESS NOTE - NSBHASSESSSUMMFT_PSY_ALL_CORE
72 year old  woman, domiciled with daughter, hx of chronic paranoid schizophrenia with multiple prior psychiatric hospitalizations and long standing hx of medication nonadherence, no hx of suicide attempts or suicidality, BIB EMS activated by patient’s daughter when pt was found throwing landlord's tools out of the building, verbally abusive in the context of delusions that landlord was harassing her. Daughter reports patient's behavior has been very concerning due to inability to care for self (severe weight loss) and confrontational behavior that gets her into trouble with people around her. TOO obtained - has been med compliant. Patient remains paranoid, guarded, disorganized, uncooperative with assessments and aftercare planning but has had no outbursts.     Plan:    1) Disposition:   - continue inpatient care for now due to psychosis  - d/c to home on AOT/ACT  2) Legal status: 9.27 - application for discharge denied by courts   3) Psychotropic medications:  - continue risperidone conc 2mg po bid - will titrate to effect  - will likely need augmentation (zyprexa vs clozaril)  - will try to acquire ingrezza for patient's TD though it has been improving with risperidone  - on TOO - zyprexa 5mg IM if patient refuses PO abilify or risperidone  - plan for RAYO  4) Non-pharmacologic interventions:  - individual, group, milieu therapy as appropriate  5) Medical comorbidities:  - no acute needs other than gait instability  6) Work up:  - none  7) Social issues: patient not allowing care coordination with family or to obtain records from other providers but we will need to make contact with daughter to inquire if patient allowed to return (daughter is already aware she is here); will refer to AOT which may allow treatment team obtain records  8) Psychoeducation: Risks and benefits discussed with patient - she has limited understanding and insight

## 2022-07-27 NOTE — BH INPATIENT PSYCHIATRY PROGRESS NOTE - MSE UNSTRUCTURED FT
Appearance: TD of hands, somewhat in LE; appears stated age;  Behavior: no psychomotor agitation or retardation, poorly cooperative  Speech: articulation remains poor, normal volume, pressured  Mood: denies complaints  Affect: irritable, constricted, somewhat labile  Thought process: illogical, concrete, perseverative  Thought content: paranoid delusions continue regarding her landlord and in her refusal to share or discuss treatment history as well as comply with benign assessment, no reports of SI  Perceptual disturbances: no appearance of internal preoccupation  Orientation: unable to ascertain due to poor cooperation  Abstraction: concrete  Fund of knowledge: limited  Insight: poor  Judgement: poor     Appearance: TD of hands, muscles of speech, somewhat in LE; appears stated age; gait more stable but cannot perform tandem gait  Behavior: oddly related, no psychomotor agitation or retardation, poorly cooperative  Speech: articulation remains poor, low volume, pressured  Mood: denies complaints  Affect: irritable, constricted, stable  Thought process: illogical, concrete, has poor reasoning  Thought content: paranoid delusions continue regarding her landlord and in her refusal to share or discuss treatment history as well as comply with benign assessment, no reports of SI  Perceptual disturbances: no appearance of internal preoccupation  Orientation: unable to ascertain due to poor cooperation  Abstraction: concrete  Fund of knowledge: limited  Insight: poor  Judgement: poor

## 2022-07-28 PROCEDURE — 99232 SBSQ HOSP IP/OBS MODERATE 35: CPT

## 2022-07-28 RX ORDER — RISPERIDONE 4 MG/1
2.5 TABLET ORAL
Refills: 0 | Status: DISCONTINUED | OUTPATIENT
Start: 2022-07-28 | End: 2022-08-01

## 2022-07-28 RX ADMIN — RISPERIDONE 2 MILLIGRAM(S): 4 TABLET ORAL at 10:51

## 2022-07-28 RX ADMIN — RISPERIDONE 2.5 MILLIGRAM(S): 4 TABLET ORAL at 21:25

## 2022-07-28 RX ADMIN — CHLORHEXIDINE GLUCONATE 15 MILLILITER(S): 213 SOLUTION TOPICAL at 09:56

## 2022-07-28 RX ADMIN — CHLORHEXIDINE GLUCONATE 15 MILLILITER(S): 213 SOLUTION TOPICAL at 21:25

## 2022-07-28 NOTE — BH INPATIENT PSYCHIATRY PROGRESS NOTE - NSBHFUPINTERVALHXFT_PSY_A_CORE
Chart reviewed and case discussed with treatment team. Staff report    Chart reviewed and case discussed with treatment team. Staff report patient has been calm but guarded and withdrawn. Patient continues to be paranoid about landlord, towards staff regarding past medication trials and treatment. However, she does seem to have more reactive affect. Has been med compliant.

## 2022-07-28 NOTE — BH INPATIENT PSYCHIATRY PROGRESS NOTE - NSBHASSESSSUMMFT_PSY_ALL_CORE
72 year old  woman, domiciled with daughter, hx of chronic paranoid schizophrenia with multiple prior psychiatric hospitalizations and long standing hx of medication nonadherence, no hx of suicide attempts or suicidality, BIB EMS activated by patient’s daughter when pt was found throwing landlord's tools out of the building, verbally abusive in the context of delusions that landlord was harassing her. Daughter reports patient's behavior has been very concerning due to inability to care for self (severe weight loss) and confrontational behavior that gets her into trouble with people around her. TOO obtained - has been med compliant. Patient remains paranoid, guarded, disorganized, uncooperative with assessments and aftercare planning but has had no outbursts.     Plan:    1) Disposition:   - continue inpatient care for now due to psychosis  - d/c to home on AOT/ACT  2) Legal status: 9.27 - application for discharge denied by courts   3) Psychotropic medications:  - increase risperidone conc to 2.5mg po bid - will titrate to effect  - will likely need augmentation (zyprexa vs clozaril)  - will try to acquire ingrezza for patient's TD though it has been improving with risperidone  - on TOO - zyprexa 5mg IM if patient refuses PO abilify or risperidone  - plan for RAYO  4) Non-pharmacologic interventions:  - individual, group, milieu therapy as appropriate  5) Medical comorbidities:  - no acute needs other than gait instability  6) Work up:  - none  7) Social issues: patient not allowing care coordination with family or to obtain records from other providers but we will need to make contact with daughter to inquire if patient allowed to return (daughter is already aware she is here); will refer to AOT which may allow treatment team obtain records  8) Psychoeducation: Risks and benefits discussed with patient - she has limited understanding and insight

## 2022-07-28 NOTE — BH INPATIENT PSYCHIATRY PROGRESS NOTE - NSBHCHARTREVIEWVS_PSY_A_CORE FT
Vital Signs Last 24 Hrs  T(C): 36.2 (07-28-22 @ 05:50), Max: 36.6 (07-27-22 @ 15:54)  T(F): 97.2 (07-28-22 @ 05:50), Max: 97.8 (07-27-22 @ 15:54)  HR: --  BP: --  BP(mean): --  RR: --  SpO2: --    Orthostatic VS  07-28-22 @ 05:50  Lying BP: 99/59 HR: 57  Sitting BP: 90/60 HR: 74  Standing BP: --/-- HR: --  Site: --  Mode: --  Orthostatic VS  07-27-22 @ 08:05  Lying BP: --/-- HR: --  Sitting BP: 115/69 HR: 67  Standing BP: 116/76 HR: 86  Site: --  Mode: --   Vital Signs Last 24 Hrs  T(C): 36.3 (07-28-22 @ 15:39), Max: 36.3 (07-28-22 @ 15:39)  T(F): 97.3 (07-28-22 @ 15:39), Max: 97.3 (07-28-22 @ 15:39)  HR: --  BP: --  BP(mean): --  RR: --  SpO2: --    Orthostatic VS  07-28-22 @ 05:50  Lying BP: 99/59 HR: 57  Sitting BP: 90/60 HR: 74  Standing BP: --/-- HR: --  Site: --  Mode: --  Orthostatic VS  07-27-22 @ 08:05  Lying BP: --/-- HR: --  Sitting BP: 115/69 HR: 67  Standing BP: 116/76 HR: 86  Site: --  Mode: --

## 2022-07-29 PROCEDURE — 99232 SBSQ HOSP IP/OBS MODERATE 35: CPT

## 2022-07-29 RX ADMIN — RISPERIDONE 2.5 MILLIGRAM(S): 4 TABLET ORAL at 21:08

## 2022-07-29 RX ADMIN — CHLORHEXIDINE GLUCONATE 15 MILLILITER(S): 213 SOLUTION TOPICAL at 09:08

## 2022-07-29 RX ADMIN — CHLORHEXIDINE GLUCONATE 15 MILLILITER(S): 213 SOLUTION TOPICAL at 21:05

## 2022-07-29 RX ADMIN — RISPERIDONE 2.5 MILLIGRAM(S): 4 TABLET ORAL at 09:49

## 2022-07-29 NOTE — BH INPATIENT PSYCHIATRY PROGRESS NOTE - NSBHASSESSSUMMFT_PSY_ALL_CORE
72 year old  woman, domiciled with daughter, hx of chronic paranoid schizophrenia with multiple prior psychiatric hospitalizations and long standing hx of medication nonadherence, no hx of suicide attempts or suicidality, BIB EMS activated by patient’s daughter when pt was found throwing landlord's tools out of the building, verbally abusive in the context of delusions that landlord was harassing her. Daughter reports patient's behavior has been very concerning due to inability to care for self (severe weight loss) and confrontational behavior that gets her into trouble with people around her. TOO obtained - has been med compliant. Patient remains paranoid, guarded, disorganized, uncooperative with assessments and aftercare planning but has had no outbursts.     Plan:    1) Disposition:   - continue inpatient care for now due to psychosis  - d/c to home on AOT/ACT  2) Legal status: 9.27 - application for discharge denied by courts   3) Psychotropic medications:  - increase risperidone conc to 2.5mg po bid - will titrate to effect  - will likely need augmentation (zyprexa vs clozaril)  - will try to acquire ingrezza for patient's TD though it has been improving with risperidone  - on TOO - zyprexa 5mg IM if patient refuses PO abilify or risperidone  - plan for RAYO  4) Non-pharmacologic interventions:  - individual, group, milieu therapy as appropriate  5) Medical comorbidities:  - no acute needs other than gait instability  6) Work up:  - none  7) Social issues: patient not allowing care coordination with family or to obtain records from other providers but we will need to make contact with daughter to inquire if patient allowed to return (daughter is already aware she is here); will refer to AOT which may allow treatment team obtain records  8) Psychoeducation: Risks and benefits discussed with patient - she has limited understanding and insight    72 year old  woman, domiciled with daughter, hx of chronic paranoid schizophrenia with multiple prior psychiatric hospitalizations and long standing hx of medication nonadherence, no hx of suicide attempts or suicidality, BIB EMS activated by patient’s daughter when pt was found throwing landlord's tools out of the building, verbally abusive in the context of delusions that landlord was harassing her. Daughter reports patient's behavior has been very concerning due to inability to care for self (severe weight loss) and confrontational behavior that gets her into trouble with people around her. TOO obtained - has been med compliant. Patient remains paranoid, guarded, disorganized, uncooperative with assessments and aftercare planning but has had no outbursts.     Plan:    1) Disposition:   - continue inpatient care for now due to psychosis  - d/c to home on AOT/ACT  2) Legal status: 9.27 - application for discharge denied by courts   3) Psychotropic medications:  - continue risperidone conc 2.5mg po bid - will titrate to effect  - will likely need augmentation (zyprexa vs clozaril)  - will try to acquire ingrezza for patient's TD though it has been improving with risperidone  - on TOO - zyprexa 5mg IM if patient refuses PO abilify or risperidone  - plan for RAYO  4) Non-pharmacologic interventions:  - individual, group, milieu therapy as appropriate  5) Medical comorbidities:  - no acute needs other than gait instability  6) Work up:  - none  7) Social issues: patient not allowing care coordination with family or to obtain records from other providers but we will need to make contact with daughter to inquire if patient allowed to return (daughter is already aware she is here); will refer to AOT which may allow treatment team obtain records  8) Psychoeducation: Risks and benefits discussed with patient - she has limited understanding and insight

## 2022-07-29 NOTE — BH INPATIENT PSYCHIATRY PROGRESS NOTE - NSBHCHARTREVIEWVS_PSY_A_CORE FT
Vital Signs Last 24 Hrs  T(C): 36.9 (07-29-22 @ 15:36), Max: 36.9 (07-29-22 @ 15:36)  T(F): 98.5 (07-29-22 @ 15:36), Max: 98.5 (07-29-22 @ 15:36)  HR: --  BP: --  BP(mean): --  RR: 17 (07-29-22 @ 05:21) (17 - 17)  SpO2: --    Orthostatic VS  07-29-22 @ 05:21  Lying BP: 104/63 HR: 62  Sitting BP: 117/58 HR: 76  Standing BP: --/-- HR: --  Site: upper right arm  Mode: electronic  Orthostatic VS  07-28-22 @ 05:50  Lying BP: 99/59 HR: 57  Sitting BP: 90/60 HR: 74  Standing BP: --/-- HR: --  Site: --  Mode: --

## 2022-07-29 NOTE — BH INPATIENT PSYCHIATRY PROGRESS NOTE - NSBHFUPINTERVALHXFT_PSY_A_CORE
Chart reviewed and case discussed with treatment team. Staff report    Chart reviewed and case discussed with treatment team. Staff report patient is superficially calm and cooperative but paranoid behavior is quite obvious. She is only willing to be assessed by this writer in the common area. She is defensive and makes a lot of effort to convince this writer "there is nothing wrong with me." Writer shared observations about her paranoid behavior to which she became irritated and again started to perseverate and have pressured speech.

## 2022-07-29 NOTE — BH INPATIENT PSYCHIATRY PROGRESS NOTE - CURRENT MEDICATION
MEDICATIONS  (STANDING):  chlorhexidine 0.12% Liquid 15 milliLiter(s) Swish and Spit two times a day  risperiDONE   Solution 2.5 milliGRAM(s) Oral two times a day    MEDICATIONS  (PRN):  OLANZapine Injectable 5 milliGRAM(s) IntraMuscular at bedtime PRN refusing abilify PO

## 2022-07-30 RX ADMIN — CHLORHEXIDINE GLUCONATE 15 MILLILITER(S): 213 SOLUTION TOPICAL at 09:17

## 2022-07-30 RX ADMIN — CHLORHEXIDINE GLUCONATE 15 MILLILITER(S): 213 SOLUTION TOPICAL at 20:39

## 2022-07-30 RX ADMIN — RISPERIDONE 2.5 MILLIGRAM(S): 4 TABLET ORAL at 20:39

## 2022-07-30 RX ADMIN — RISPERIDONE 2.5 MILLIGRAM(S): 4 TABLET ORAL at 09:17

## 2022-07-31 RX ADMIN — RISPERIDONE 2.5 MILLIGRAM(S): 4 TABLET ORAL at 20:21

## 2022-07-31 RX ADMIN — RISPERIDONE 2.5 MILLIGRAM(S): 4 TABLET ORAL at 09:18

## 2022-07-31 RX ADMIN — CHLORHEXIDINE GLUCONATE 15 MILLILITER(S): 213 SOLUTION TOPICAL at 20:22

## 2022-07-31 RX ADMIN — CHLORHEXIDINE GLUCONATE 15 MILLILITER(S): 213 SOLUTION TOPICAL at 09:18

## 2022-08-01 PROCEDURE — 99232 SBSQ HOSP IP/OBS MODERATE 35: CPT

## 2022-08-01 RX ORDER — RISPERIDONE 4 MG/1
3 TABLET ORAL
Refills: 0 | Status: DISCONTINUED | OUTPATIENT
Start: 2022-08-01 | End: 2022-08-04

## 2022-08-01 RX ADMIN — CHLORHEXIDINE GLUCONATE 15 MILLILITER(S): 213 SOLUTION TOPICAL at 10:10

## 2022-08-01 RX ADMIN — RISPERIDONE 2.5 MILLIGRAM(S): 4 TABLET ORAL at 10:10

## 2022-08-01 RX ADMIN — RISPERIDONE 3 MILLIGRAM(S): 4 TABLET ORAL at 20:17

## 2022-08-01 RX ADMIN — CHLORHEXIDINE GLUCONATE 15 MILLILITER(S): 213 SOLUTION TOPICAL at 20:16

## 2022-08-01 NOTE — BH INPATIENT PSYCHIATRY PROGRESS NOTE - MSE UNSTRUCTURED FT
Appearance: appears stated age; thin; TD of LE, hands, oral muscles; poor dentition but better oral hygiene  Behavior: remains guarded, defensive, rigit; was sleeping in chair on approach  Speech: articulation remains poor, more loud and pressured when irritated or defensive  Mood: denies complaints despite obvious dissatisfaction with being here against her wishes  Affect: irritable, constricted, stable  Thought process: illogical, concrete, disorganized  Thought content: paranoid delusions continue, about her landlord, about this writer and at times other staff, no reports of SI  Perceptual disturbances: no appearance of internal preoccupation  Orientation: unable to ascertain due to poor cooperation; no obvious deficits in recall  Abstraction: concrete  Fund of knowledge: limited  Insight: poor  Judgement: poor

## 2022-08-01 NOTE — BH INPATIENT PSYCHIATRY PROGRESS NOTE - NSBHCHARTREVIEWVS_PSY_A_CORE FT
Vital Signs Last 24 Hrs  T(C): 36.2 (08-01-22 @ 07:45), Max: 36.2 (07-31-22 @ 15:20)  T(F): 97.1 (08-01-22 @ 07:45), Max: 97.2 (07-31-22 @ 15:20)  HR: --  BP: --  BP(mean): --  RR: --  SpO2: --    Orthostatic VS  08-01-22 @ 07:45  Lying BP: --/-- HR: --  Sitting BP: 126/-- HR: 101  Standing BP: --/-- HR: --  Site: --  Mode: --  Orthostatic VS  07-31-22 @ 07:51  Lying BP: --/-- HR: --  Sitting BP: 112/71 HR: 72  Standing BP: 118/71 HR: 83  Site: upper right arm  Mode: electronic

## 2022-08-01 NOTE — BH INPATIENT PSYCHIATRY PROGRESS NOTE - NSBHMETABOLIC_PSY_ALL_CORE_FT
BMI: BMI (kg/m2): 20.4 (07-30-22 @ 15:41)  HbA1c: A1C with Estimated Average Glucose Result: 5.9 % (06-19-22 @ 08:45)    Glucose:   BP: --  Lipid Panel: Date/Time: 06-19-22 @ 08:45  Cholesterol, Serum: 165  Direct LDL: --  HDL Cholesterol, Serum: 64  Total Cholesterol/HDL Ration Measurement: --  Triglycerides, Serum: 58

## 2022-08-01 NOTE — BH INPATIENT PSYCHIATRY PROGRESS NOTE - NSBHFUPINTERVALHXFT_PSY_A_CORE
Chart reviewed and case discussed with treatment team. Staff report patient has not had any agitation or outbursts. She remains guarded, is rigid and defensive. She refuses to speak with this writer in private and insists on talking in the day room despite the loud noise. Writer pointed out that this behavior may be construed as paranoid to which she became defensive. Writer also observed that she was falling asleep in the day room when writer approached. She denies this. Denies having any drowsiness as a result of medication side effects. She remains focused on discharge. Denies depressed mood or suicidal thoughts. Her paranoid beliefs about her landlord remains. She maintains that "there's nothing wrong with me."

## 2022-08-01 NOTE — BH INPATIENT PSYCHIATRY PROGRESS NOTE - NSBHASSESSSUMMFT_PSY_ALL_CORE
72 year old  woman, domiciled with daughter, hx of chronic paranoid schizophrenia with multiple prior psychiatric hospitalizations and long standing hx of medication nonadherence, no hx of suicide attempts or suicidality, BIB EMS activated by patient’s daughter when pt was found throwing landlord's tools out of the building, verbally abusive in the context of delusions that landlord was harassing her. Daughter reports patient's behavior has been very concerning due to inability to care for self (severe weight loss) and confrontational behavior that gets her into trouble with people around her. TOO obtained - has been med compliant. Patient remains paranoid, guarded, disorganized, uncooperative with assessments and aftercare planning but has had no outbursts.     Plan:    1) Disposition:   - continue inpatient care for now due to psychosis  - d/c to home on AOT/ACT  2) Legal status: 9.27 - application for discharge denied by courts   3) Psychotropic medications:  - increase risperidone conc to 3mg po bid - will titrate to effect  - will likely need augmentation with zyprexa (will not be compliant with clozaril bloodwork)  - will try to acquire ingrezza for patient's TD though it has been improving since admission  - on TOO - zyprexa 5mg IM if patient refuses PO abilify or risperidone  - plan for RAYO  4) Non-pharmacologic interventions:  - individual, group, milieu therapy as appropriate  5) Medical comorbidities:  - no acute needs other than gait instability  6) Work up:  - none  7) Social issues: patient not allowing care coordination with family or to obtain records from other providers; will refer to AOT which may allow treatment team obtain records  8) Psychoeducation: Risks and benefits discussed with patient - she has limited understanding and insight

## 2022-08-02 PROCEDURE — 99232 SBSQ HOSP IP/OBS MODERATE 35: CPT

## 2022-08-02 RX ADMIN — RISPERIDONE 3 MILLIGRAM(S): 4 TABLET ORAL at 20:41

## 2022-08-02 RX ADMIN — RISPERIDONE 3 MILLIGRAM(S): 4 TABLET ORAL at 11:15

## 2022-08-02 RX ADMIN — CHLORHEXIDINE GLUCONATE 15 MILLILITER(S): 213 SOLUTION TOPICAL at 11:17

## 2022-08-02 RX ADMIN — CHLORHEXIDINE GLUCONATE 15 MILLILITER(S): 213 SOLUTION TOPICAL at 20:41

## 2022-08-02 NOTE — BH INPATIENT PSYCHIATRY PROGRESS NOTE - NSBHASSESSSUMMFT_PSY_ALL_CORE
72 year old  woman, domiciled with daughter, hx of chronic paranoid schizophrenia with multiple prior psychiatric hospitalizations and long standing hx of medication nonadherence, no hx of suicide attempts or suicidality, BIB EMS activated by patient’s daughter when pt was found throwing landlord's tools out of the building, verbally abusive in the context of delusions that landlord was harassing her. Daughter reports patient's behavior has been very concerning due to inability to care for self (severe weight loss) and confrontational behavior that gets her into trouble with people around her. TOO obtained - has been med compliant. Patient remains paranoid, guarded, disorganized, uncooperative with assessments and aftercare planning but has had no outbursts.     Plan:    1) Disposition:   - continue inpatient care for now due to psychosis  - d/c to home on AOT/ACT  2) Legal status: 9.27 - application for discharge denied by courts   3) Psychotropic medications:  - continue risperidone conc 3mg po bid - will transition to invega sustenna tomorrow  - will likely need augmentation with zyprexa (will not be compliant with clozaril bloodwork)  - will try to acquire ingrezza for patient's TD though it has been improving since admission  - on TOO - zyprexa 5mg IM if patient refuses PO abilify or risperidone  - plan for RAYO  4) Non-pharmacologic interventions:  - individual, group, milieu therapy as appropriate  5) Medical comorbidities:  - no acute needs other than gait instability  6) Work up:  - none  7) Social issues: patient not allowing care coordination with family or to obtain records from other providers; will refer to AOT which may allow treatment team obtain records  8) Psychoeducation: Risks and benefits discussed with patient - she has limited understanding and insight

## 2022-08-02 NOTE — BH INPATIENT PSYCHIATRY PROGRESS NOTE - NSBHFUPINTERVALHXFT_PSY_A_CORE
Chart reviewed and case discussed with treatment team. Staff report patient has been med compliant with court ordered treatment. She did participate in a [employer] game with activity therapy groups today. Patient continues to spend time trying to convince this writer she is doing well, that she is taking medications as prescribed but still declining to acknowledge psychiatric treatment history, does not provide consent to involve family or get previous records, and maintains her landlord was harassing her.

## 2022-08-02 NOTE — BH INPATIENT PSYCHIATRY PROGRESS NOTE - NSBHCHARTREVIEWVS_PSY_A_CORE FT
Vital Signs Last 24 Hrs  T(C): 36.3 (08-02-22 @ 15:31), Max: 36.3 (08-02-22 @ 15:31)  T(F): 97.3 (08-02-22 @ 15:31), Max: 97.3 (08-02-22 @ 15:31)  HR: --  BP: --  BP(mean): --  RR: --  SpO2: --    Orthostatic VS  08-01-22 @ 07:45  Lying BP: --/-- HR: --  Sitting BP: 126/-- HR: 101  Standing BP: --/-- HR: --  Site: --  Mode: --

## 2022-08-02 NOTE — BH INPATIENT PSYCHIATRY PROGRESS NOTE - CURRENT MEDICATION
MEDICATIONS  (STANDING):  chlorhexidine 0.12% Liquid 15 milliLiter(s) Swish and Spit two times a day  risperiDONE   Solution 3 milliGRAM(s) Oral two times a day    MEDICATIONS  (PRN):  OLANZapine Injectable 5 milliGRAM(s) IntraMuscular at bedtime PRN refusing abilify PO

## 2022-08-03 PROCEDURE — 99232 SBSQ HOSP IP/OBS MODERATE 35: CPT

## 2022-08-03 RX ADMIN — CHLORHEXIDINE GLUCONATE 15 MILLILITER(S): 213 SOLUTION TOPICAL at 09:58

## 2022-08-03 RX ADMIN — RISPERIDONE 3 MILLIGRAM(S): 4 TABLET ORAL at 09:58

## 2022-08-03 RX ADMIN — CHLORHEXIDINE GLUCONATE 15 MILLILITER(S): 213 SOLUTION TOPICAL at 20:25

## 2022-08-03 RX ADMIN — RISPERIDONE 3 MILLIGRAM(S): 4 TABLET ORAL at 20:24

## 2022-08-03 NOTE — BH INPATIENT PSYCHIATRY PROGRESS NOTE - MSE UNSTRUCTURED FT
Appearance: thin habitus, appears stated age; TD of LE, hands, oral muscles - affects gait but improved  Behavior: remains guarded, defensive, superficial  Speech: articulation remains poor due to poor dentition and TD; otherwise wnl; tends to talk over this writer at times  Mood: denies complaints   Affect: irritable - superficially and incongruent to subjective report, constricted, stable  Thought process: illogical, concrete, disorganized  Thought content: paranoid delusions continue, no reports of SI  Perceptual disturbances: no appearance of internal preoccupation  Orientation: unable to ascertain due to poor cooperation; no obvious deficits in recall  Abstraction: concrete  Fund of knowledge: limited  Insight: poor  Judgement: poor     Appearance: thin habitus, appears stated age; TD of LE, hands, oral muscles - affects gait but improved  Behavior: superficially cooperative, guarded, good eye contact  Speech: articulation remains poor due to poor dentition and TD; otherwise wnl; tends to talk over this writer at times  Mood: denies complaints   Affect: neutral, constricted, stable  Thought process: illogical, concrete, disorganized  Thought content: paranoid delusions continue though less defensive today, no reports of SI  Perceptual disturbances: no appearance of internal preoccupation  Orientation: unable to ascertain due to poor cooperation; no obvious deficits in recall  Abstraction: concrete  Fund of knowledge: limited  Insight: poor  Judgement: poor

## 2022-08-03 NOTE — BH INPATIENT PSYCHIATRY PROGRESS NOTE - NSBHCHARTREVIEWVS_PSY_A_CORE FT
Vital Signs Last 24 Hrs  T(C): 36.3 (08-03-22 @ 15:26), Max: 36.3 (08-03-22 @ 15:26)  T(F): 97.4 (08-03-22 @ 15:26), Max: 97.4 (08-03-22 @ 15:26)  HR: 63 (08-03-22 @ 07:52) (63 - 63)  BP: 105/62 (08-03-22 @ 07:52) (105/62 - 105/62)  BP(mean): --  RR: --  SpO2: --

## 2022-08-03 NOTE — BH INPATIENT PSYCHIATRY PROGRESS NOTE - NSBHASSESSSUMMFT_PSY_ALL_CORE
72 year old  woman, domiciled with daughter, hx of chronic paranoid schizophrenia with multiple prior psychiatric hospitalizations and long standing hx of medication nonadherence, no hx of suicide attempts or suicidality, BIB EMS activated by patient’s daughter when pt was found throwing landlord's tools out of the building, verbally abusive in the context of delusions that landlord was harassing her. Daughter reports patient's behavior has been very concerning due to inability to care for self (severe weight loss) and confrontational behavior that gets her into trouble with people around her. TOO obtained - has been med compliant. Patient remains paranoid, guarded, disorganized, uncooperative with assessments and aftercare planning but has had no outbursts.     Plan:    1) Disposition:   - continue inpatient care for now due to psychosis  - d/c to home on AOT/ACT  2) Legal status: 9.27 - application for discharge denied by courts   3) Psychotropic medications:  - continue risperidone conc 3mg po bid - will transition to invega sustenna tomorrow  - will likely need augmentation with zyprexa (will not be compliant with clozaril bloodwork)  - will try to acquire ingrezza for patient's TD though it has been improving since admission  - on TOO - zyprexa 5mg IM if patient refuses PO abilify or risperidone  - plan for RAYO  4) Non-pharmacologic interventions:  - individual, group, milieu therapy as appropriate  5) Medical comorbidities:  - no acute needs other than gait instability  6) Work up:  - none  7) Social issues: patient not allowing care coordination with family or to obtain records from other providers; will refer to AOT which may allow treatment team obtain records  8) Psychoeducation: Risks and benefits discussed with patient - she has limited understanding and insight    72 year old  woman, domiciled with daughter, hx of chronic paranoid schizophrenia with multiple prior psychiatric hospitalizations and long standing hx of medication nonadherence, no hx of suicide attempts or suicidality, BIB EMS activated by patient’s daughter when pt was found throwing landlord's tools out of the building, verbally abusive in the context of delusions that landlord was harassing her. Daughter reports patient's behavior has been very concerning due to inability to care for self (severe weight loss) and confrontational behavior that gets her into trouble with people around her. TOO obtained - has been med compliant. Patient remains paranoid, guarded, disorganized, uncooperative with assessments and aftercare planning but has had no outbursts.     Plan:    1) Disposition:   - continue inpatient care for now due to psychosis  - d/c to home on AOT/ACT  2) Legal status: 9.27 - application for discharge denied by courts   3) Psychotropic medications:  - continue risperidone conc 3mg po bid - will transition to invega sustenna tomorrow (234mg IM to start and followed by 156mg IM)  - will likely need augmentation with zyprexa (will not be compliant with clozaril bloodwork)  - will try to acquire ingrezza for patient's TD though it has been improving since admission  - on TOO - zyprexa 5mg IM if patient refuses PO abilify or risperidone  - plan for RAYO  4) Non-pharmacologic interventions:  - individual, group, milieu therapy as appropriate  5) Medical comorbidities:  - no acute needs other than gait instability  6) Work up:  - none  7) Social issues: patient not allowing care coordination with family or to obtain records from other providers; will refer to AOT which may allow treatment team obtain records  8) Psychoeducation: Risks and benefits discussed with patient - she has limited understanding and insight

## 2022-08-03 NOTE — BH INPATIENT PSYCHIATRY PROGRESS NOTE - NSBHFUPINTERVALHXFT_PSY_A_CORE
Chart reviewed and case discussed with treatment team. Staff report    Chart reviewed and case discussed with treatment team. Staff report patient continues to be quiet, isolative. She declines assistance with ADLs. Patient continues to state she won't be seeking outpatient care. She remains paranoid.

## 2022-08-03 NOTE — BH INPATIENT PSYCHIATRY PROGRESS NOTE - NSBHMETABOLIC_PSY_ALL_CORE_FT
BMI: BMI (kg/m2): 20.4 (07-30-22 @ 15:41)  HbA1c: A1C with Estimated Average Glucose Result: 5.9 % (06-19-22 @ 08:45)    Glucose:   BP: 105/62 (08-03-22 @ 07:52) (105/62 - 105/62)  Lipid Panel: Date/Time: 06-19-22 @ 08:45  Cholesterol, Serum: 165  Direct LDL: --  HDL Cholesterol, Serum: 64  Total Cholesterol/HDL Ration Measurement: --  Triglycerides, Serum: 58

## 2022-08-04 LAB
ALBUMIN SERPL ELPH-MCNC: 3.9 G/DL — SIGNIFICANT CHANGE UP (ref 3.3–5)
ALP SERPL-CCNC: 84 U/L — SIGNIFICANT CHANGE UP (ref 40–120)
ALT FLD-CCNC: 21 U/L — SIGNIFICANT CHANGE UP (ref 4–33)
ANION GAP SERPL CALC-SCNC: 12 MMOL/L — SIGNIFICANT CHANGE UP (ref 7–14)
AST SERPL-CCNC: 22 U/L — SIGNIFICANT CHANGE UP (ref 4–32)
BASOPHILS # BLD AUTO: 0.05 K/UL — SIGNIFICANT CHANGE UP (ref 0–0.2)
BASOPHILS NFR BLD AUTO: 1.5 % — SIGNIFICANT CHANGE UP (ref 0–2)
BILIRUB SERPL-MCNC: 0.4 MG/DL — SIGNIFICANT CHANGE UP (ref 0.2–1.2)
BUN SERPL-MCNC: 19 MG/DL — SIGNIFICANT CHANGE UP (ref 7–23)
CALCIUM SERPL-MCNC: 9.9 MG/DL — SIGNIFICANT CHANGE UP (ref 8.4–10.5)
CHLORIDE SERPL-SCNC: 102 MMOL/L — SIGNIFICANT CHANGE UP (ref 98–107)
CO2 SERPL-SCNC: 24 MMOL/L — SIGNIFICANT CHANGE UP (ref 22–31)
CREAT SERPL-MCNC: 0.94 MG/DL — SIGNIFICANT CHANGE UP (ref 0.5–1.3)
EGFR: 65 ML/MIN/1.73M2 — SIGNIFICANT CHANGE UP
EOSINOPHIL # BLD AUTO: 0.05 K/UL — SIGNIFICANT CHANGE UP (ref 0–0.5)
EOSINOPHIL NFR BLD AUTO: 1.5 % — SIGNIFICANT CHANGE UP (ref 0–6)
GLUCOSE SERPL-MCNC: 116 MG/DL — HIGH (ref 70–99)
HCT VFR BLD CALC: 39.3 % — SIGNIFICANT CHANGE UP (ref 34.5–45)
HGB BLD-MCNC: 12.1 G/DL — SIGNIFICANT CHANGE UP (ref 11.5–15.5)
IANC: 1.69 K/UL — LOW (ref 1.8–7.4)
IMM GRANULOCYTES NFR BLD AUTO: 0.3 % — SIGNIFICANT CHANGE UP (ref 0–1.5)
LYMPHOCYTES # BLD AUTO: 1.22 K/UL — SIGNIFICANT CHANGE UP (ref 1–3.3)
LYMPHOCYTES # BLD AUTO: 37.1 % — SIGNIFICANT CHANGE UP (ref 13–44)
MCHC RBC-ENTMCNC: 28.5 PG — SIGNIFICANT CHANGE UP (ref 27–34)
MCHC RBC-ENTMCNC: 30.8 GM/DL — LOW (ref 32–36)
MCV RBC AUTO: 92.5 FL — SIGNIFICANT CHANGE UP (ref 80–100)
MONOCYTES # BLD AUTO: 0.27 K/UL — SIGNIFICANT CHANGE UP (ref 0–0.9)
MONOCYTES NFR BLD AUTO: 8.2 % — SIGNIFICANT CHANGE UP (ref 2–14)
NEUTROPHILS # BLD AUTO: 1.69 K/UL — LOW (ref 1.8–7.4)
NEUTROPHILS NFR BLD AUTO: 51.4 % — SIGNIFICANT CHANGE UP (ref 43–77)
NRBC # BLD: 0 /100 WBCS — SIGNIFICANT CHANGE UP
NRBC # FLD: 0 K/UL — SIGNIFICANT CHANGE UP
PLATELET # BLD AUTO: 234 K/UL — SIGNIFICANT CHANGE UP (ref 150–400)
POTASSIUM SERPL-MCNC: 4.1 MMOL/L — SIGNIFICANT CHANGE UP (ref 3.5–5.3)
POTASSIUM SERPL-SCNC: 4.1 MMOL/L — SIGNIFICANT CHANGE UP (ref 3.5–5.3)
PROT SERPL-MCNC: 7.4 G/DL — SIGNIFICANT CHANGE UP (ref 6–8.3)
RBC # BLD: 4.25 M/UL — SIGNIFICANT CHANGE UP (ref 3.8–5.2)
RBC # FLD: 13.6 % — SIGNIFICANT CHANGE UP (ref 10.3–14.5)
SODIUM SERPL-SCNC: 138 MMOL/L — SIGNIFICANT CHANGE UP (ref 135–145)
WBC # BLD: 3.29 K/UL — LOW (ref 3.8–10.5)
WBC # FLD AUTO: 3.29 K/UL — LOW (ref 3.8–10.5)

## 2022-08-04 PROCEDURE — 99232 SBSQ HOSP IP/OBS MODERATE 35: CPT

## 2022-08-04 PROCEDURE — 93010 ELECTROCARDIOGRAM REPORT: CPT

## 2022-08-04 RX ORDER — ARIPIPRAZOLE 15 MG/1
5 TABLET ORAL DAILY
Refills: 0 | Status: DISCONTINUED | OUTPATIENT
Start: 2022-08-05 | End: 2022-08-05

## 2022-08-04 RX ORDER — HALOPERIDOL DECANOATE 100 MG/ML
5 INJECTION INTRAMUSCULAR
Refills: 0 | Status: DISCONTINUED | OUTPATIENT
Start: 2022-08-04 | End: 2022-08-05

## 2022-08-04 RX ORDER — RISPERIDONE 4 MG/1
2 TABLET ORAL
Refills: 0 | Status: DISCONTINUED | OUTPATIENT
Start: 2022-08-04 | End: 2022-08-05

## 2022-08-04 RX ADMIN — HALOPERIDOL DECANOATE 5 MILLIGRAM(S): 100 INJECTION INTRAMUSCULAR at 20:32

## 2022-08-04 RX ADMIN — CHLORHEXIDINE GLUCONATE 15 MILLILITER(S): 213 SOLUTION TOPICAL at 10:02

## 2022-08-04 RX ADMIN — CHLORHEXIDINE GLUCONATE 15 MILLILITER(S): 213 SOLUTION TOPICAL at 20:31

## 2022-08-04 RX ADMIN — RISPERIDONE 3 MILLIGRAM(S): 4 TABLET ORAL at 10:02

## 2022-08-04 RX ADMIN — RISPERIDONE 2 MILLIGRAM(S): 4 TABLET ORAL at 20:30

## 2022-08-04 NOTE — BH INPATIENT PSYCHIATRY PROGRESS NOTE - MSE UNSTRUCTURED FT
Appearance: TD of hands, truncal muscles, affecting gait but less unstable than initial presentation; no overt deficits in hygiene; has poor dentition  Behavior: superficially cooperative, remains guarded, attentive  Speech: articulation remains poor due to poor dentition and TD; she is also soft spoken - difficult to hear her but she refuses to speak privately  Mood: denies complaints   Affect: neutral, constricted, stable  Thought process: illogical, concrete, disorganized  Thought content: paranoid delusions continue, no reports of SI  Perceptual disturbances: no appearance of internal preoccupation  Orientation: unable to ascertain due to poor cooperation; no obvious deficits in recall  Abstraction: concrete  Fund of knowledge: limited  Insight: poor  Judgement: poor

## 2022-08-04 NOTE — BH INPATIENT PSYCHIATRY PROGRESS NOTE - NSBHCHARTREVIEWVS_PSY_A_CORE FT
Vital Signs Last 24 Hrs  T(C): 36.4 (08-04-22 @ 05:53), Max: 36.4 (08-04-22 @ 05:53)  T(F): 97.5 (08-04-22 @ 05:53), Max: 97.5 (08-04-22 @ 05:53)  HR: --  BP: --  BP(mean): --  RR: --  SpO2: --    Orthostatic VS  08-04-22 @ 05:53  Lying BP: 109/65 HR: 61  Sitting BP: 101/74 HR: 67  Standing BP: --/-- HR: --  Site: --  Mode: --
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
## 2022-08-05 LAB
BASOPHILS # BLD AUTO: 0.05 K/UL — SIGNIFICANT CHANGE UP (ref 0–0.2)
BASOPHILS NFR BLD AUTO: 1.4 % — SIGNIFICANT CHANGE UP (ref 0–2)
EOSINOPHIL # BLD AUTO: 0.05 K/UL — SIGNIFICANT CHANGE UP (ref 0–0.5)
EOSINOPHIL NFR BLD AUTO: 1.4 % — SIGNIFICANT CHANGE UP (ref 0–6)
HCT VFR BLD CALC: 39.8 % — SIGNIFICANT CHANGE UP (ref 34.5–45)
HGB BLD-MCNC: 12 G/DL — SIGNIFICANT CHANGE UP (ref 11.5–15.5)
IANC: 1.88 K/UL — SIGNIFICANT CHANGE UP (ref 1.8–7.4)
IMM GRANULOCYTES NFR BLD AUTO: 0 % — SIGNIFICANT CHANGE UP (ref 0–1.5)
LYMPHOCYTES # BLD AUTO: 1.26 K/UL — SIGNIFICANT CHANGE UP (ref 1–3.3)
LYMPHOCYTES # BLD AUTO: 35.3 % — SIGNIFICANT CHANGE UP (ref 13–44)
MCHC RBC-ENTMCNC: 28 PG — SIGNIFICANT CHANGE UP (ref 27–34)
MCHC RBC-ENTMCNC: 30.2 GM/DL — LOW (ref 32–36)
MCV RBC AUTO: 93 FL — SIGNIFICANT CHANGE UP (ref 80–100)
MONOCYTES # BLD AUTO: 0.33 K/UL — SIGNIFICANT CHANGE UP (ref 0–0.9)
MONOCYTES NFR BLD AUTO: 9.2 % — SIGNIFICANT CHANGE UP (ref 2–14)
NEUTROPHILS # BLD AUTO: 1.88 K/UL — SIGNIFICANT CHANGE UP (ref 1.8–7.4)
NEUTROPHILS NFR BLD AUTO: 52.7 % — SIGNIFICANT CHANGE UP (ref 43–77)
NRBC # BLD: 0 /100 WBCS — SIGNIFICANT CHANGE UP
NRBC # FLD: 0 K/UL — SIGNIFICANT CHANGE UP
PLATELET # BLD AUTO: 251 K/UL — SIGNIFICANT CHANGE UP (ref 150–400)
RBC # BLD: 4.28 M/UL — SIGNIFICANT CHANGE UP (ref 3.8–5.2)
RBC # FLD: 13.8 % — SIGNIFICANT CHANGE UP (ref 10.3–14.5)
WBC # BLD: 3.57 K/UL — LOW (ref 3.8–10.5)
WBC # FLD AUTO: 3.57 K/UL — LOW (ref 3.8–10.5)

## 2022-08-05 RX ORDER — HALOPERIDOL DECANOATE 100 MG/ML
5 INJECTION INTRAMUSCULAR
Refills: 0 | Status: DISCONTINUED | OUTPATIENT
Start: 2022-08-05 | End: 2022-09-19

## 2022-08-05 RX ORDER — RISPERIDONE 4 MG/1
1 TABLET ORAL
Refills: 0 | Status: DISCONTINUED | OUTPATIENT
Start: 2022-08-05 | End: 2022-08-08

## 2022-08-05 RX ORDER — HALOPERIDOL DECANOATE 100 MG/ML
10 INJECTION INTRAMUSCULAR
Refills: 0 | Status: DISCONTINUED | OUTPATIENT
Start: 2022-08-05 | End: 2022-08-08

## 2022-08-05 RX ADMIN — ARIPIPRAZOLE 5 MILLIGRAM(S): 15 TABLET ORAL at 10:00

## 2022-08-05 RX ADMIN — CHLORHEXIDINE GLUCONATE 15 MILLILITER(S): 213 SOLUTION TOPICAL at 10:03

## 2022-08-05 RX ADMIN — RISPERIDONE 1 MILLIGRAM(S): 4 TABLET ORAL at 21:53

## 2022-08-05 RX ADMIN — HALOPERIDOL DECANOATE 5 MILLIGRAM(S): 100 INJECTION INTRAMUSCULAR at 10:01

## 2022-08-05 RX ADMIN — HALOPERIDOL DECANOATE 10 MILLIGRAM(S): 100 INJECTION INTRAMUSCULAR at 20:45

## 2022-08-05 RX ADMIN — CHLORHEXIDINE GLUCONATE 15 MILLILITER(S): 213 SOLUTION TOPICAL at 20:46

## 2022-08-05 RX ADMIN — RISPERIDONE 2 MILLIGRAM(S): 4 TABLET ORAL at 10:01
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
## 2022-08-05 NOTE — BH INPATIENT PSYCHIATRY PROGRESS NOTE - PRN MEDS
MEDICATIONS  (PRN):  OLANZapine Injectable 5 milliGRAM(s) IntraMuscular at bedtime PRN refusing abilify PO   MEDICATIONS  (PRN):  haloperidol    Injectable 5 milliGRAM(s) IntraMuscular two times a day PRN if haldol PO refused

## 2022-08-05 NOTE — BH INPATIENT PSYCHIATRY PROGRESS NOTE - MSE UNSTRUCTURED FT
Appearance: TD of hands, truncal muscles, affecting gait but less unstable than initial presentation; no overt deficits in hygiene; has poor dentition  Behavior: superficially cooperative, remains guarded, attentive  Speech: articulation remains poor due to poor dentition and TD; she is also soft spoken - difficult to hear her but she refuses to speak privately  Mood: denies complaints   Affect: neutral, constricted, stable  Thought process: illogical, concrete, disorganized  Thought content: paranoid delusions continue, no reports of SI  Perceptual disturbances: no appearance of internal preoccupation  Orientation: unable to ascertain due to poor cooperation; no obvious deficits in recall  Abstraction: concrete  Fund of knowledge: limited  Insight: poor  Judgement: poor     Appearance: poor oral hygiene; has poor dentition, no distress; alert; TD of trunk, hands, LE remain  Behavior: guarded, poorly engaged  Speech: poor articulation due to TD, soft spoken, limited spontaneity today  Mood: denies complaints   Affect: neutral, constricted, stable  Thought process: illogical, concrete, disorganized  Thought content: paranoid delusions continue, no reports of SI  Perceptual disturbances: no appearance of internal preoccupation  Orientation: unable to ascertain due to poor cooperation; no obvious deficits in recall  Abstraction: concrete  Fund of knowledge: limited  Insight: poor  Judgement: poor

## 2022-08-05 NOTE — BH INPATIENT PSYCHIATRY PROGRESS NOTE - NSBHFUPINTERVALHXFT_PSY_A_CORE
Chart reviewed and case discussed with treatment team. Staff report    Chart reviewed and case discussed with treatment team. Staff report patient has been calm, isolative, paranoid but compliant with treatment over objection ordered medications. Patient continues to refuse assessment in private or quiet space so conversation is difficult due to the noise in the day room. Writer discussed agranulocytosis and reason for switch to haldol. It's unclear if she heard or understood what writer said.

## 2022-08-05 NOTE — BH INPATIENT PSYCHIATRY PROGRESS NOTE - CURRENT MEDICATION
MEDICATIONS  (STANDING):  ARIPiprazole 5 milliGRAM(s) Oral daily  chlorhexidine 0.12% Liquid 15 milliLiter(s) Swish and Spit two times a day  haloperidol    Concentrate 5 milliGRAM(s) Oral two times a day  risperiDONE   Solution 2 milliGRAM(s) Oral two times a day    MEDICATIONS  (PRN):  OLANZapine Injectable 5 milliGRAM(s) IntraMuscular at bedtime PRN refusing abilify PO   MEDICATIONS  (STANDING):  chlorhexidine 0.12% Liquid 15 milliLiter(s) Swish and Spit two times a day  haloperidol    Concentrate 10 milliGRAM(s) Oral two times a day  risperiDONE   Solution 1 milliGRAM(s) Oral two times a day    MEDICATIONS  (PRN):  haloperidol    Injectable 5 milliGRAM(s) IntraMuscular two times a day PRN if haldol PO refused

## 2022-08-05 NOTE — BH INPATIENT PSYCHIATRY PROGRESS NOTE - NSBHCHARTREVIEWVS_PSY_A_CORE FT
Vital Signs Last 24 Hrs  T(C): 36.4 (08-05-22 @ 15:35), Max: 36.4 (08-05-22 @ 15:35)  T(F): 97.6 (08-05-22 @ 15:35), Max: 97.6 (08-05-22 @ 15:35)  HR: --  BP: --  BP(mean): --  RR: --  SpO2: --    Orthostatic VS  08-05-22 @ 08:02  Lying BP: --/-- HR: --  Sitting BP: 107/66 HR: 72  Standing BP: 106/67 HR: 88  Site: --  Mode: electronic  Orthostatic VS  08-04-22 @ 05:53  Lying BP: 109/65 HR: 61  Sitting BP: 101/74 HR: 67  Standing BP: --/-- HR: --  Site: --  Mode: --

## 2022-08-06 RX ADMIN — HALOPERIDOL DECANOATE 10 MILLIGRAM(S): 100 INJECTION INTRAMUSCULAR at 09:25

## 2022-08-06 RX ADMIN — CHLORHEXIDINE GLUCONATE 15 MILLILITER(S): 213 SOLUTION TOPICAL at 09:25

## 2022-08-06 RX ADMIN — CHLORHEXIDINE GLUCONATE 15 MILLILITER(S): 213 SOLUTION TOPICAL at 21:03

## 2022-08-06 RX ADMIN — RISPERIDONE 1 MILLIGRAM(S): 4 TABLET ORAL at 09:29

## 2022-08-06 RX ADMIN — HALOPERIDOL DECANOATE 10 MILLIGRAM(S): 100 INJECTION INTRAMUSCULAR at 21:04

## 2022-08-06 RX ADMIN — RISPERIDONE 1 MILLIGRAM(S): 4 TABLET ORAL at 21:04

## 2022-08-07 RX ADMIN — HALOPERIDOL DECANOATE 10 MILLIGRAM(S): 100 INJECTION INTRAMUSCULAR at 20:29

## 2022-08-07 RX ADMIN — CHLORHEXIDINE GLUCONATE 15 MILLILITER(S): 213 SOLUTION TOPICAL at 20:29

## 2022-08-07 RX ADMIN — RISPERIDONE 1 MILLIGRAM(S): 4 TABLET ORAL at 21:17

## 2022-08-07 RX ADMIN — HALOPERIDOL DECANOATE 10 MILLIGRAM(S): 100 INJECTION INTRAMUSCULAR at 09:25

## 2022-08-07 RX ADMIN — RISPERIDONE 1 MILLIGRAM(S): 4 TABLET ORAL at 10:31

## 2022-08-07 RX ADMIN — CHLORHEXIDINE GLUCONATE 15 MILLILITER(S): 213 SOLUTION TOPICAL at 09:26

## 2022-08-08 LAB
ANISOCYTOSIS BLD QL: SLIGHT — SIGNIFICANT CHANGE UP
BASOPHILS # BLD AUTO: 0.03 K/UL — SIGNIFICANT CHANGE UP (ref 0–0.2)
BASOPHILS NFR BLD AUTO: 0.9 % — SIGNIFICANT CHANGE UP (ref 0–2)
EOSINOPHIL # BLD AUTO: 0.06 K/UL — SIGNIFICANT CHANGE UP (ref 0–0.5)
EOSINOPHIL NFR BLD AUTO: 1.7 % — SIGNIFICANT CHANGE UP (ref 0–6)
HCT VFR BLD CALC: 41.3 % — SIGNIFICANT CHANGE UP (ref 34.5–45)
HGB BLD-MCNC: 12.5 G/DL — SIGNIFICANT CHANGE UP (ref 11.5–15.5)
IANC: 1.4 K/UL — LOW (ref 1.8–7.4)
LYMPHOCYTES # BLD AUTO: 1.39 K/UL — SIGNIFICANT CHANGE UP (ref 1–3.3)
LYMPHOCYTES # BLD AUTO: 42.6 % — SIGNIFICANT CHANGE UP (ref 13–44)
MACROCYTES BLD QL: SLIGHT — SIGNIFICANT CHANGE UP
MANUAL SMEAR VERIFICATION: SIGNIFICANT CHANGE UP
MCHC RBC-ENTMCNC: 28.2 PG — SIGNIFICANT CHANGE UP (ref 27–34)
MCHC RBC-ENTMCNC: 30.3 GM/DL — LOW (ref 32–36)
MCV RBC AUTO: 93.2 FL — SIGNIFICANT CHANGE UP (ref 80–100)
MONOCYTES # BLD AUTO: 0.23 K/UL — SIGNIFICANT CHANGE UP (ref 0–0.9)
MONOCYTES NFR BLD AUTO: 7 % — SIGNIFICANT CHANGE UP (ref 2–14)
NEUTROPHILS # BLD AUTO: 1.3 K/UL — LOW (ref 1.8–7.4)
NEUTROPHILS NFR BLD AUTO: 40 % — LOW (ref 43–77)
OVALOCYTES BLD QL SMEAR: SLIGHT — SIGNIFICANT CHANGE UP
PLAT MORPH BLD: NORMAL — SIGNIFICANT CHANGE UP
PLATELET # BLD AUTO: 252 K/UL — SIGNIFICANT CHANGE UP (ref 150–400)
PLATELET COUNT - ESTIMATE: NORMAL — SIGNIFICANT CHANGE UP
POIKILOCYTOSIS BLD QL AUTO: SLIGHT — SIGNIFICANT CHANGE UP
POLYCHROMASIA BLD QL SMEAR: SLIGHT — SIGNIFICANT CHANGE UP
RBC # BLD: 4.43 M/UL — SIGNIFICANT CHANGE UP (ref 3.8–5.2)
RBC # FLD: 13.4 % — SIGNIFICANT CHANGE UP (ref 10.3–14.5)
RBC BLD AUTO: ABNORMAL
VARIANT LYMPHS # BLD: 7.8 % — HIGH (ref 0–6)
WBC # BLD: 3.26 K/UL — LOW (ref 3.8–10.5)
WBC # FLD AUTO: 3.26 K/UL — LOW (ref 3.8–10.5)

## 2022-08-08 RX ORDER — HALOPERIDOL DECANOATE 100 MG/ML
15 INJECTION INTRAMUSCULAR
Refills: 0 | Status: DISCONTINUED | OUTPATIENT
Start: 2022-08-08 | End: 2022-08-18

## 2022-08-08 RX ADMIN — RISPERIDONE 1 MILLIGRAM(S): 4 TABLET ORAL at 09:15

## 2022-08-08 RX ADMIN — CHLORHEXIDINE GLUCONATE 15 MILLILITER(S): 213 SOLUTION TOPICAL at 20:09

## 2022-08-08 RX ADMIN — HALOPERIDOL DECANOATE 10 MILLIGRAM(S): 100 INJECTION INTRAMUSCULAR at 09:15

## 2022-08-08 RX ADMIN — CHLORHEXIDINE GLUCONATE 15 MILLILITER(S): 213 SOLUTION TOPICAL at 09:15

## 2022-08-08 RX ADMIN — HALOPERIDOL DECANOATE 15 MILLIGRAM(S): 100 INJECTION INTRAMUSCULAR at 20:08
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
## 2022-08-08 NOTE — BH INPATIENT PSYCHIATRY PROGRESS NOTE - NSBHMETABOLIC_PSY_ALL_CORE_FT
BMI: BMI (kg/m2): 20.4 (07-30-22 @ 15:41)  HbA1c: A1C with Estimated Average Glucose Result: 5.9 % (06-19-22 @ 08:45)    Glucose:   BP: 124/76 (08-08-22 @ 08:49) (124/76 - 124/76)  Lipid Panel: Date/Time: 06-19-22 @ 08:45  Cholesterol, Serum: 165  Direct LDL: --  HDL Cholesterol, Serum: 64  Total Cholesterol/HDL Ration Measurement: --  Triglycerides, Serum: 58

## 2022-08-08 NOTE — BH INPATIENT PSYCHIATRY PROGRESS NOTE - NSBHCHARTREVIEWVS_PSY_A_CORE FT
Vital Signs Last 24 Hrs  T(C): 36.2 (08-08-22 @ 08:49), Max: 37 (08-07-22 @ 16:22)  T(F): 97.2 (08-08-22 @ 08:49), Max: 98.6 (08-07-22 @ 16:22)  HR: 76 (08-08-22 @ 08:49) (76 - 76)  BP: 124/76 (08-08-22 @ 08:49) (124/76 - 124/76)  BP(mean): --  RR: --  SpO2: --    Orthostatic VS  08-07-22 @ 07:52  Lying BP: --/-- HR: --  Sitting BP: 112/67 HR: 67  Standing BP: 105/65 HR: 73  Site: --  Mode: --

## 2022-08-08 NOTE — BH INPATIENT PSYCHIATRY PROGRESS NOTE - CURRENT MEDICATION
MEDICATIONS  (STANDING):  chlorhexidine 0.12% Liquid 15 milliLiter(s) Swish and Spit two times a day  haloperidol    Concentrate 10 milliGRAM(s) Oral two times a day    MEDICATIONS  (PRN):  haloperidol    Injectable 5 milliGRAM(s) IntraMuscular two times a day PRN if haldol PO refused

## 2022-08-08 NOTE — BH INPATIENT PSYCHIATRY PROGRESS NOTE - MSE UNSTRUCTURED FT
Appearance: alert, appears stated age, thin habitus; better oral hygiene; has poor dentition, no distress; TD less severe today  Behavior: guarded, poorly engaged  Speech: poor articulation due to limited dentition, soft spoken, pressured and talks over this writer when she gets defensive as she did today  Mood: denies complaints   Affect: neutral, constricted, stable  Thought process: illogical, concrete, disorganized  Thought content: paranoid delusions continue, no reports of SI  Perceptual disturbances: no appearance of internal preoccupation  Orientation: unable to ascertain due to poor cooperation; no obvious deficits in recall  Abstraction: concrete  Fund of knowledge: limited  Insight: poor  Judgement: poor

## 2022-08-08 NOTE — BH INPATIENT PSYCHIATRY PROGRESS NOTE - PRN MEDS
MEDICATIONS  (PRN):  haloperidol    Injectable 5 milliGRAM(s) IntraMuscular two times a day PRN if haldol PO refused
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
## 2022-08-09 RX ADMIN — CHLORHEXIDINE GLUCONATE 15 MILLILITER(S): 213 SOLUTION TOPICAL at 08:53

## 2022-08-09 RX ADMIN — HALOPERIDOL DECANOATE 15 MILLIGRAM(S): 100 INJECTION INTRAMUSCULAR at 20:34

## 2022-08-09 RX ADMIN — HALOPERIDOL DECANOATE 15 MILLIGRAM(S): 100 INJECTION INTRAMUSCULAR at 08:53

## 2022-08-09 RX ADMIN — CHLORHEXIDINE GLUCONATE 15 MILLILITER(S): 213 SOLUTION TOPICAL at 20:34

## 2022-08-09 NOTE — BH INPATIENT PSYCHIATRY PROGRESS NOTE - NSBHFUPINTERVALHXFT_PSY_A_CORE
Chart reviewed and case discussed with treatment team. Staff report patient has been isolative though calm. Patient is guarded. Writer noted that she seemed somewhat subdued. Writer mentioned she has frequently been found falling asleep in the day room. Patient states she was just meditating and calls herself "spiritually Restorationist" and that "I pray a lot." Writer offered to change the dosing so she gets more at night but she declined.
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
## 2022-08-09 NOTE — BH INPATIENT PSYCHIATRY PROGRESS NOTE - CURRENT MEDICATION
MEDICATIONS  (STANDING):  chlorhexidine 0.12% Liquid 15 milliLiter(s) Swish and Spit two times a day  haloperidol    Concentrate 15 milliGRAM(s) Oral two times a day    MEDICATIONS  (PRN):  haloperidol    Injectable 5 milliGRAM(s) IntraMuscular two times a day PRN if haldol PO refused

## 2022-08-09 NOTE — BH INPATIENT PSYCHIATRY PROGRESS NOTE - NSBHCHARTREVIEWVS_PSY_A_CORE FT
Vital Signs Last 24 Hrs  T(C): 36.3 (08-09-22 @ 15:25), Max: 36.6 (08-09-22 @ 05:29)  T(F): 97.3 (08-09-22 @ 15:25), Max: 97.9 (08-09-22 @ 05:29)  HR: --  BP: --  BP(mean): --  RR: 18 (08-09-22 @ 05:29) (18 - 18)  SpO2: --    Orthostatic VS  08-09-22 @ 05:29  Lying BP: 113/59 HR: 69  Sitting BP: 114/60 HR: 59  Standing BP: --/-- HR: --  Site: upper left arm  Mode: electronic

## 2022-08-09 NOTE — BH INPATIENT PSYCHIATRY PROGRESS NOTE - MSE UNSTRUCTURED FT
Appearance: thin habitus; poor oral hygiene; has poor dentition, no distress; No significant TD noted today  Behavior: guarded, defensive  Speech: not pressured per se but does frequently talk over this writer; soft spoken  Mood: denies complaints   Affect: superficial, constricted, stable  Thought process: illogical, concrete, disorganized  Thought content: paranoid delusions continue, no reports of SI  Perceptual disturbances: no appearance of internal preoccupation  Orientation: unable to ascertain due to poor cooperation; no obvious deficits in recall  Abstraction: concrete  Fund of knowledge: limited  Insight: poor  Judgement: poor

## 2022-08-10 LAB
BASOPHILS # BLD AUTO: 0.05 K/UL — SIGNIFICANT CHANGE UP (ref 0–0.2)
BASOPHILS NFR BLD AUTO: 1.6 % — SIGNIFICANT CHANGE UP (ref 0–2)
EOSINOPHIL # BLD AUTO: 0.06 K/UL — SIGNIFICANT CHANGE UP (ref 0–0.5)
EOSINOPHIL NFR BLD AUTO: 1.9 % — SIGNIFICANT CHANGE UP (ref 0–6)
HCT VFR BLD CALC: 39.8 % — SIGNIFICANT CHANGE UP (ref 34.5–45)
HGB BLD-MCNC: 12.3 G/DL — SIGNIFICANT CHANGE UP (ref 11.5–15.5)
IANC: 1.57 K/UL — LOW (ref 1.8–7.4)
IMM GRANULOCYTES NFR BLD AUTO: 0 % — SIGNIFICANT CHANGE UP (ref 0–1.5)
LYMPHOCYTES # BLD AUTO: 1.21 K/UL — SIGNIFICANT CHANGE UP (ref 1–3.3)
LYMPHOCYTES # BLD AUTO: 37.9 % — SIGNIFICANT CHANGE UP (ref 13–44)
MCHC RBC-ENTMCNC: 28.6 PG — SIGNIFICANT CHANGE UP (ref 27–34)
MCHC RBC-ENTMCNC: 30.9 GM/DL — LOW (ref 32–36)
MCV RBC AUTO: 92.6 FL — SIGNIFICANT CHANGE UP (ref 80–100)
MONOCYTES # BLD AUTO: 0.3 K/UL — SIGNIFICANT CHANGE UP (ref 0–0.9)
MONOCYTES NFR BLD AUTO: 9.4 % — SIGNIFICANT CHANGE UP (ref 2–14)
NEUTROPHILS # BLD AUTO: 1.57 K/UL — LOW (ref 1.8–7.4)
NEUTROPHILS NFR BLD AUTO: 49.2 % — SIGNIFICANT CHANGE UP (ref 43–77)
NRBC # BLD: 0 /100 WBCS — SIGNIFICANT CHANGE UP
NRBC # FLD: 0 K/UL — SIGNIFICANT CHANGE UP
PLATELET # BLD AUTO: 209 K/UL — SIGNIFICANT CHANGE UP (ref 150–400)
RBC # BLD: 4.3 M/UL — SIGNIFICANT CHANGE UP (ref 3.8–5.2)
RBC # FLD: 13.6 % — SIGNIFICANT CHANGE UP (ref 10.3–14.5)
WBC # BLD: 3.19 K/UL — LOW (ref 3.8–10.5)
WBC # FLD AUTO: 3.19 K/UL — LOW (ref 3.8–10.5)

## 2022-08-10 RX ADMIN — CHLORHEXIDINE GLUCONATE 15 MILLILITER(S): 213 SOLUTION TOPICAL at 08:59

## 2022-08-10 RX ADMIN — CHLORHEXIDINE GLUCONATE 15 MILLILITER(S): 213 SOLUTION TOPICAL at 21:28

## 2022-08-10 RX ADMIN — HALOPERIDOL DECANOATE 15 MILLIGRAM(S): 100 INJECTION INTRAMUSCULAR at 21:28

## 2022-08-10 RX ADMIN — HALOPERIDOL DECANOATE 15 MILLIGRAM(S): 100 INJECTION INTRAMUSCULAR at 08:58

## 2022-08-10 NOTE — BH INPATIENT PSYCHIATRY PROGRESS NOTE - NSBHFUPINTERVALHXFT_PSY_A_CORE
Chart reviewed and case discussed with treatment team. Staff report patient remains guarded but calm. Patient is no longer having spontaneous paranoid but her beliefs about her landlord remains.
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
## 2022-08-10 NOTE — BH INPATIENT PSYCHIATRY PROGRESS NOTE - NSBHMETABOLIC_PSY_ALL_CORE_FT
BMI: BMI (kg/m2): 20.4 (07-30-22 @ 15:41)  HbA1c: A1C with Estimated Average Glucose Result: 5.9 % (06-19-22 @ 08:45)    Glucose:   BP: 106/70 (08-10-22 @ 08:01) (106/70 - 124/76)  Lipid Panel: Date/Time: 06-19-22 @ 08:45  Cholesterol, Serum: 165  Direct LDL: --  HDL Cholesterol, Serum: 64  Total Cholesterol/HDL Ration Measurement: --  Triglycerides, Serum: 58

## 2022-08-10 NOTE — BH INPATIENT PSYCHIATRY PROGRESS NOTE - MSE UNSTRUCTURED FT
Appearance: still has poor oral hygiene; No significant TD noted today; stable gait  Behavior: oddly related, very guarded  Speech: soft spoken, limited articulation due to poor dentition, not pressured today  Mood: denies complaints   Affect: neutral, constricted, stable  Thought process: illogical, concrete, perseverative at times  Thought content: paranoid delusions continue but not spontaneous, no reports of SI  Perceptual disturbances: no appearance of internal preoccupation  Orientation: unable to ascertain due to poor cooperation; no obvious deficits in recall  Abstraction: concrete  Fund of knowledge: limited  Insight: poor  Judgement: poor

## 2022-08-10 NOTE — BH INPATIENT PSYCHIATRY PROGRESS NOTE - NSBHCHARTREVIEWVS_PSY_A_CORE FT
Vital Signs Last 24 Hrs  T(C): 36.4 (08-10-22 @ 15:39), Max: 36.4 (08-10-22 @ 15:39)  T(F): 97.6 (08-10-22 @ 15:39), Max: 97.6 (08-10-22 @ 15:39)  HR: 66 (08-10-22 @ 08:01) (66 - 66)  BP: 106/70 (08-10-22 @ 08:01) (106/70 - 106/70)  BP(mean): --  RR: --  SpO2: --    Orthostatic VS  08-10-22 @ 08:01  Lying BP: --/-- HR: --  Sitting BP: 106/70 HR: 66  Standing BP: 106/74 HR: 78  Site: --  Mode: --  Orthostatic VS  08-09-22 @ 05:29  Lying BP: 113/59 HR: 69  Sitting BP: 114/60 HR: 59  Standing BP: --/-- HR: --  Site: upper left arm  Mode: electronic

## 2022-08-11 RX ADMIN — CHLORHEXIDINE GLUCONATE 15 MILLILITER(S): 213 SOLUTION TOPICAL at 09:24

## 2022-08-11 RX ADMIN — CHLORHEXIDINE GLUCONATE 15 MILLILITER(S): 213 SOLUTION TOPICAL at 20:58

## 2022-08-11 RX ADMIN — HALOPERIDOL DECANOATE 15 MILLIGRAM(S): 100 INJECTION INTRAMUSCULAR at 20:54

## 2022-08-11 RX ADMIN — HALOPERIDOL DECANOATE 15 MILLIGRAM(S): 100 INJECTION INTRAMUSCULAR at 09:24

## 2022-08-11 NOTE — BH INPATIENT PSYCHIATRY PROGRESS NOTE - NSBHMETABOLIC_PSY_ALL_CORE_FT
BMI: BMI (kg/m2): 20.4 (07-30-22 @ 15:41)  HbA1c: A1C with Estimated Average Glucose Result: 5.9 % (06-19-22 @ 08:45)    Glucose:   BP: 124/75 (08-11-22 @ 08:00) (106/70 - 124/75)  Lipid Panel: Date/Time: 06-19-22 @ 08:45  Cholesterol, Serum: 165  Direct LDL: --  HDL Cholesterol, Serum: 64  Total Cholesterol/HDL Ration Measurement: --  Triglycerides, Serum: 58

## 2022-08-11 NOTE — BH INPATIENT PSYCHIATRY PROGRESS NOTE - MSE UNSTRUCTURED FT
Appearance: thin habitus, adequate grooming, poor oral hygiene; No significant TD noted today; stable gait  Behavior: superficial, guarded, no psychomotor retardation/agitation though patient sits in the same chair daily for most of the day  Speech: soft spoken, mostly nonspontaneous unless her wishes or paranoid thinking are challenged after which she becomes very pressured and difficult to redirect   Mood: denies complaints   Affect: neutral, constricted, stable  Thought process: illogical, concrete, perseverative   Thought content: paranoid delusions continue but not spontaneous, no reports of SI  Perceptual disturbances: no appearance of internal preoccupation  Orientation: patient refers to the white board in front of her where date is noted - she declines to move from he chair for assessment; no obvious deficits in recall  Abstraction: concrete  Fund of knowledge: limited  Insight: poor  Judgement: poor

## 2022-08-11 NOTE — BH INPATIENT PSYCHIATRY PROGRESS NOTE - NSBHCHARTREVIEWVS_PSY_A_CORE FT
headache since 2-3 hours ago; denies dizziness, syncope or chest pain; no weakness or numbness, with clear speech Vital Signs Last 24 Hrs  T(C): 36.4 (07-11-22 @ 15:31), Max: 36.4 (07-11-22 @ 15:31)  T(F): 97.6 (07-11-22 @ 15:31), Max: 97.6 (07-11-22 @ 15:31)  HR: --  BP: --  BP(mean): --  RR: --  SpO2: --    Orthostatic VS  07-11-22 @ 08:00  Lying BP: --/-- HR: --  Sitting BP: 115/74 HR: 66  Standing BP: 115/72 HR: 78  Site: --  Mode: --  Orthostatic VS  07-10-22 @ 08:05  Lying BP: --/-- HR: --  Sitting BP: 117/80 HR: 65  Standing BP: 119/76 HR: 75  Site: upper right arm  Mode: electronic

## 2022-08-11 NOTE — BH INPATIENT PSYCHIATRY PROGRESS NOTE - NSBHFUPINTERVALHXFT_PSY_A_CORE
Chart reviewed and case discussed with treatment team. Staff report patient remains calm despite being paranoid. Patient is minimally engaged in unit activities. Patient remains paranoid about her landlord. She is still refusing to allow access to records or to discuss care with family. She is compliant with medications however. She vows she will not take haldol decanoate. She was informed there is a treatment over objection order that would allow us to administer it, particularly since she has stated she would not take any medications on discharge.

## 2022-08-11 NOTE — BH INPATIENT PSYCHIATRY PROGRESS NOTE - NSBHCHARTREVIEWVS_PSY_A_CORE FT
Vital Signs Last 24 Hrs  T(C): 36.8 (08-11-22 @ 15:24), Max: 36.8 (08-11-22 @ 15:24)  T(F): 98.2 (08-11-22 @ 15:24), Max: 98.2 (08-11-22 @ 15:24)  HR: 74 (08-11-22 @ 08:00) (74 - 74)  BP: 124/75 (08-11-22 @ 08:00) (124/75 - 124/75)  BP(mean): --  RR: --  SpO2: --    Orthostatic VS  08-10-22 @ 08:01  Lying BP: --/-- HR: --  Sitting BP: 106/70 HR: 66  Standing BP: 106/74 HR: 78  Site: --  Mode: --

## 2022-08-11 NOTE — BH INPATIENT PSYCHIATRY PROGRESS NOTE - NSBHASSESSSUMMFT_PSY_ALL_CORE
72 year old  woman, domiciled with daughter, hx of chronic paranoid schizophrenia with multiple prior psychiatric hospitalizations and long standing hx of medication nonadherence, no hx of suicide attempts or suicidality, BIB EMS activated by patient’s daughter due to confrontational behavior towards landlord due to paranoia towards him. She has had incidents like this in the past, also including paranoia towards her neighbors.  Daughter reports patient's behavior has been very concerning due to inability to care for self (severe weight loss) and confrontational behavior that gets her into trouble with people around her. TOO obtained - has been med compliant. Patient remains paranoid, guarded, disorganized, uncooperative with assessments and aftercare planning but has had no outbursts. Meds being titrated while AOT/ACT being pursued but recently showed agranulocytosis with risperidone. She was switched to haldol and ANC is being trended    Plan:    1) Disposition:   - continue inpatient care for now due to psychosis  - d/c to home on AOT/ACT  2) Legal status: 9.27   3) Psychotropic medications:  - continue haldol 15mg po bid - will plan for RAYO once there is sustained improvement in ANC; check CBC tomorrow  - will consider acquiring ingrezza for patient's TD though it has been improving since admission  - on TOO - haldol IM if patient refuses PO  4) Non-pharmacologic interventions:  - individual, group, milieu therapy as appropriate  5) Medical comorbidities:  - no acute needs other than gait instability  6) Work up:  - repeat CBC tomorrow  7) Social issues: patient not allowing care coordination with family or to obtain records from other providers; will refer to AOT which may allow treatment team obtain records; ACT application submitted  8) Psychoeducation: Risks and benefits discussed with patient - she has limited understanding and insight

## 2022-08-11 NOTE — BH TREATMENT PLAN - NSTXOTHERINTERRN_PSY_ALL_CORE
Provide positive reinforcement for treatment adhrence.
Provide positive reinforcement for treatment adhrence.

## 2022-08-12 LAB
BASOPHILS # BLD AUTO: 0.05 K/UL — SIGNIFICANT CHANGE UP (ref 0–0.2)
BASOPHILS NFR BLD AUTO: 1.3 % — SIGNIFICANT CHANGE UP (ref 0–2)
EOSINOPHIL # BLD AUTO: 0.07 K/UL — SIGNIFICANT CHANGE UP (ref 0–0.5)
EOSINOPHIL NFR BLD AUTO: 1.8 % — SIGNIFICANT CHANGE UP (ref 0–6)
HCT VFR BLD CALC: 40.7 % — SIGNIFICANT CHANGE UP (ref 34.5–45)
HGB BLD-MCNC: 12.6 G/DL — SIGNIFICANT CHANGE UP (ref 11.5–15.5)
IANC: 1.74 K/UL — LOW (ref 1.8–7.4)
IMM GRANULOCYTES NFR BLD AUTO: 0.3 % — SIGNIFICANT CHANGE UP (ref 0–1.5)
LYMPHOCYTES # BLD AUTO: 1.57 K/UL — SIGNIFICANT CHANGE UP (ref 1–3.3)
LYMPHOCYTES # BLD AUTO: 40.5 % — SIGNIFICANT CHANGE UP (ref 13–44)
MCHC RBC-ENTMCNC: 28.6 PG — SIGNIFICANT CHANGE UP (ref 27–34)
MCHC RBC-ENTMCNC: 31 GM/DL — LOW (ref 32–36)
MCV RBC AUTO: 92.5 FL — SIGNIFICANT CHANGE UP (ref 80–100)
MONOCYTES # BLD AUTO: 0.44 K/UL — SIGNIFICANT CHANGE UP (ref 0–0.9)
MONOCYTES NFR BLD AUTO: 11.3 % — SIGNIFICANT CHANGE UP (ref 2–14)
NEUTROPHILS # BLD AUTO: 1.74 K/UL — LOW (ref 1.8–7.4)
NEUTROPHILS NFR BLD AUTO: 44.8 % — SIGNIFICANT CHANGE UP (ref 43–77)
NRBC # BLD: 0 /100 WBCS — SIGNIFICANT CHANGE UP
NRBC # FLD: 0 K/UL — SIGNIFICANT CHANGE UP
PLATELET # BLD AUTO: 212 K/UL — SIGNIFICANT CHANGE UP (ref 150–400)
RBC # BLD: 4.4 M/UL — SIGNIFICANT CHANGE UP (ref 3.8–5.2)
RBC # FLD: 13.6 % — SIGNIFICANT CHANGE UP (ref 10.3–14.5)
WBC # BLD: 3.88 K/UL — SIGNIFICANT CHANGE UP (ref 3.8–10.5)
WBC # FLD AUTO: 3.88 K/UL — SIGNIFICANT CHANGE UP (ref 3.8–10.5)

## 2022-08-12 PROCEDURE — 99232 SBSQ HOSP IP/OBS MODERATE 35: CPT

## 2022-08-12 PROCEDURE — 90853 GROUP PSYCHOTHERAPY: CPT

## 2022-08-12 RX ADMIN — HALOPERIDOL DECANOATE 15 MILLIGRAM(S): 100 INJECTION INTRAMUSCULAR at 21:07

## 2022-08-12 RX ADMIN — HALOPERIDOL DECANOATE 15 MILLIGRAM(S): 100 INJECTION INTRAMUSCULAR at 09:11

## 2022-08-12 RX ADMIN — CHLORHEXIDINE GLUCONATE 15 MILLILITER(S): 213 SOLUTION TOPICAL at 21:07

## 2022-08-12 RX ADMIN — CHLORHEXIDINE GLUCONATE 15 MILLILITER(S): 213 SOLUTION TOPICAL at 09:12

## 2022-08-12 NOTE — BH PSYCHOLOGY - GROUP THERAPY NOTE - NSPSYCHOLGRPCOGINT_PSY_A_CORE
explain the connection between health habits and stress vulnerability/group members provided support/group members suggested positive behaviors/behavioral distress tolerance skills taught
explain the connection between health habits and stress vulnerability/group members provided support/mindfulness skills taught

## 2022-08-12 NOTE — BH PSYCHOLOGY - GROUP THERAPY NOTE - NSPSYCHOLGRPCOGGOAL_PSY_A_CORE
reduce reaction to psychosocial stressors/learn cognitive skills to improve coping with stress
reduce reaction to psychosocial stressors/learn cognitive skills to improve coping with stress

## 2022-08-12 NOTE — BH INPATIENT PSYCHIATRY PROGRESS NOTE - NSBHCHARTREVIEWVS_PSY_A_CORE FT
Vital Signs Last 24 Hrs  T(C): 36.2 (08-12-22 @ 15:19), Max: 36.3 (08-12-22 @ 08:09)  T(F): 97.1 (08-12-22 @ 15:19), Max: 97.3 (08-12-22 @ 08:09)  HR: --  BP: --  BP(mean): --  RR: --  SpO2: --    Orthostatic VS  08-12-22 @ 08:09  Lying BP: --/-- HR: --  Sitting BP: 108/67 HR: 63  Standing BP: 105/68 HR: 76  Site: --  Mode: --

## 2022-08-12 NOTE — BH INPATIENT PSYCHIATRY PROGRESS NOTE - NSBHASSESSSUMMFT_PSY_ALL_CORE
72 year old  woman, domiciled with daughter, hx of chronic paranoid schizophrenia with multiple prior psychiatric hospitalizations and long standing hx of medication nonadherence, no hx of suicide attempts or suicidality, BIB EMS activated by patient’s daughter due to confrontational behavior towards landlord due to paranoia towards him. She has had incidents like this in the past, also including paranoia towards her neighbors.  Daughter reports patient's behavior has been very concerning due to inability to care for self (severe weight loss) and confrontational behavior that gets her into trouble with people around her. TOO obtained - has been med compliant. Patient remains paranoid, guarded, disorganized, uncooperative with assessments and aftercare planning but has had no outbursts. Meds being titrated while AOT/ACT being pursued but recently showed agranulocytosis with risperidone. She was switched to haldol and ANC is being trended with gradual improvement.     Plan:    1) Disposition:   - continue inpatient care for now due to psychosis  - d/c to home on AOT/ACT  2) Legal status: 9.27   3) Psychotropic medications:  - continue haldol 15mg po bid - will plan for RAYO once there is sustained improvement in ANC; check CBC on 8/15/22  - will consider acquiring ingrezza for patient's TD though it has been improving since admission  - on TOO - haldol IM if patient refuses PO  4) Non-pharmacologic interventions:  - individual, group, milieu therapy as appropriate  5) Medical comorbidities:  - no acute needs other than gait instability  6) Work up:  - repeat CBC tomorrow  7) Social issues: patient not allowing care coordination with family or to obtain records from other providers; will refer to AOT which may allow treatment team obtain records; ACT application submitted  8) Psychoeducation: Risks and benefits discussed with patient, psychoeducation provided but patient still has poor insight and judgement

## 2022-08-12 NOTE — BH INPATIENT PSYCHIATRY PROGRESS NOTE - MSE UNSTRUCTURED FT
Appearance: thin habitus, better oral hygiene; No significant TD noted today; stable gait  Behavior: no psychomotor agitation or retardation but frequently staff seeking today  Speech: soft spoken, more spontaneous given her increased anxiety, poor articulation as usual   Mood: worried   Affect: congruent, intense, stable  Thought process: illogical, concrete, perseverative   Thought content: paranoid delusions noted, no reports of SI  Perceptual disturbances: no appearance of internal preoccupation  Orientation: did not answer questions about orientation today  Abstraction: concrete  Fund of knowledge: limited  Insight: poor  Judgement: poor

## 2022-08-12 NOTE — BH PSYCHOLOGY - GROUP THERAPY NOTE - NSPSYCHOLGRPCOGPROB_PSY_A_CORE
anxiety/dysphoria/lack of behaviors to reduce symptoms
anxiety/dysphoria/lack of behaviors to reduce symptoms

## 2022-08-12 NOTE — BH INPATIENT PSYCHIATRY PROGRESS NOTE - NSBHFUPINTERVALHXFT_PSY_A_CORE
Chart reviewed and case discussed with treatment team. Staff report patient was quiet overnight. However, she became very anxious this afternoon, very suddenly being insistent she be discharged for 2 days so she can take care of her bills. Patient declined to allow staff to help coordinate bill paying or to allow involvement of her daughter. She claims someone will take her things but too paranoid to say what will be taken away. She does not want her daughter knowing about this. She is disorganized and illogical, asking why she is being held here, why she is being punished when she has done everything that was asked of her.

## 2022-08-12 NOTE — BH PSYCHOLOGY - GROUP THERAPY NOTE - NSBHPSYCHOLADDL_PSY_A_CORE
In group session patients discussed coping. Psychoeducation on stress, negative patterns of behavior, and positive/negative coping strategies was provided. Stressors were defined and examples categories were discussed. Patients were encouraged to share recent examples of stress triggers, of positive and negative coping strategies, and to discuss their own responses to stressors. Patients engaged in positive coping exercises (i.e. paced breathing, physical activity, & mindfulness) and subsequently discussed their experience. Patients were encouraged to continue practicing positive coping skills for discussion at next group session.
Patients were encouraged to discuss concerns about the near future (e.g., post-discharge) and strategies or plans about managing anticipated difficulties. Emphasis was on identifying strengths, coping, offering support and generating alternatives for problems and challenges. Group members discussed anticipated stresses including medical treatments, socialization and other relationship difficulties. The topics of increased responsibility and the challenges of greater independence were explored. Group members were able to listen to each other and receive encouragement, validation and acceptance.

## 2022-08-12 NOTE — BH PSYCHOLOGY - GROUP THERAPY NOTE - TOKEN PULL-DIAGNOSIS
Primary Diagnosis:  Schizophrenia [F20.9]      Paranoid schizophrenia [F20.0]        Problem Dx:   Poor dental hygiene [Z91.89]      Medical non-compliance [Z91.19]      Tardive dyskinesia [G24.01]      
Primary Diagnosis:  Schizophrenia [F20.9]      Paranoid schizophrenia [F20.0]        Problem Dx:   Poor dental hygiene [Z91.89]      Medical non-compliance [Z91.19]      Tardive dyskinesia [G24.01]

## 2022-08-13 RX ADMIN — HALOPERIDOL DECANOATE 15 MILLIGRAM(S): 100 INJECTION INTRAMUSCULAR at 20:17

## 2022-08-13 RX ADMIN — CHLORHEXIDINE GLUCONATE 15 MILLILITER(S): 213 SOLUTION TOPICAL at 20:18

## 2022-08-13 RX ADMIN — HALOPERIDOL DECANOATE 15 MILLIGRAM(S): 100 INJECTION INTRAMUSCULAR at 09:21

## 2022-08-13 RX ADMIN — CHLORHEXIDINE GLUCONATE 15 MILLILITER(S): 213 SOLUTION TOPICAL at 09:22

## 2022-08-14 RX ADMIN — CHLORHEXIDINE GLUCONATE 15 MILLILITER(S): 213 SOLUTION TOPICAL at 08:37

## 2022-08-14 RX ADMIN — HALOPERIDOL DECANOATE 15 MILLIGRAM(S): 100 INJECTION INTRAMUSCULAR at 08:36

## 2022-08-14 RX ADMIN — CHLORHEXIDINE GLUCONATE 15 MILLILITER(S): 213 SOLUTION TOPICAL at 20:16

## 2022-08-14 RX ADMIN — HALOPERIDOL DECANOATE 15 MILLIGRAM(S): 100 INJECTION INTRAMUSCULAR at 20:16

## 2022-08-15 PROCEDURE — 99232 SBSQ HOSP IP/OBS MODERATE 35: CPT

## 2022-08-15 RX ADMIN — HALOPERIDOL DECANOATE 15 MILLIGRAM(S): 100 INJECTION INTRAMUSCULAR at 22:15

## 2022-08-15 RX ADMIN — CHLORHEXIDINE GLUCONATE 15 MILLILITER(S): 213 SOLUTION TOPICAL at 20:27

## 2022-08-15 RX ADMIN — HALOPERIDOL DECANOATE 15 MILLIGRAM(S): 100 INJECTION INTRAMUSCULAR at 10:00

## 2022-08-15 RX ADMIN — CHLORHEXIDINE GLUCONATE 15 MILLILITER(S): 213 SOLUTION TOPICAL at 10:00

## 2022-08-15 NOTE — BH INPATIENT PSYCHIATRY PROGRESS NOTE - NSBHFUPINTERVALHXFT_PSY_A_CORE
Chart reviewed and case discussed with treatment team. Staff report    Chart reviewed and case discussed with treatment team. Staff report patient has been calm though withdrawn and keeps to herself. She continues to blame her landlord for their conflict and subsequent hospitalization. She remains illogical and disorganized.

## 2022-08-15 NOTE — BH INPATIENT PSYCHIATRY PROGRESS NOTE - NSBHMETABOLIC_PSY_ALL_CORE_FT
BMI: BMI (kg/m2): 20.2 (08-13-22 @ 15:51)  HbA1c: A1C with Estimated Average Glucose Result: 5.9 % (06-19-22 @ 08:45)    Glucose:   BP: --  Lipid Panel: Date/Time: 06-19-22 @ 08:45  Cholesterol, Serum: 165  Direct LDL: --  HDL Cholesterol, Serum: 64  Total Cholesterol/HDL Ration Measurement: --  Triglycerides, Serum: 58

## 2022-08-15 NOTE — BH INPATIENT PSYCHIATRY PROGRESS NOTE - NSBHASSESSSUMMFT_PSY_ALL_CORE
72 year old  woman, domiciled with daughter, hx of chronic paranoid schizophrenia with multiple prior psychiatric hospitalizations and long standing hx of medication nonadherence, no hx of suicide attempts or suicidality, BIB EMS activated by patient’s daughter due to confrontational behavior towards landlord due to paranoia towards him. She has had incidents like this in the past, also including paranoia towards her neighbors.  Daughter reports patient's behavior has been very concerning due to inability to care for self (severe weight loss) and confrontational behavior that gets her into trouble with people around her. TOO obtained - has been med compliant. Patient remains paranoid, guarded, disorganized, uncooperative with assessments and aftercare planning but has had no outbursts. Meds being titrated while AOT/ACT being pursued but recently showed agranulocytosis with risperidone. She was switched to haldol and ANC is being trended with gradual improvement.     Plan:    1) Disposition:   - continue inpatient care for now due to psychosis  - d/c to home on AOT/ACT  2) Legal status: 9.27   3) Psychotropic medications:  - continue haldol 15mg po bid - will plan for RAYO once there is sustained improvement in ANC; check CBC on 8/15/22  - will consider acquiring ingrezza for patient's TD though it has been improving since admission  - on TOO - haldol IM if patient refuses PO  4) Non-pharmacologic interventions:  - individual, group, milieu therapy as appropriate  5) Medical comorbidities:  - no acute needs other than gait instability  6) Work up:  - repeat CBC tomorrow  7) Social issues: patient not allowing care coordination with family or to obtain records from other providers; will refer to AOT which may allow treatment team obtain records; ACT application submitted  8) Psychoeducation: Risks and benefits discussed with patient, psychoeducation provided but patient still has poor insight and judgement  72 year old  woman, domiciled with daughter, hx of chronic paranoid schizophrenia with multiple prior psychiatric hospitalizations and long standing hx of medication nonadherence, no hx of suicide attempts or suicidality, BIB EMS activated by patient’s daughter due to confrontational behavior towards landlord due to paranoia towards him. She has had incidents like this in the past, also including paranoia towards her neighbors.  Daughter reports patient's behavior has been very concerning due to inability to care for self (severe weight loss) and confrontational behavior that gets her into trouble with people around her. TOO obtained - has been med compliant. Patient remains paranoid, guarded, disorganized, uncooperative with assessments and aftercare planning but has had no outbursts. Meds being titrated while AOT/ACT being pursued but recently showed agranulocytosis with risperidone. She was switched to haldol and ANC is being trended with gradual improvement.     Plan:    1) Disposition:   - continue inpatient care for now due to psychosis  - d/c to home on AOT/ACT  2) Legal status: 9.27   3) Psychotropic medications:  - continue haldol 15mg po bid - will plan for RAYO once there is sustained improvement in ANC;  - will consider acquiring ingrezza for patient's TD though it has been improving since admission  - on TOO - haldol IM if patient refuses PO  4) Non-pharmacologic interventions:  - individual, group, milieu therapy as appropriate  5) Medical comorbidities:  - no acute needs other than gait instability  6) Work up:  - repeat CBC tomorrow  7) Social issues: patient not allowing care coordination with family or to obtain records from other providers; will refer to AOT which may allow treatment team obtain records; ACT application submitted  8) Psychoeducation: Risks and benefits discussed with patient, psychoeducation provided but patient still has poor insight and judgement

## 2022-08-15 NOTE — BH INPATIENT PSYCHIATRY PROGRESS NOTE - MSE UNSTRUCTURED FT
Appearance: thin habitus, better oral hygiene; No significant TD noted today; stable gait  Behavior: no psychomotor agitation or retardation but frequently staff seeking today  Speech: soft spoken, more spontaneous given her increased anxiety, poor articulation as usual   Mood: worried   Affect: congruent, intense, stable  Thought process: illogical, concrete, perseverative   Thought content: paranoid delusions noted, no reports of SI  Perceptual disturbances: no appearance of internal preoccupation  Orientation: did not answer questions about orientation today  Abstraction: concrete  Fund of knowledge: limited  Insight: poor  Judgement: poor     Appearance: thin habitus, poor oral hygiene; No significant TD noted today though fingers are hyperflexed; stable gait  Behavior: no psychomotor agitation or retardation; withdrawn  Speech: low volume, normal rate, fluent; difficult to hear due to poor articulation  Mood: denies complaints  Affect: somewhat dysphoric, constricted, stable  Thought process: illogical, concrete, perseverative   Thought content: paranoid delusions remain, no reports of SI  Perceptual disturbances: no appearance of internal preoccupation  Orientation: adequately oriented  Abstraction: concrete  Fund of knowledge: limited  Insight: poor  Judgement: poor

## 2022-08-15 NOTE — BH INPATIENT PSYCHIATRY PROGRESS NOTE - NSBHCHARTREVIEWVS_PSY_A_CORE FT
Vital Signs Last 24 Hrs  T(C): 36.3 (08-15-22 @ 15:37), Max: 36.4 (08-15-22 @ 08:16)  T(F): 97.3 (08-15-22 @ 15:37), Max: 97.5 (08-15-22 @ 08:16)  HR: --  BP: --  BP(mean): --  RR: --  SpO2: --    Orthostatic VS  08-15-22 @ 08:16  Lying BP: --/-- HR: --  Sitting BP: 116/66 HR: 79  Standing BP: 112/66 HR: 76  Site: --  Mode: --  Orthostatic VS  08-14-22 @ 07:58  Lying BP: --/-- HR: --  Sitting BP: 135/62 HR: 77  Standing BP: 136/78 HR: 81  Site: upper right arm  Mode: electronic

## 2022-08-16 LAB
BASOPHILS # BLD AUTO: 0.05 K/UL — SIGNIFICANT CHANGE UP (ref 0–0.2)
BASOPHILS NFR BLD AUTO: 1.4 % — SIGNIFICANT CHANGE UP (ref 0–2)
EOSINOPHIL # BLD AUTO: 0.08 K/UL — SIGNIFICANT CHANGE UP (ref 0–0.5)
EOSINOPHIL NFR BLD AUTO: 2.2 % — SIGNIFICANT CHANGE UP (ref 0–6)
HCT VFR BLD CALC: 39.7 % — SIGNIFICANT CHANGE UP (ref 34.5–45)
HGB BLD-MCNC: 12.2 G/DL — SIGNIFICANT CHANGE UP (ref 11.5–15.5)
IANC: 1.76 K/UL — LOW (ref 1.8–7.4)
IMM GRANULOCYTES NFR BLD AUTO: 0 % — SIGNIFICANT CHANGE UP (ref 0–1.5)
LYMPHOCYTES # BLD AUTO: 1.36 K/UL — SIGNIFICANT CHANGE UP (ref 1–3.3)
LYMPHOCYTES # BLD AUTO: 37.2 % — SIGNIFICANT CHANGE UP (ref 13–44)
MCHC RBC-ENTMCNC: 28.4 PG — SIGNIFICANT CHANGE UP (ref 27–34)
MCHC RBC-ENTMCNC: 30.7 GM/DL — LOW (ref 32–36)
MCV RBC AUTO: 92.3 FL — SIGNIFICANT CHANGE UP (ref 80–100)
MONOCYTES # BLD AUTO: 0.41 K/UL — SIGNIFICANT CHANGE UP (ref 0–0.9)
MONOCYTES NFR BLD AUTO: 11.2 % — SIGNIFICANT CHANGE UP (ref 2–14)
NEUTROPHILS # BLD AUTO: 1.76 K/UL — LOW (ref 1.8–7.4)
NEUTROPHILS NFR BLD AUTO: 48 % — SIGNIFICANT CHANGE UP (ref 43–77)
NRBC # BLD: 0 /100 WBCS — SIGNIFICANT CHANGE UP (ref 0–0)
NRBC # FLD: 0 K/UL — SIGNIFICANT CHANGE UP (ref 0–0)
PLATELET # BLD AUTO: 227 K/UL — SIGNIFICANT CHANGE UP (ref 150–400)
RBC # BLD: 4.3 M/UL — SIGNIFICANT CHANGE UP (ref 3.8–5.2)
RBC # FLD: 13.6 % — SIGNIFICANT CHANGE UP (ref 10.3–14.5)
WBC # BLD: 3.66 K/UL — LOW (ref 3.8–10.5)
WBC # FLD AUTO: 3.66 K/UL — LOW (ref 3.8–10.5)

## 2022-08-16 PROCEDURE — 99232 SBSQ HOSP IP/OBS MODERATE 35: CPT

## 2022-08-16 RX ADMIN — CHLORHEXIDINE GLUCONATE 15 MILLILITER(S): 213 SOLUTION TOPICAL at 09:11

## 2022-08-16 RX ADMIN — CHLORHEXIDINE GLUCONATE 15 MILLILITER(S): 213 SOLUTION TOPICAL at 21:14

## 2022-08-16 RX ADMIN — HALOPERIDOL DECANOATE 15 MILLIGRAM(S): 100 INJECTION INTRAMUSCULAR at 21:14

## 2022-08-16 RX ADMIN — HALOPERIDOL DECANOATE 15 MILLIGRAM(S): 100 INJECTION INTRAMUSCULAR at 09:10

## 2022-08-16 NOTE — BH INPATIENT PSYCHIATRY PROGRESS NOTE - NSBHFUPINTERVALHXFT_PSY_A_CORE
Chart reviewed and case discussed with treatment team. Staff report patient has been calm, withdrawn. Patient remains paranoid and disorganized but is without disruptive behavior. She is noted to be falling asleep - when writer offered to adjust her medications, she declined and even denied that she was dozing off. Denies adverse effects.

## 2022-08-16 NOTE — BH INPATIENT PSYCHIATRY PROGRESS NOTE - NSBHASSESSSUMMFT_PSY_ALL_CORE
72 year old  woman, domiciled with daughter, hx of chronic paranoid schizophrenia with multiple prior psychiatric hospitalizations and long standing hx of medication nonadherence, no hx of suicide attempts or suicidality, BIB EMS activated by patient’s daughter due to confrontational behavior towards landlord due to paranoia towards him. She has had incidents like this in the past, also including paranoia towards her neighbors.  Daughter reports patient's behavior has been very concerning due to inability to care for self (severe weight loss) and confrontational behavior that gets her into trouble with people around her. TOO obtained - has been med compliant. Patient remains paranoid, guarded, disorganized, uncooperative with assessments and aftercare planning but has had no outbursts. Meds being titrated while AOT/ACT being pursued but recently showed agranulocytosis with risperidone. She was switched to haldol and ANC is being trended with gradual but slow improvement.     Plan:    1) Disposition:   - continue inpatient care for now due to psychosis  - d/c to home on AOT/ACT - will need to finalize medication regimen before getting court date for AOT  2) Legal status: 9.27   3) Psychotropic medications:  - continue haldol 15mg po bid - will plan for RAYO once there is sustained improvement in ANC;  - will consider acquiring ingrezza for patient's TD though it has been improving since admission  - on TOO - haldol IM if patient refuses PO  4) Non-pharmacologic interventions:  - individual, group, milieu therapy as appropriate  5) Medical comorbidities:  - no acute needs other than gait instability  6) Work up:  - repeat CBC in 2 days  7) Social issues: patient not allowing care coordination with family or to obtain records from other providers; will refer to AOT which may allow treatment team obtain records; ACT application submitted  8) Psychoeducation: Risks and benefits discussed with patient, psychoeducation provided but patient still has poor insight and judgement  72 year old  woman, domiciled with daughter, hx of chronic paranoid schizophrenia with multiple prior psychiatric hospitalizations and long standing hx of medication nonadherence, no hx of suicide attempts or suicidality, BIB EMS activated by patient’s daughter due to confrontational behavior towards landlord due to paranoia towards him. She has had incidents like this in the past, also including paranoia towards her neighbors.  Daughter reports patient's behavior has been very concerning due to inability to care for self (severe weight loss) and confrontational behavior that gets her into trouble with people around her. TOO obtained - has been med compliant. Patient remains paranoid, guarded, disorganized, uncooperative with assessments and aftercare planning but has had no outbursts. Meds being titrated while AOT/ACT being pursued but recently showed agranulocytosis with risperidone. She was switched to haldol and ANC is being trended with gradual but slow improvement.     Plan:    1) Disposition:   - continue inpatient care for now due to psychosis  - d/c to home on AOT/ACT - will need to finalize medication regimen before getting court date for AOT  2) Legal status: 9.27   3) Psychotropic medications:  - continue haldol 15mg po bid - will plan for RAYO once there is sustained improvement in ANC;  - will consider acquiring ingrezza for patient's TD though it has been improving since admission  - on TOO - haldol IM if patient refuses PO  4) Non-pharmacologic interventions:  - individual, group, milieu therapy as appropriate  5) Medical comorbidities:  - no acute needs other than gait instability  6) Work up:  - repeat CBC in 2 days to track ANC; per initial discussion with hematology, ANC may take up to 12 days to recover; will request formal consult if ANC remains abnormal for longer than that  7) Social issues: patient not allowing care coordination with family or to obtain records from other providers; will refer to AOT which may allow treatment team obtain records; ACT application submitted  8) Psychoeducation: Risks and benefits discussed with patient, psychoeducation provided but patient still has poor insight and judgement

## 2022-08-16 NOTE — BH INPATIENT PSYCHIATRY PROGRESS NOTE - MSE UNSTRUCTURED FT
Appearance: appears stated age, thin habitus, limited oral hygiene; No significant TD noted; gait is steady  Behavior: superficial, guarded, no psychomotor agitation or retardation  Speech: low volume, normal rate, fluent, poor articulation  Mood: denies complaints  Affect: dysphoric, constricted, stable  Thought process: illogical at times, concrete  Thought content: paranoid delusions remain, no reports of SI  Perceptual disturbances: no appearance of internal preoccupation  Orientation: difficult to assess as patient refers to the date written on the wall in front of her  Abstraction: concrete  Fund of knowledge: limited  Insight: poor  Judgement: poor

## 2022-08-16 NOTE — BH INPATIENT PSYCHIATRY PROGRESS NOTE - NSBHCHARTREVIEWVS_PSY_A_CORE FT
Vital Signs Last 24 Hrs  T(C): 36.9 (08-16-22 @ 07:53), Max: 36.9 (08-16-22 @ 07:53)  T(F): 98.4 (08-16-22 @ 07:53), Max: 98.4 (08-16-22 @ 07:53)  HR: --  BP: --  BP(mean): --  RR: --  SpO2: --    Orthostatic VS  08-16-22 @ 07:53  Lying BP: --/-- HR: --  Sitting BP: 134/70 HR: 69  Standing BP: 122/62 HR: 81  Site: upper left arm  Mode: electronic  Orthostatic VS  08-15-22 @ 08:16  Lying BP: --/-- HR: --  Sitting BP: 116/66 HR: 79  Standing BP: 112/66 HR: 76  Site: --  Mode: --

## 2022-08-17 PROCEDURE — 99232 SBSQ HOSP IP/OBS MODERATE 35: CPT

## 2022-08-17 RX ADMIN — HALOPERIDOL DECANOATE 15 MILLIGRAM(S): 100 INJECTION INTRAMUSCULAR at 20:12

## 2022-08-17 RX ADMIN — CHLORHEXIDINE GLUCONATE 15 MILLILITER(S): 213 SOLUTION TOPICAL at 09:08

## 2022-08-17 RX ADMIN — CHLORHEXIDINE GLUCONATE 15 MILLILITER(S): 213 SOLUTION TOPICAL at 20:11

## 2022-08-17 RX ADMIN — HALOPERIDOL DECANOATE 15 MILLIGRAM(S): 100 INJECTION INTRAMUSCULAR at 09:09

## 2022-08-17 NOTE — BH INPATIENT PSYCHIATRY PROGRESS NOTE - NSBHFUPINTERVALHXFT_PSY_A_CORE
Chart reviewed and case discussed with treatment team. Staff report patient has been calm, cooperative but withdrawn. Patient has some disorganized thought process and maintains that her landlord's bad behavior is at fault for their conflict. She states she plans on moving to a different apartment with her daughter in the near future.

## 2022-08-17 NOTE — BH INPATIENT PSYCHIATRY PROGRESS NOTE - NSBHASSESSSUMMFT_PSY_ALL_CORE
72 year old  woman, domiciled with daughter, hx of chronic paranoid schizophrenia with multiple prior psychiatric hospitalizations and long standing hx of medication nonadherence, no hx of suicide attempts or suicidality, BIB EMS activated by patient’s daughter due to confrontational behavior towards landlord due to paranoia towards him. She has had incidents like this in the past, also including paranoia towards her neighbors.  Daughter reports patient's behavior has been very concerning due to inability to care for self (severe weight loss) and confrontational behavior that gets her into trouble with people around her. TOO obtained - has been med compliant. Patient remains paranoid, guarded, disorganized, uncooperative with assessments and aftercare planning but has had no outbursts. Meds being titrated while AOT/ACT being pursued but recently showed agranulocytosis with risperidone. She was switched to haldol and ANC is being trended with gradual but slow improvement.     Plan:    1) Disposition:   - continue inpatient care for now due to psychosis  - d/c to home on AOT/ACT - will need to finalize medication regimen before getting court date for AOT  2) Legal status: 9.27   3) Psychotropic medications:  - continue haldol 15mg po bid - will plan for RAYO once there is sustained improvement in ANC;  - will consider acquiring ingrezza for patient's TD though it has been improving since admission  - on TOO - haldol IM if patient refuses PO  4) Non-pharmacologic interventions:  - individual, group, milieu therapy as appropriate  5) Medical comorbidities:  - no acute needs other than gait instability  6) Work up:  - repeat CBC tomorrow to track ANC; per initial discussion with hematology, ANC may take up to 12 days to recover; will request formal consult if ANC remains abnormal for longer than that  7) Social issues: patient not allowing care coordination with family or to obtain records from other providers; will refer to AOT which may allow treatment team obtain records; ACT application submitted  8) Psychoeducation: Risks and benefits discussed with patient, psychoeducation provided but patient still has poor insight and judgement

## 2022-08-17 NOTE — BH INPATIENT PSYCHIATRY PROGRESS NOTE - MSE UNSTRUCTURED FT
Appearance: uneven but stable gait; appears stated age, thin habitus, limited oral hygiene  Behavior: superficial, guarded, no psychomotor agitation or retardation  Speech: low volume, normal rate, fluent, poor articulation  Mood: denies complaints  Affect: dysphoric, constricted, stable  Thought process: illogical and inconsistent at times, concrete  Thought content: paranoid delusions remain, no reports of SI  Perceptual disturbances: no appearance of internal preoccupation  Orientation: knows month, year but not day of week or date; knows name of hospital  Abstraction: concrete  Fund of knowledge: limited  Insight: poor  Judgement: poor

## 2022-08-17 NOTE — BH INPATIENT PSYCHIATRY PROGRESS NOTE - NSBHCHARTREVIEWVS_PSY_A_CORE FT
Vital Signs Last 24 Hrs  T(C): 36.4 (08-17-22 @ 15:34), Max: 36.4 (08-17-22 @ 08:11)  T(F): 97.5 (08-17-22 @ 15:34), Max: 97.6 (08-17-22 @ 08:11)  HR: --  BP: --  BP(mean): --  RR: --  SpO2: --    Orthostatic VS  08-17-22 @ 08:11  Lying BP: --/-- HR: --  Sitting BP: 109/64 HR: 63  Standing BP: 115/73 HR: 81  Site: --  Mode: --  Orthostatic VS  08-16-22 @ 07:53  Lying BP: --/-- HR: --  Sitting BP: 134/70 HR: 69  Standing BP: 122/62 HR: 81  Site: upper left arm  Mode: electronic

## 2022-08-18 LAB
BASOPHILS # BLD AUTO: 0.05 K/UL — SIGNIFICANT CHANGE UP (ref 0–0.2)
BASOPHILS NFR BLD AUTO: 1.3 % — SIGNIFICANT CHANGE UP (ref 0–2)
EOSINOPHIL # BLD AUTO: 0.07 K/UL — SIGNIFICANT CHANGE UP (ref 0–0.5)
EOSINOPHIL NFR BLD AUTO: 1.8 % — SIGNIFICANT CHANGE UP (ref 0–6)
HCT VFR BLD CALC: 41.4 % — SIGNIFICANT CHANGE UP (ref 34.5–45)
HGB BLD-MCNC: 12.6 G/DL — SIGNIFICANT CHANGE UP (ref 11.5–15.5)
IANC: 1.75 K/UL — LOW (ref 1.8–7.4)
IMM GRANULOCYTES NFR BLD AUTO: 0.3 % — SIGNIFICANT CHANGE UP (ref 0–1.5)
LYMPHOCYTES # BLD AUTO: 1.6 K/UL — SIGNIFICANT CHANGE UP (ref 1–3.3)
LYMPHOCYTES # BLD AUTO: 40.9 % — SIGNIFICANT CHANGE UP (ref 13–44)
MCHC RBC-ENTMCNC: 28.6 PG — SIGNIFICANT CHANGE UP (ref 27–34)
MCHC RBC-ENTMCNC: 30.4 GM/DL — LOW (ref 32–36)
MCV RBC AUTO: 94.1 FL — SIGNIFICANT CHANGE UP (ref 80–100)
MONOCYTES # BLD AUTO: 0.43 K/UL — SIGNIFICANT CHANGE UP (ref 0–0.9)
MONOCYTES NFR BLD AUTO: 11 % — SIGNIFICANT CHANGE UP (ref 2–14)
NEUTROPHILS # BLD AUTO: 1.75 K/UL — LOW (ref 1.8–7.4)
NEUTROPHILS NFR BLD AUTO: 44.7 % — SIGNIFICANT CHANGE UP (ref 43–77)
NRBC # BLD: 0 /100 WBCS — SIGNIFICANT CHANGE UP (ref 0–0)
NRBC # FLD: 0 K/UL — SIGNIFICANT CHANGE UP (ref 0–0)
PLATELET # BLD AUTO: 231 K/UL — SIGNIFICANT CHANGE UP (ref 150–400)
RBC # BLD: 4.4 M/UL — SIGNIFICANT CHANGE UP (ref 3.8–5.2)
RBC # FLD: 13.6 % — SIGNIFICANT CHANGE UP (ref 10.3–14.5)
WBC # BLD: 3.91 K/UL — SIGNIFICANT CHANGE UP (ref 3.8–10.5)
WBC # FLD AUTO: 3.91 K/UL — SIGNIFICANT CHANGE UP (ref 3.8–10.5)

## 2022-08-18 PROCEDURE — 99232 SBSQ HOSP IP/OBS MODERATE 35: CPT

## 2022-08-18 RX ORDER — HALOPERIDOL DECANOATE 100 MG/ML
10 INJECTION INTRAMUSCULAR
Refills: 0 | Status: DISCONTINUED | OUTPATIENT
Start: 2022-08-18 | End: 2022-09-19

## 2022-08-18 RX ADMIN — HALOPERIDOL DECANOATE 10 MILLIGRAM(S): 100 INJECTION INTRAMUSCULAR at 20:31

## 2022-08-18 RX ADMIN — CHLORHEXIDINE GLUCONATE 15 MILLILITER(S): 213 SOLUTION TOPICAL at 20:30

## 2022-08-18 RX ADMIN — HALOPERIDOL DECANOATE 15 MILLIGRAM(S): 100 INJECTION INTRAMUSCULAR at 09:31

## 2022-08-18 RX ADMIN — CHLORHEXIDINE GLUCONATE 15 MILLILITER(S): 213 SOLUTION TOPICAL at 10:02

## 2022-08-18 NOTE — BH INPATIENT PSYCHIATRY PROGRESS NOTE - NSBHCHARTREVIEWVS_PSY_A_CORE FT
Vital Signs Last 24 Hrs  T(C): 37.1 (08-18-22 @ 15:50), Max: 37.1 (08-18-22 @ 15:50)  T(F): 98.7 (08-18-22 @ 15:50), Max: 98.7 (08-18-22 @ 15:50)  HR: --  BP: --  BP(mean): --  RR: 18 (08-18-22 @ 05:02) (18 - 18)  SpO2: --    Orthostatic VS  08-18-22 @ 05:02  Lying BP: 112/68 HR: 62  Sitting BP: 110/65 HR: 72  Standing BP: --/-- HR: --  Site: upper right arm  Mode: electronic  Orthostatic VS  08-17-22 @ 08:11  Lying BP: --/-- HR: --  Sitting BP: 109/64 HR: 63  Standing BP: 115/73 HR: 81  Site: --  Mode: --

## 2022-08-18 NOTE — BH INPATIENT PSYCHIATRY PROGRESS NOTE - NSBHASSESSSUMMFT_PSY_ALL_CORE
72 year old  woman, domiciled with daughter, hx of chronic paranoid schizophrenia with multiple prior psychiatric hospitalizations and long standing hx of medication nonadherence, no hx of suicide attempts or suicidality, BIB EMS activated by patient’s daughter due to confrontational behavior towards landlord due to paranoia towards him. She has had incidents like this in the past, also including paranoia towards her neighbors.  Daughter reports patient's behavior has been very concerning due to inability to care for self (severe weight loss) and confrontational behavior that gets her into trouble with people around her. TOO obtained - has been med compliant. Patient remains paranoid, guarded, disorganized, uncooperative with assessments and aftercare planning but has had no outbursts. Meds being titrated while AOT/ACT being pursued but recently showed agranulocytosis with risperidone. She was switched to haldol and ANC is being trended with gradual but slow improvement.     Plan:    1) Disposition:   - continue inpatient care for now due to psychosis  - d/c to home on AOT/ACT - will need to finalize medication regimen before getting court date for AOT  2) Legal status: 9.27   3) Psychotropic medications:  - continue haldol 15mg po bid - will plan for RAYO once there is sustained improvement in ANC;  - will consider acquiring ingrezza for patient's TD though it has been improving since admission  - on TOO - haldol IM if patient refuses PO  4) Non-pharmacologic interventions:  - individual, group, milieu therapy as appropriate  5) Medical comorbidities:  - no acute needs other than gait instability  6) Work up:  - repeat CBC to track ANC - next check on 8/21/22; per initial discussion with hematology, ANC may take up to 12 days to recover; will request formal consult if ANC remains abnormal for longer than that  7) Social issues: patient not allowing care coordination with family or to obtain records from other providers; will refer to AOT which may allow treatment team obtain records; ACT application submitted  8) Psychoeducation: Risks and benefits discussed with patient, psychoeducation provided but patient still has poor insight and judgement

## 2022-08-18 NOTE — BH INPATIENT PSYCHIATRY PROGRESS NOTE - MSE UNSTRUCTURED FT
Appearance: uneven but stable gait; limited oral hygiene; dressed in hospital gown  Behavior: guarded, mild psychomotor retardation  Speech: low volume, normal rate, fluent, poor articulation  Mood: denies complaints  Affect: dysphoric, constricted, stable  Thought process: illogical and inconsistent, concrete  Thought content: paranoid delusions remain, no reports of SI  Perceptual disturbances: no appearance of internal preoccupation  Orientation: knows month, year but not day of week or date; knows name of hospital  Abstraction: concrete  Fund of knowledge: limited  Insight: poor  Judgement: poor

## 2022-08-18 NOTE — BH INPATIENT PSYCHIATRY PROGRESS NOTE - NSBHFUPINTERVALHXFT_PSY_A_CORE
Chart reviewed and case discussed with treatment team. Staff report patient has been calm and med compliant though oddly related, guarded and isolative. Patient continues to refuse signing consent for release of information. Updated on ANC value plateauing and not improving adequately. Patient did not have any emotional reaction or comments regarding this. Remains focused on discharge.

## 2022-08-19 PROCEDURE — 99232 SBSQ HOSP IP/OBS MODERATE 35: CPT

## 2022-08-19 RX ADMIN — HALOPERIDOL DECANOATE 10 MILLIGRAM(S): 100 INJECTION INTRAMUSCULAR at 20:14

## 2022-08-19 RX ADMIN — CHLORHEXIDINE GLUCONATE 15 MILLILITER(S): 213 SOLUTION TOPICAL at 09:12

## 2022-08-19 RX ADMIN — HALOPERIDOL DECANOATE 10 MILLIGRAM(S): 100 INJECTION INTRAMUSCULAR at 09:11

## 2022-08-19 RX ADMIN — CHLORHEXIDINE GLUCONATE 15 MILLILITER(S): 213 SOLUTION TOPICAL at 20:14

## 2022-08-19 NOTE — BH INPATIENT PSYCHIATRY PROGRESS NOTE - NSBHCHARTREVIEWVS_PSY_A_CORE FT
Vital Signs Last 24 Hrs  T(C): 36.3 (08-19-22 @ 08:18), Max: 37.1 (08-18-22 @ 15:50)  T(F): 97.4 (08-19-22 @ 08:18), Max: 98.7 (08-18-22 @ 15:50)  HR: --  BP: --  BP(mean): --  RR: --  SpO2: --    Orthostatic VS  08-19-22 @ 08:18  Lying BP: --/-- HR: --  Sitting BP: 124/83 HR: 93  Standing BP: 125/76 HR: 87  Site: --  Mode: --  Orthostatic VS  08-18-22 @ 05:02  Lying BP: 112/68 HR: 62  Sitting BP: 110/65 HR: 72  Standing BP: --/-- HR: --  Site: upper right arm  Mode: electronic

## 2022-08-19 NOTE — BH INPATIENT PSYCHIATRY PROGRESS NOTE - NSBHFUPINTERVALHXFT_PSY_A_CORE
Chart reviewed and case discussed with treatment team. Staff report patient continues to keep to herself but is otherwise cooperative with care. Patient was sleeping in the chair in the dayroom on approach - when writer noted this, she denied it and said she was resting. Patient is concrete, seems to overcompensate by stating everything is fine, even with side effects. She reports her mood is "very good" despite the obvious fact that she is frustrated with being here. Writer confronted her again about her behavior contributing to her length of stay. Patient initially claimed that she has done everything we've asked of her. Writer redirected her and reminded her that she has refused to allow treatment collaboration with family and denied consent to obtain medical records. Patient finally agreed to sign consent to obtain medical records from other hospitals in order to apply for AOT/ACT. Patient is disorganized and seems to think this will result in people coming into her house. Though paranoia about landlord persists, she is less preoccupied. She does state she plans on moving with her daughter.

## 2022-08-19 NOTE — BH INPATIENT PSYCHIATRY PROGRESS NOTE - MSE UNSTRUCTURED FT
Appearance: uneven but stable gait; limited oral hygiene; dressed in hospital gown; no tremors but there is odd posturing with her arms that is not new  Behavior: oddly related, guarded, drowsy on approach  Speech: low volume, normal rate, fluent, poor articulation  Mood: "very good"  Affect: dysphoric, constricted, stable  Thought process: illogical, inconsistent, concrete  Thought content: paranoid delusions remain but now allowing team to obtain records, no reports of SI  Perceptual disturbances: no appearance of internal preoccupation  Orientation: knows month, year but not day of week or date; knows name of hospital  Abstraction: concrete  Fund of knowledge: limited  Insight: poor  Judgement: somewhat improving

## 2022-08-19 NOTE — BH INPATIENT PSYCHIATRY PROGRESS NOTE - NSBHASSESSSUMMFT_PSY_ALL_CORE
72 year old  woman, domiciled with daughter, hx of chronic paranoid schizophrenia with multiple prior psychiatric hospitalizations and long standing hx of medication nonadherence, no hx of suicide attempts or suicidality, BIB EMS activated by patient’s daughter due to confrontational behavior towards landlord due to paranoia towards him. She has had incidents like this in the past, also including paranoia towards her neighbors.  Daughter reports patient's behavior has been very concerning due to inability to care for self (severe weight loss) and confrontational behavior that gets her into trouble with people around her. TOO obtained - has been med compliant. Patient has had partial response to medications - has chronic paranoia about landlord, is guarded and illogical at times but more cooperative with assessments and aftercare planning. Has had no agitation. AOT/ACT being pursued but recently medications cannot yet be finalized due to aranulocytosis with risperidone. She was switched to haldol and ANC is being trended with gradual but slow improvement.     Plan:    1) Disposition:   - continue inpatient care - needs enhanced aftercare planning in order for patient to be accepted back to live with family while having residual psychosis  - d/c to home once AOT/ACT established- will need to finalize medication regimen before getting court date for AOT  2) Legal status: 9.27   3) Psychotropic medications:  - decreased haldol to 10mg po bid due to drowsiness and concerns about ANC not recovering fully - will plan for RAYO once there is sustained improvement in ANC;  - will consider acquiring ingrezza for patient's TD though it has been improving since admission  - on TOO - haldol IM if patient refuses PO  4) Non-pharmacologic interventions:  - individual, group, milieu therapy as appropriate  5) Medical comorbidities:  - no acute needs other than gait instability  6) Work up:  - repeat CBC to track ANC - next check on 8/21/22; per initial discussion with hematology, ANC may take up to 12 days to recover; will request formal consult if ANC remains abnormal for longer than that  7) Social issues: patient not allowing care coordination with family but now consented to obtain records from other providers  8) Psychoeducation: Risks and benefits discussed with patient, psychoeducation provided but patient still has poor insight and judgement

## 2022-08-20 RX ADMIN — CHLORHEXIDINE GLUCONATE 15 MILLILITER(S): 213 SOLUTION TOPICAL at 09:26

## 2022-08-20 RX ADMIN — HALOPERIDOL DECANOATE 10 MILLIGRAM(S): 100 INJECTION INTRAMUSCULAR at 21:15

## 2022-08-20 RX ADMIN — HALOPERIDOL DECANOATE 10 MILLIGRAM(S): 100 INJECTION INTRAMUSCULAR at 09:26

## 2022-08-20 RX ADMIN — CHLORHEXIDINE GLUCONATE 15 MILLILITER(S): 213 SOLUTION TOPICAL at 21:15

## 2022-08-21 RX ADMIN — HALOPERIDOL DECANOATE 10 MILLIGRAM(S): 100 INJECTION INTRAMUSCULAR at 09:13

## 2022-08-21 RX ADMIN — HALOPERIDOL DECANOATE 10 MILLIGRAM(S): 100 INJECTION INTRAMUSCULAR at 20:19

## 2022-08-21 RX ADMIN — CHLORHEXIDINE GLUCONATE 15 MILLILITER(S): 213 SOLUTION TOPICAL at 09:13

## 2022-08-21 RX ADMIN — CHLORHEXIDINE GLUCONATE 15 MILLILITER(S): 213 SOLUTION TOPICAL at 20:19

## 2022-08-22 LAB
BASOPHILS # BLD AUTO: 0.05 K/UL — SIGNIFICANT CHANGE UP (ref 0–0.2)
BASOPHILS NFR BLD AUTO: 1.1 % — SIGNIFICANT CHANGE UP (ref 0–2)
EOSINOPHIL # BLD AUTO: 0.06 K/UL — SIGNIFICANT CHANGE UP (ref 0–0.5)
EOSINOPHIL NFR BLD AUTO: 1.4 % — SIGNIFICANT CHANGE UP (ref 0–6)
HCT VFR BLD CALC: 41.2 % — SIGNIFICANT CHANGE UP (ref 34.5–45)
HGB BLD-MCNC: 12.8 G/DL — SIGNIFICANT CHANGE UP (ref 11.5–15.5)
IANC: 1.99 K/UL — SIGNIFICANT CHANGE UP (ref 1.8–7.4)
IMM GRANULOCYTES NFR BLD AUTO: 0.2 % — SIGNIFICANT CHANGE UP (ref 0–1.5)
LYMPHOCYTES # BLD AUTO: 1.79 K/UL — SIGNIFICANT CHANGE UP (ref 1–3.3)
LYMPHOCYTES # BLD AUTO: 40.7 % — SIGNIFICANT CHANGE UP (ref 13–44)
MCHC RBC-ENTMCNC: 28.4 PG — SIGNIFICANT CHANGE UP (ref 27–34)
MCHC RBC-ENTMCNC: 31.1 GM/DL — LOW (ref 32–36)
MCV RBC AUTO: 91.6 FL — SIGNIFICANT CHANGE UP (ref 80–100)
MONOCYTES # BLD AUTO: 0.5 K/UL — SIGNIFICANT CHANGE UP (ref 0–0.9)
MONOCYTES NFR BLD AUTO: 11.4 % — SIGNIFICANT CHANGE UP (ref 2–14)
NEUTROPHILS # BLD AUTO: 1.99 K/UL — SIGNIFICANT CHANGE UP (ref 1.8–7.4)
NEUTROPHILS NFR BLD AUTO: 45.2 % — SIGNIFICANT CHANGE UP (ref 43–77)
NRBC # BLD: 0 /100 WBCS — SIGNIFICANT CHANGE UP (ref 0–0)
NRBC # FLD: 0 K/UL — SIGNIFICANT CHANGE UP (ref 0–0)
PLATELET # BLD AUTO: 249 K/UL — SIGNIFICANT CHANGE UP (ref 150–400)
RBC # BLD: 4.5 M/UL — SIGNIFICANT CHANGE UP (ref 3.8–5.2)
RBC # FLD: 13.8 % — SIGNIFICANT CHANGE UP (ref 10.3–14.5)
WBC # BLD: 4.4 K/UL — SIGNIFICANT CHANGE UP (ref 3.8–10.5)
WBC # FLD AUTO: 4.4 K/UL — SIGNIFICANT CHANGE UP (ref 3.8–10.5)

## 2022-08-22 PROCEDURE — 99232 SBSQ HOSP IP/OBS MODERATE 35: CPT

## 2022-08-22 RX ORDER — HALOPERIDOL DECANOATE 100 MG/ML
50 INJECTION INTRAMUSCULAR ONCE
Refills: 0 | Status: DISCONTINUED | OUTPATIENT
Start: 2022-08-22 | End: 2022-08-22

## 2022-08-22 RX ORDER — BENZTROPINE MESYLATE 1 MG
1 TABLET ORAL
Refills: 0 | Status: DISCONTINUED | OUTPATIENT
Start: 2022-08-22 | End: 2022-09-19

## 2022-08-22 RX ADMIN — Medication 1 MILLIGRAM(S): at 20:38

## 2022-08-22 RX ADMIN — HALOPERIDOL DECANOATE 10 MILLIGRAM(S): 100 INJECTION INTRAMUSCULAR at 08:55

## 2022-08-22 RX ADMIN — CHLORHEXIDINE GLUCONATE 15 MILLILITER(S): 213 SOLUTION TOPICAL at 08:56

## 2022-08-22 RX ADMIN — CHLORHEXIDINE GLUCONATE 15 MILLILITER(S): 213 SOLUTION TOPICAL at 20:39

## 2022-08-22 RX ADMIN — HALOPERIDOL DECANOATE 10 MILLIGRAM(S): 100 INJECTION INTRAMUSCULAR at 20:39

## 2022-08-22 NOTE — BH INPATIENT PSYCHIATRY PROGRESS NOTE - NSBHCHARTREVIEWVS_PSY_A_CORE FT
Vital Signs Last 24 Hrs  T(C): 35.9 (08-22-22 @ 08:18), Max: 36.7 (08-21-22 @ 15:32)  T(F): 96.6 (08-22-22 @ 08:18), Max: 98.1 (08-21-22 @ 15:32)  HR: --  BP: --  BP(mean): --  RR: --  SpO2: --    Orthostatic VS  08-22-22 @ 08:18  Lying BP: 127/72 HR: 62  Sitting BP: 126/75 HR: 78  Standing BP: --/-- HR: --  Site: --  Mode: --  Orthostatic VS  08-21-22 @ 08:07  Lying BP: --/-- HR: --  Sitting BP: 108/75 HR: 65  Standing BP: 115/74 HR: 82  Site: upper right arm  Mode: electronic

## 2022-08-22 NOTE — BH INPATIENT PSYCHIATRY PROGRESS NOTE - NSBHFUPINTERVALHXFT_PSY_A_CORE
Chart reviewed and case discussed with treatment team. Staff report patient remains calm though isolative and withdrawn. Patient remains focused on discharge. She is somewhat illogical in her thought process - she is focused on discharge only at this time and does not seem to process other information discussed during assessment. Writer informed her about AOT, ACT team services as well as her ANC levels and what it means for her treatment, none of which she seems to pay attention to. She was also informed she would get her first haldol decanoate injection but still responded by asking when she would be discharged home. Of note, she had some unsteadiness today and when writer pointed it out, she simply denied it.

## 2022-08-22 NOTE — BH INPATIENT PSYCHIATRY PROGRESS NOTE - NSBHASSESSSUMMFT_PSY_ALL_CORE
72 year old  woman, domiciled with daughter, hx of chronic paranoid schizophrenia with multiple prior psychiatric hospitalizations and long standing hx of medication nonadherence, no hx of suicide attempts or suicidality, BIB EMS activated by patient’s daughter due to confrontational behavior towards landlord due to paranoia towards him. She has had incidents like this in the past, also including paranoia towards her neighbors.  Daughter reports patient's behavior has been very concerning due to inability to care for self (severe weight loss) and confrontational behavior that gets her into trouble with people around her. TOO obtained - has been med compliant. Patient has had partial response to medications - has chronic paranoia about landlord, is guarded and illogical at times but more cooperative with assessments and aftercare planning. Has had no agitation. AOT/ACT being pursued but recently medications cannot yet be finalized due to aranulocytosis with risperidone. She was switched to haldol and ANC is now normal.     Plan:    1) Disposition:   - continue inpatient care - needs enhanced aftercare planning in order for patient to be accepted back to live with family while having residual psychosis  - d/c to home once AOT/ACT established- will need to obtain court date for AOT  2) Legal status: 9.27   3) Psychotropic medications:  - continue haldol 10mg po bid   - can give haldol decanoate 50mg once today then another 100mg in another few days  - will consider acquiring ingrezza for patient's TD though it has been improving since admission  - on TOO - haldol IM if patient refuses PO  4) Non-pharmacologic interventions:  - individual, group, milieu therapy as appropriate  5) Medical comorbidities:  - no acute needs other than gait instability  6) Work up:  - repeat CBC to track ANC - next check on 8/24/22  7) Social issues: patient not allowing care coordination with family but now consented to obtain records from other providers  8) Psychoeducation: Risks and benefits discussed with patient, psychoeducation provided but patient still has poor insight and judgement

## 2022-08-22 NOTE — BH INPATIENT PSYCHIATRY PROGRESS NOTE - MSE UNSTRUCTURED FT
Appearance: slightly unsteady gait but able to catch herself.; limited oral hygiene; dressed in hospital gown; no tremors but there is odd posturing with her arms that is not new  Behavior: poorly related, guarded, limited eye contact  Speech: low volume, normal rate, fluent, poor articulation  Mood: "ok"  Affect: mildly dysphoric (appropriately so), constricted, stable  Thought process: illogical, inconsistent, concrete  Thought content: paranoid delusions remain but not leading to outbursts at this time, no reports of SI  Perceptual disturbances: no appearance of internal preoccupation  Orientation: knows month, year but not day of week or date; knows name of hospital  Abstraction: concrete  Fund of knowledge: limited  Insight: poor  Judgement: somewhat improving

## 2022-08-22 NOTE — BH INPATIENT PSYCHIATRY PROGRESS NOTE - CURRENT MEDICATION
MEDICATIONS  (STANDING):  chlorhexidine 0.12% Liquid 15 milliLiter(s) Swish and Spit two times a day  haloperidol    Concentrate 10 milliGRAM(s) Oral two times a day  haloperidol decanoate Injectable, Long Acting 50 milliGRAM(s) IntraMuscular once    MEDICATIONS  (PRN):  haloperidol    Injectable 5 milliGRAM(s) IntraMuscular two times a day PRN if haldol PO refused

## 2022-08-23 PROCEDURE — 99232 SBSQ HOSP IP/OBS MODERATE 35: CPT

## 2022-08-23 RX ORDER — HALOPERIDOL DECANOATE 100 MG/ML
50 INJECTION INTRAMUSCULAR ONCE
Refills: 0 | Status: COMPLETED | OUTPATIENT
Start: 2022-08-23 | End: 2022-08-23

## 2022-08-23 RX ADMIN — Medication 1 MILLIGRAM(S): at 21:19

## 2022-08-23 RX ADMIN — HALOPERIDOL DECANOATE 10 MILLIGRAM(S): 100 INJECTION INTRAMUSCULAR at 09:17

## 2022-08-23 RX ADMIN — Medication 1 MILLIGRAM(S): at 09:17

## 2022-08-23 RX ADMIN — CHLORHEXIDINE GLUCONATE 15 MILLILITER(S): 213 SOLUTION TOPICAL at 21:20

## 2022-08-23 RX ADMIN — CHLORHEXIDINE GLUCONATE 15 MILLILITER(S): 213 SOLUTION TOPICAL at 09:18

## 2022-08-23 RX ADMIN — HALOPERIDOL DECANOATE 10 MILLIGRAM(S): 100 INJECTION INTRAMUSCULAR at 21:19

## 2022-08-23 RX ADMIN — HALOPERIDOL DECANOATE 50 MILLIGRAM(S): 100 INJECTION INTRAMUSCULAR at 13:14

## 2022-08-23 NOTE — BH INPATIENT PSYCHIATRY PROGRESS NOTE - NSBHASSESSSUMMFT_PSY_ALL_CORE
72 year old  woman, domiciled with daughter, hx of chronic paranoid schizophrenia with multiple prior psychiatric hospitalizations and long standing hx of medication nonadherence, no hx of suicide attempts or suicidality, BIB EMS activated by patient’s daughter due to confrontational behavior towards landlord due to paranoia towards him. She has had incidents like this in the past, also including paranoia towards her neighbors.  Daughter reports patient's behavior has been very concerning due to inability to care for self (severe weight loss) and confrontational behavior that gets her into trouble with people around her. TOO obtained - has been med compliant. Patient has had partial response to medications - has chronic paranoia about landlord, is guarded and illogical at times but more cooperative with assessments and aftercare planning. Has had no agitation. AOT/ACT being pursued and waiting for records to complete AOT application. She was switched to haldol and ANC is now normal.     Plan:    1) Disposition:   - continue inpatient care - needs enhanced aftercare planning in order for patient to be accepted back to live with family while having residual psychosis  - d/c to home once AOT/ACT established  2) Legal status: 9.27   3) Psychotropic medications:  - continue haldol 10mg po bid   - got haldol decanoate 50mg once today - will add another 100mg in a week  - will consider acquiring ingrezza for patient's TD though it has been improving since admission  - on TOO - haldol IM if patient refuses PO  4) Non-pharmacologic interventions:  - individual, group, milieu therapy as appropriate  5) Medical comorbidities:  - no acute needs other than gait instability  6) Work up:  - repeat CBC to track ANC - next check on 8/24/22  7) Social issues: patient not allowing care coordination with family but now consented to obtain records from other providers  8) Psychoeducation: Risks and benefits discussed with patient, psychoeducation provided but patient still has poor insight and judgement

## 2022-08-23 NOTE — BH INPATIENT PSYCHIATRY PROGRESS NOTE - NSBHFUPINTERVALHXFT_PSY_A_CORE
Chart reviewed and case discussed with treatment team. Staff report patient has been calm and cooperative. She remains med compliant. She is still illogical but there is increased willingness to discuss care. Patient maintains that she will not allow treatment team to discuss care with her daughter.

## 2022-08-23 NOTE — BH INPATIENT PSYCHIATRY PROGRESS NOTE - MSE UNSTRUCTURED FT
Appearance: poor oral hygiene; dressed in hospital gown; no tremors but there is odd posturing with her arms that is not new; there is no cogwheeling however; gait is somewhat slow but stable  Behavior: remains guarded, fair eye contact; mildly subdued  Speech: low volume, normal rate, fluent, poor articulation  Mood: "good"  Affect: neutral, less constricted, stable  Thought process: illogical, concrete  Thought content: paranoid delusions remain but not leading to outbursts at this time, no reports of SI  Perceptual disturbances: no appearance of internal preoccupation  Orientation: knows month, year but not day of week or date; knows name of hospital  Abstraction: concrete  Fund of knowledge: limited  Insight: poor  Judgement: somewhat improving

## 2022-08-23 NOTE — BH INPATIENT PSYCHIATRY PROGRESS NOTE - CURRENT MEDICATION
MEDICATIONS  (STANDING):  benztropine 1 milliGRAM(s) Oral two times a day  chlorhexidine 0.12% Liquid 15 milliLiter(s) Swish and Spit two times a day  haloperidol    Concentrate 10 milliGRAM(s) Oral two times a day    MEDICATIONS  (PRN):  haloperidol    Injectable 5 milliGRAM(s) IntraMuscular two times a day PRN if haldol PO refused

## 2022-08-23 NOTE — BH INPATIENT PSYCHIATRY PROGRESS NOTE - NSBHCHARTREVIEWVS_PSY_A_CORE FT
Vital Signs Last 24 Hrs  T(C): 36.7 (08-23-22 @ 15:43), Max: 36.7 (08-23-22 @ 15:43)  T(F): 98.1 (08-23-22 @ 15:43), Max: 98.1 (08-23-22 @ 15:43)  HR: --  BP: --  BP(mean): --  RR: --  SpO2: 99% (08-23-22 @ 15:43) (97% - 99%)    Orthostatic VS  08-23-22 @ 08:35  Lying BP: --/-- HR: --  Sitting BP: 115/60 HR: 60  Standing BP: 98/64 HR: 74  Site: --  Mode: --  Orthostatic VS  08-22-22 @ 08:18  Lying BP: 127/72 HR: 62  Sitting BP: 126/75 HR: 78  Standing BP: --/-- HR: --  Site: --  Mode: --

## 2022-08-24 LAB
BASOPHILS # BLD AUTO: 0.05 K/UL — SIGNIFICANT CHANGE UP (ref 0–0.2)
BASOPHILS NFR BLD AUTO: 1.2 % — SIGNIFICANT CHANGE UP (ref 0–2)
EOSINOPHIL # BLD AUTO: 0.04 K/UL — SIGNIFICANT CHANGE UP (ref 0–0.5)
EOSINOPHIL NFR BLD AUTO: 1 % — SIGNIFICANT CHANGE UP (ref 0–6)
HCT VFR BLD CALC: 40.7 % — SIGNIFICANT CHANGE UP (ref 34.5–45)
HGB BLD-MCNC: 12.3 G/DL — SIGNIFICANT CHANGE UP (ref 11.5–15.5)
IANC: 2.38 K/UL — SIGNIFICANT CHANGE UP (ref 1.8–7.4)
IMM GRANULOCYTES NFR BLD AUTO: 0.2 % — SIGNIFICANT CHANGE UP (ref 0–1.5)
LYMPHOCYTES # BLD AUTO: 1.16 K/UL — SIGNIFICANT CHANGE UP (ref 1–3.3)
LYMPHOCYTES # BLD AUTO: 28.4 % — SIGNIFICANT CHANGE UP (ref 13–44)
MCHC RBC-ENTMCNC: 28.1 PG — SIGNIFICANT CHANGE UP (ref 27–34)
MCHC RBC-ENTMCNC: 30.2 GM/DL — LOW (ref 32–36)
MCV RBC AUTO: 93.1 FL — SIGNIFICANT CHANGE UP (ref 80–100)
MONOCYTES # BLD AUTO: 0.44 K/UL — SIGNIFICANT CHANGE UP (ref 0–0.9)
MONOCYTES NFR BLD AUTO: 10.8 % — SIGNIFICANT CHANGE UP (ref 2–14)
NEUTROPHILS # BLD AUTO: 2.38 K/UL — SIGNIFICANT CHANGE UP (ref 1.8–7.4)
NEUTROPHILS NFR BLD AUTO: 58.4 % — SIGNIFICANT CHANGE UP (ref 43–77)
NRBC # BLD: 0 /100 WBCS — SIGNIFICANT CHANGE UP (ref 0–0)
NRBC # FLD: 0 K/UL — SIGNIFICANT CHANGE UP (ref 0–0)
PLATELET # BLD AUTO: 217 K/UL — SIGNIFICANT CHANGE UP (ref 150–400)
RBC # BLD: 4.37 M/UL — SIGNIFICANT CHANGE UP (ref 3.8–5.2)
RBC # FLD: 13.8 % — SIGNIFICANT CHANGE UP (ref 10.3–14.5)
WBC # BLD: 4.08 K/UL — SIGNIFICANT CHANGE UP (ref 3.8–10.5)
WBC # FLD AUTO: 4.08 K/UL — SIGNIFICANT CHANGE UP (ref 3.8–10.5)

## 2022-08-24 PROCEDURE — 99232 SBSQ HOSP IP/OBS MODERATE 35: CPT

## 2022-08-24 RX ADMIN — CHLORHEXIDINE GLUCONATE 15 MILLILITER(S): 213 SOLUTION TOPICAL at 20:07

## 2022-08-24 RX ADMIN — Medication 1 MILLIGRAM(S): at 20:06

## 2022-08-24 RX ADMIN — HALOPERIDOL DECANOATE 10 MILLIGRAM(S): 100 INJECTION INTRAMUSCULAR at 20:07

## 2022-08-24 RX ADMIN — CHLORHEXIDINE GLUCONATE 15 MILLILITER(S): 213 SOLUTION TOPICAL at 09:12

## 2022-08-24 RX ADMIN — HALOPERIDOL DECANOATE 10 MILLIGRAM(S): 100 INJECTION INTRAMUSCULAR at 09:09

## 2022-08-24 RX ADMIN — Medication 1 MILLIGRAM(S): at 09:09

## 2022-08-24 NOTE — BH INPATIENT PSYCHIATRY PROGRESS NOTE - NSBHMETABOLIC_PSY_ALL_CORE_FT
BMI: BMI (kg/m2): 20.2 (08-13-22 @ 15:51)  HbA1c: A1C with Estimated Average Glucose Result: 5.9 % (06-19-22 @ 08:45)    Glucose:   BP: 119/74 (08-24-22 @ 08:22) (119/74 - 119/74)  Lipid Panel: Date/Time: 06-19-22 @ 08:45  Cholesterol, Serum: 165  Direct LDL: --  HDL Cholesterol, Serum: 64  Total Cholesterol/HDL Ration Measurement: --  Triglycerides, Serum: 58

## 2022-08-24 NOTE — BH INPATIENT PSYCHIATRY PROGRESS NOTE - NSBHFUPINTERVALHXFT_PSY_A_CORE
Chart reviewed and case discussed with treatment team. Staff report patient has been calm, cooperative. She is med compliant. She seems to be taking more of an interest in her care in that she inquired about the results of the blood test from this morning. Patient remains paranoid and illogical. She denies having had psychiatric treatment before.

## 2022-08-24 NOTE — BH INPATIENT PSYCHIATRY PROGRESS NOTE - NSBHCHARTREVIEWVS_PSY_A_CORE FT
Vital Signs Last 24 Hrs  T(C): 36.9 (08-24-22 @ 16:11), Max: 36.9 (08-24-22 @ 16:11)  T(F): 98.4 (08-24-22 @ 16:11), Max: 98.4 (08-24-22 @ 16:11)  HR: 67 (08-24-22 @ 08:22) (67 - 67)  BP: 119/74 (08-24-22 @ 08:22) (119/74 - 119/74)  BP(mean): --  RR: --  SpO2: 99% (08-24-22 @ 16:11) (99% - 99%)    Orthostatic VS  08-23-22 @ 08:35  Lying BP: --/-- HR: --  Sitting BP: 115/60 HR: 60  Standing BP: 98/64 HR: 74  Site: --  Mode: --

## 2022-08-25 PROCEDURE — 99232 SBSQ HOSP IP/OBS MODERATE 35: CPT

## 2022-08-25 RX ADMIN — Medication 1 MILLIGRAM(S): at 09:31

## 2022-08-25 RX ADMIN — CHLORHEXIDINE GLUCONATE 15 MILLILITER(S): 213 SOLUTION TOPICAL at 09:31

## 2022-08-25 RX ADMIN — HALOPERIDOL DECANOATE 10 MILLIGRAM(S): 100 INJECTION INTRAMUSCULAR at 09:31

## 2022-08-25 RX ADMIN — Medication 1 MILLIGRAM(S): at 20:10

## 2022-08-25 RX ADMIN — HALOPERIDOL DECANOATE 10 MILLIGRAM(S): 100 INJECTION INTRAMUSCULAR at 20:08

## 2022-08-25 RX ADMIN — CHLORHEXIDINE GLUCONATE 15 MILLILITER(S): 213 SOLUTION TOPICAL at 20:08

## 2022-08-25 NOTE — BH INPATIENT PSYCHIATRY PROGRESS NOTE - NSBHFUPINTERVALHXFT_PSY_A_CORE
Chart reviewed and case discussed with treatment team. Staff report patient has been calm and med compliant. Patient remains illogical. She was found sleeping while seated in the day room. She was woken for assessment. Writer again inquired if medications are causing sedation to which she stated she was not sleeping. She remains focused on discharge. Patient continues to claim she did everything she was asked and asks "what did I do to deserve this?" Patient continues to have poor insight and unable to account for her behavior.

## 2022-08-25 NOTE — BH INPATIENT PSYCHIATRY PROGRESS NOTE - NSBHASSESSSUMMFT_PSY_ALL_CORE
72 year old  woman, domiciled with daughter, hx of chronic paranoid schizophrenia with multiple prior psychiatric hospitalizations and long standing hx of medication nonadherence, no hx of suicide attempts or suicidality, BIB EMS activated by patient’s daughter due to confrontational behavior towards landlord due to paranoia towards him. She has had incidents like this in the past, also including paranoia towards her neighbors.  Daughter reports patient's behavior has been very concerning due to inability to care for self (severe weight loss) and confrontational behavior that gets her into trouble with people around her. TOO obtained - has been med compliant. Patient has had partial response to medications - has chronic paranoia about landlord, is guarded and illogical at times but more cooperative with assessments and aftercare planning. Has had no agitation. AOT/ACT being pursued and waiting for records to complete AOT application. She was switched to haldol and ANC is now normal.     Plan:    1) Disposition:   - continue inpatient care - needs enhanced aftercare planning in order for patient to be accepted back to live with family while having residual psychosis  - d/c to home once AOT/ACT established  2) Legal status: 9.27   3) Psychotropic medications:  - continue haldol 10mg po bid   - got haldol decanoate 50mg once yesterday 8/24/22 - will add another 100mg in a week  - will consider acquiring ingrezza for patient's TD though it has been improving since admission  - on TOO - haldol IM if patient refuses PO  4) Non-pharmacologic interventions:  - individual, group, milieu therapy as appropriate  5) Medical comorbidities:  - no acute needs other than gait instability  6) Work up:  - none  7) Social issues: patient not allowing care coordination with family but now consented to obtain records from other providers  8) Psychoeducation: Risks and benefits discussed with patient, psychoeducation provided but patient still has poor insight and judgement

## 2022-08-25 NOTE — BH INPATIENT PSYCHIATRY PROGRESS NOTE - NSBHCHARTREVIEWVS_PSY_A_CORE FT
Vital Signs Last 24 Hrs  T(C): 36.8 (08-25-22 @ 15:41), Max: 36.8 (08-25-22 @ 15:41)  T(F): 98.3 (08-25-22 @ 15:41), Max: 98.3 (08-25-22 @ 15:41)  HR: --  BP: --  BP(mean): --  RR: 16 (08-25-22 @ 15:41) (16 - 17)  SpO2: 98% (08-25-22 @ 15:41) (98% - 100%)    Orthostatic VS  08-25-22 @ 07:29  Lying BP: --/-- HR: --  Sitting BP: 104/64 HR: 64  Standing BP: 105/58 HR: 76  Site: upper left arm  Mode: electronic

## 2022-08-25 NOTE — BH INPATIENT PSYCHIATRY PROGRESS NOTE - MSE UNSTRUCTURED FT
Appearance: poor oral hygiene; dressed in hospital gown; no tremors but there is odd posturing with her arms that is not new; there is no cogwheeling however; gait is stable today  Behavior: adequately engaged, calm, mild psychomotor retardation  Speech: low volume, normal rate, fluent, poor articulation  Mood: "ok"  Affect: neutral, mildly constricted, stable  Thought process: illogical, concrete  Thought content: paranoid delusions remain but not leading to outbursts at this time, no reports of SI  Perceptual disturbances: no appearance of internal preoccupation  Orientation: knows month, year but not day of week or date; knows name of hospital  Abstraction: concrete  Fund of knowledge: limited  Insight: poor  Judgement: somewhat improving

## 2022-08-26 PROCEDURE — 99232 SBSQ HOSP IP/OBS MODERATE 35: CPT

## 2022-08-26 PROCEDURE — 90832 PSYTX W PT 30 MINUTES: CPT

## 2022-08-26 RX ADMIN — CHLORHEXIDINE GLUCONATE 15 MILLILITER(S): 213 SOLUTION TOPICAL at 08:56

## 2022-08-26 RX ADMIN — HALOPERIDOL DECANOATE 10 MILLIGRAM(S): 100 INJECTION INTRAMUSCULAR at 20:19

## 2022-08-26 RX ADMIN — HALOPERIDOL DECANOATE 10 MILLIGRAM(S): 100 INJECTION INTRAMUSCULAR at 08:56

## 2022-08-26 RX ADMIN — Medication 1 MILLIGRAM(S): at 08:55

## 2022-08-26 RX ADMIN — Medication 1 MILLIGRAM(S): at 20:19

## 2022-08-26 RX ADMIN — CHLORHEXIDINE GLUCONATE 15 MILLILITER(S): 213 SOLUTION TOPICAL at 20:19

## 2022-08-26 NOTE — BH PSYCHOLOGY - CLINICIAN PSYCHOTHERAPY NOTE - NSTXDISORGGOALOTHER_PSY_ALL_CORE
Over the next week, pt will work on completing a safety plan including at least 2 warning signs for improved symptom management and sustained recovery.

## 2022-08-26 NOTE — BH INPATIENT PSYCHIATRY PROGRESS NOTE - MSE UNSTRUCTURED FT
Appearance: poor oral hygiene; dressed in hospital gown; no tremors but there is odd posturing with her arms that is not new; there is no cogwheeling however; gait is stable today  Behavior: adequately engaged, calm, mild psychomotor retardation  Speech: low volume, normal rate, fluent, poor articulation  Mood: "ok"  Affect: neutral, mildly constricted, stable  Thought process: illogical, concrete  Thought content: paranoid delusions remain but not leading to outbursts at this time, no reports of SI  Perceptual disturbances: no appearance of internal preoccupation  Orientation: knows month, year but not day of week or date; knows name of hospital  Abstraction: concrete  Fund of knowledge: limited  Insight: poor  Judgement: somewhat improving     Appearance: poor oral hygiene; dressed in hospital gown; no tremors but there is odd posturing with her arms that is not new; there is no cogwheeling; gait is stable   Behavior: superficially engaged, oddly related, mild psychomotor retardation  Speech: low volume, normal rate, fluent, poor articulation  Mood: "good"  Affect: neutral, mildly constricted, stable  Thought process: illogical, concrete  Thought content: paranoid delusions remain but not leading to outbursts at this time, no reports of SI  Perceptual disturbances: no appearance of internal preoccupation  Orientation: knows month, year but not day of week or date; knows name of hospital  Fund of knowledge: limited  Insight: poor  Judgement: somewhat improving

## 2022-08-26 NOTE — BH PSYCHOLOGY - CLINICIAN PSYCHOTHERAPY NOTE - NSBHPSYCHOLINT_PSY_A_CORE
Cognitive/behavioral therapy/Stress management/Supported coping skills/Supportive therapy/Treatment compliance encouraged

## 2022-08-26 NOTE — BH INPATIENT PSYCHIATRY PROGRESS NOTE - NSBHFUPINTERVALHXFT_PSY_A_CORE
Chart reviewed and case discussed with treatment team. Staff report    Chart reviewed and case discussed with treatment team. Staff report patient remains quiet, med compliant but guarded and paranoid. She does not attend groups unless she happens to be sitting in the day room when groups happen around her. She is illogical and obsesses about certain aspects of our conversation in a concrete way. She is focused on discharge and repeatedly requests for a discharge date despite writer telling her multiple times this is not possible. Writer again reminded her what AOT and ACT team is.

## 2022-08-26 NOTE — BH PSYCHOLOGY - CLINICIAN PSYCHOTHERAPY NOTE - TOKEN PULL-DIAGNOSIS
Primary Diagnosis:  Schizophrenia [F20.9]      Paranoid schizophrenia [F20.0]        Problem Dx:   Poor dental hygiene [Z91.89]      Medical non-compliance [Z91.19]      Tardive dyskinesia [G24.01]

## 2022-08-26 NOTE — BH PSYCHOLOGY - CLINICIAN PSYCHOTHERAPY NOTE - NSBHPSYCHOLNARRATIVE_PSY_A_CORE FT
Psychologist met with the patient for an individual therapy session at the request of the treatment team and consent of the patient. Patient saw psychologist in dayroom and requested to speak. Patient processed emotions associated with her hospitalization. Themes included sadness and disappointment in her length of stay and hopes for the future. While she declined to engage in coping skills exercises, she identified a coping skill that she found enjoyable (i.e. social support/speaking with her daughter). She responded well to empathy and validation of her emotional experience, which she reported being helpful. Psychologist encouraged patient to continue communicating her needs with the treatment team and reaching out to her daughter as she continues to find it helpful. Psychologist informed the patient that psychology can be available to her in the future if needed. Patient expressed gratitude to the psychologist and wished him well.      Appearance: Patient appeared their stated age, appropriately attired in hospital gown.   Cognition and sensorium: Patient initially appeared somnolent but when seeing the psychologist, appeared to perk up and was awake and alert during the course of the session.  Memory appeared sufficient for purposes of this session.   Behavior: Patient was generally cooperative. Eye contact was fair and appropriate.   Motor: Mild psychomotor retardation    Gait: N/A patient was seen in chair for entire visit  Speech:  Rate was normal. Volume, low. Fluent production, poor articulation. Tone was anxious. Quantity was only responsive to questions, though limited in her elaboration.  Mood: Reported as "bad,” she did not rate her   Affect: Appeared slightly dysthymic, somewhat narrow in range. Congruent with reported thought/mood.   Thought process (observed):  slightly illogical, somewhat concrete  Thought Content: Patient did not endorse Auditory Hallucinations or Visual Hallucinations.  Pt did not endorse suicidal/homicidal ideation, intent, plan, or urge to self-harm.  Attention/Concentration: good   Impulse control: fair.  Insight:  limited insight, patient could not explain why she was on unit or elaborate on any symptoms/challenges she is experiencing beyond feeling sad about her current stay   Judgment: fair based on her desire to seek out assistance

## 2022-08-26 NOTE — BH INPATIENT PSYCHIATRY PROGRESS NOTE - NSBHASSESSSUMMFT_PSY_ALL_CORE
72 year old  woman, domiciled with daughter, hx of chronic paranoid schizophrenia with multiple prior psychiatric hospitalizations and long standing hx of medication nonadherence, no hx of suicide attempts or suicidality, BIB EMS activated by patient’s daughter due to confrontational behavior towards landlord due to paranoia towards him. She has had incidents like this in the past, also including paranoia towards her neighbors.  Daughter reports patient's behavior has been very concerning due to inability to care for self (severe weight loss) and confrontational behavior that gets her into trouble with people around her. TOO obtained - has been med compliant. Patient has had partial response to medications - has chronic paranoia about landlord, is guarded and illogical at times but more cooperative with assessments and aftercare planning. Has had no agitation. AOT/ACT being pursued and waiting for records to complete AOT application. She was switched to haldol and ANC is now normal.     Plan:    1) Disposition:   - continue inpatient care - needs enhanced aftercare planning in order for patient to be accepted back to live with family while having residual psychosis  - d/c to home once AOT/ACT established  2) Legal status: 9.27   3) Psychotropic medications:  - continue haldol 10mg po bid   - got haldol decanoate 50mg once yesterday 8/24/22 - will add another 100mg in a week  - will consider acquiring ingrezza for patient's TD though it has been improving since admission  - on TOO - haldol IM if patient refuses PO  4) Non-pharmacologic interventions:  - individual, group, milieu therapy as appropriate  5) Medical comorbidities:  - no acute needs other than gait instability  6) Work up:  - none  7) Social issues: patient not allowing care coordination with family but now consented to obtain records from other providers  8) Psychoeducation: Risks and benefits discussed with patient, psychoeducation provided but patient still has poor insight and judgement  72 year old  woman, domiciled with daughter, hx of chronic paranoid schizophrenia with multiple prior psychiatric hospitalizations and long standing hx of medication nonadherence, no hx of suicide attempts or suicidality, BIB EMS activated by patient’s daughter due to confrontational behavior towards landlord due to paranoia towards him. She has had incidents like this in the past, also including paranoia towards her neighbors.  Daughter reports patient's behavior has been very concerning due to inability to care for self (severe weight loss) and confrontational behavior that gets her into trouble with people around her. TOO obtained - has been med compliant. Patient has had partial response to medications - has chronic paranoia about landlord, is guarded and illogical at times but more cooperative with assessments and aftercare planning. Has had no agitation. AOT/ACT being pursued and waiting for records to complete AOT application. She was switched to haldol and ANC is now normal.     Plan:    1) Disposition:   - continue inpatient care - needs enhanced aftercare planning in order for patient to be accepted back to live with family while having residual psychosis  - d/c to home once AOT/ACT established  2) Legal status: 9.27   3) Psychotropic medications:  - continue haldol 10mg po bid   - got haldol decanoate 50mg once 8/24/22 - will add another 100mg in a week  - will consider acquiring ingrezza for patient's TD though it has been improving since admission  - on TOO - haldol IM if patient refuses PO  4) Non-pharmacologic interventions:  - individual, group, milieu therapy as appropriate  5) Medical comorbidities:  - no acute needs other than gait instability  6) Work up:  - none  7) Social issues: patient not allowing care coordination with family but now consented to obtain records from other providers; AOT application in progress  8) Psychoeducation: Risks and benefits discussed with patient, psychoeducation provided but patient still has poor insight and judgement

## 2022-08-26 NOTE — BH INPATIENT PSYCHIATRY PROGRESS NOTE - NSBHCHARTREVIEWVS_PSY_A_CORE FT
Vital Signs Last 24 Hrs  T(C): 37.7 (08-26-22 @ 16:01), Max: 37.7 (08-26-22 @ 16:01)  T(F): 99.9 (08-26-22 @ 16:01), Max: 99.9 (08-26-22 @ 16:01)  HR: --  BP: --  BP(mean): --  RR: --  SpO2: 99% (08-26-22 @ 16:01) (99% - 99%)    Orthostatic VS  08-26-22 @ 08:49  Lying BP: --/-- HR: --  Sitting BP: 101/69 HR: 64  Standing BP: 112/68 HR: 77  Site: --  Mode: --  Orthostatic VS  08-25-22 @ 07:29  Lying BP: --/-- HR: --  Sitting BP: 104/64 HR: 64  Standing BP: 105/58 HR: 76  Site: upper left arm  Mode: electronic

## 2022-08-27 RX ADMIN — Medication 1 MILLIGRAM(S): at 09:32

## 2022-08-27 RX ADMIN — HALOPERIDOL DECANOATE 10 MILLIGRAM(S): 100 INJECTION INTRAMUSCULAR at 20:27

## 2022-08-27 RX ADMIN — CHLORHEXIDINE GLUCONATE 15 MILLILITER(S): 213 SOLUTION TOPICAL at 20:29

## 2022-08-27 RX ADMIN — CHLORHEXIDINE GLUCONATE 15 MILLILITER(S): 213 SOLUTION TOPICAL at 09:32

## 2022-08-27 RX ADMIN — HALOPERIDOL DECANOATE 10 MILLIGRAM(S): 100 INJECTION INTRAMUSCULAR at 09:32

## 2022-08-27 RX ADMIN — Medication 1 MILLIGRAM(S): at 20:28

## 2022-08-28 RX ADMIN — CHLORHEXIDINE GLUCONATE 15 MILLILITER(S): 213 SOLUTION TOPICAL at 09:04

## 2022-08-28 RX ADMIN — CHLORHEXIDINE GLUCONATE 15 MILLILITER(S): 213 SOLUTION TOPICAL at 20:34

## 2022-08-28 RX ADMIN — Medication 1 MILLIGRAM(S): at 20:34

## 2022-08-28 RX ADMIN — Medication 1 MILLIGRAM(S): at 09:03

## 2022-08-28 RX ADMIN — HALOPERIDOL DECANOATE 10 MILLIGRAM(S): 100 INJECTION INTRAMUSCULAR at 09:03

## 2022-08-28 RX ADMIN — HALOPERIDOL DECANOATE 10 MILLIGRAM(S): 100 INJECTION INTRAMUSCULAR at 20:34

## 2022-08-29 PROCEDURE — 99232 SBSQ HOSP IP/OBS MODERATE 35: CPT

## 2022-08-29 RX ADMIN — HALOPERIDOL DECANOATE 10 MILLIGRAM(S): 100 INJECTION INTRAMUSCULAR at 09:10

## 2022-08-29 RX ADMIN — Medication 1 MILLIGRAM(S): at 20:45

## 2022-08-29 RX ADMIN — HALOPERIDOL DECANOATE 10 MILLIGRAM(S): 100 INJECTION INTRAMUSCULAR at 20:45

## 2022-08-29 RX ADMIN — Medication 1 MILLIGRAM(S): at 09:09

## 2022-08-29 RX ADMIN — CHLORHEXIDINE GLUCONATE 15 MILLILITER(S): 213 SOLUTION TOPICAL at 21:50

## 2022-08-29 NOTE — BH INPATIENT PSYCHIATRY PROGRESS NOTE - NSBHCHARTREVIEWVS_PSY_A_CORE FT
Vital Signs Last 24 Hrs  T(C): 36.8 (08-29-22 @ 15:53), Max: 36.8 (08-29-22 @ 15:53)  T(F): 98.3 (08-29-22 @ 15:53), Max: 98.3 (08-29-22 @ 15:53)  HR: --  BP: --  BP(mean): --  RR: 20 (08-29-22 @ 15:53) (20 - 20)  SpO2: 99% (08-29-22 @ 15:53) (99% - 99%)    Orthostatic VS  08-29-22 @ 08:05  Lying BP: --/-- HR: --  Sitting BP: 119/77 HR: 61  Standing BP: 109/63 HR: 69  Site: --  Mode: --  Orthostatic VS  08-28-22 @ 08:33  Lying BP: --/-- HR: --  Sitting BP: 123/71 HR: 82  Standing BP: 123/60 HR: 75  Site: --  Mode: --

## 2022-08-29 NOTE — BH INPATIENT PSYCHIATRY PROGRESS NOTE - NSBHASSESSSUMMFT_PSY_ALL_CORE
72 year old  woman, domiciled with daughter, hx of chronic paranoid schizophrenia with multiple prior psychiatric hospitalizations and long standing hx of medication nonadherence, no hx of suicide attempts or suicidality, BIB EMS activated by patient’s daughter due to confrontational behavior towards landlord due to paranoia towards him. She has had incidents like this in the past, also including paranoia towards her neighbors.  Daughter reports patient's behavior has been very concerning due to inability to care for self (severe weight loss) and confrontational behavior that gets her into trouble with people around her. TOO obtained - has been med compliant. Patient has had partial response to medications - has chronic paranoia about landlord, is guarded and illogical at times but more cooperative with assessments and aftercare planning. Has had no agitation. AOT/ACT being pursued and waiting for records to complete AOT application. She was switched to haldol and ANC is now normal.     Plan:    1) Disposition:   - continue inpatient care - needs enhanced aftercare planning in order for patient to be accepted back to live with family while having residual psychosis  - d/c to home once AOT/ACT established  2) Legal status: 9.27   3) Psychotropic medications:  - continue haldol 10mg po bid   - got haldol decanoate 50mg once 8/24/22 - will add another 100mg on 8/31/22  - will consider acquiring ingrezza for patient's TD though it has been improving since admission  - on TOO - haldol IM if patient refuses PO  4) Non-pharmacologic interventions:  - individual, group, milieu therapy as appropriate  5) Medical comorbidities:  - no acute needs other than gait instability  6) Work up:  - none  7) Social issues: patient now allowing care coordination with family and to obtain records from other providers; AOT application in progress  8) Psychoeducation: Risks and benefits discussed with patient, psychoeducation provided but patient not amenable

## 2022-08-29 NOTE — BH INPATIENT PSYCHIATRY PROGRESS NOTE - NSBHFUPINTERVALHXFT_PSY_A_CORE
Chart reviewed and case discussed with treatment team. Staff report patient remains guarded, isolative. Patient continues to minimize need for treatment. Paranoid beliefs about landlord remains but she did finally consent to treatment team discussing care with her daughter.

## 2022-08-29 NOTE — BH INPATIENT PSYCHIATRY PROGRESS NOTE - NSBHMETABOLIC_PSY_ALL_CORE_FT
BMI: BMI (kg/m2): 19.7 (08-27-22 @ 16:05)  HbA1c: A1C with Estimated Average Glucose Result: 5.9 % (06-19-22 @ 08:45)    Glucose:   BP: 107/65 (08-26-22 @ 20:23) (107/65 - 107/65)  Lipid Panel: Date/Time: 06-19-22 @ 08:45  Cholesterol, Serum: 165  Direct LDL: --  HDL Cholesterol, Serum: 64  Total Cholesterol/HDL Ration Measurement: --  Triglycerides, Serum: 58

## 2022-08-29 NOTE — BH INPATIENT PSYCHIATRY PROGRESS NOTE - MSE UNSTRUCTURED FT
Appearance: poor oral hygiene; no tremors or TD but there is odd posturing with her arms that is not new; there is no cogwheeling; gait is stable   Behavior: remains oddly related, no psychomotor retardation  Speech: low volume, normal rate, fluent, poor articulation  Mood: denies complaints  Affect: somber, mildly constricted, stable  Thought process: illogical, concrete  Thought content: paranoid delusions remain but not leading to outbursts at this time, no reports of SI  Perceptual disturbances: no appearance of internal preoccupation  Orientation: knows month, year but not day of week or date; knows name of hospital  Fund of knowledge: limited  Insight: poor  Judgement: somewhat improving

## 2022-08-30 PROCEDURE — 99232 SBSQ HOSP IP/OBS MODERATE 35: CPT

## 2022-08-30 RX ADMIN — HALOPERIDOL DECANOATE 10 MILLIGRAM(S): 100 INJECTION INTRAMUSCULAR at 20:19

## 2022-08-30 RX ADMIN — HALOPERIDOL DECANOATE 10 MILLIGRAM(S): 100 INJECTION INTRAMUSCULAR at 08:17

## 2022-08-30 RX ADMIN — CHLORHEXIDINE GLUCONATE 15 MILLILITER(S): 213 SOLUTION TOPICAL at 10:28

## 2022-08-30 RX ADMIN — Medication 1 MILLIGRAM(S): at 08:18

## 2022-08-30 RX ADMIN — CHLORHEXIDINE GLUCONATE 15 MILLILITER(S): 213 SOLUTION TOPICAL at 20:19

## 2022-08-30 RX ADMIN — Medication 1 MILLIGRAM(S): at 20:19

## 2022-08-30 NOTE — BH INPATIENT PSYCHIATRY PROGRESS NOTE - NSBHASSESSSUMMFT_PSY_ALL_CORE
72 year old  woman, domiciled with daughter, hx of chronic paranoid schizophrenia with multiple prior psychiatric hospitalizations and long standing hx of medication nonadherence, no hx of suicide attempts or suicidality, BIB EMS activated by patient’s daughter due to confrontational behavior towards landlord due to paranoia towards him. She has had incidents like this in the past, also including paranoia towards her neighbors.  Daughter reports patient's behavior has been very concerning due to inability to care for self (severe weight loss) and confrontational behavior that gets her into trouble with people around her. TOO obtained - has been med compliant. Patient has had partial response to medications - has chronic paranoia about landlord, is guarded and illogical at times but more cooperative with assessments and aftercare planning. Has had no agitation. AOT/ACT being pursued and waiting for records to complete AOT application. She was switched to haldol and ANC is now normal.     Plan:    1) Disposition:   - continue inpatient care - needs enhanced aftercare planning in order for patient to be accepted back to live with family while having residual psychosis  - d/c to home once AOT/ACT established  2) Legal status: 9.27   3) Psychotropic medications:  - continue haldol 10mg po bid   - got haldol decanoate 50mg once 8/24/22 - will add another 100mg on 8/31/22  - will consider acquiring ingrezza for patient's TD though it has been improving since admission  - on TOO - haldol IM if patient refuses PO  4) Non-pharmacologic interventions:  - individual, group, milieu therapy as appropriate  5) Medical comorbidities:  - no acute needs other than gait instability  6) Work up:  - none  7) Social issues: patient now allowing care coordination with family and to obtain records from other providers; AOT application in progress  8) Psychoeducation: Risks and benefits discussed with patient, psychoeducation provided but patient not amenable 72 year old  woman, domiciled with daughter, hx of chronic paranoid schizophrenia with multiple prior psychiatric hospitalizations and long standing hx of medication nonadherence, no hx of suicide attempts or suicidality, BIB EMS activated by patient’s daughter due to confrontational behavior towards landlord due to paranoia towards him. She has had incidents like this in the past, also including paranoia towards her neighbors.  Daughter reports patient's behavior has been very concerning due to inability to care for self (severe weight loss) and confrontational behavior that gets her into trouble with people around her. TOO obtained - has been med compliant. Patient has had partial response to medications - has chronic paranoia about landlord, is guarded and illogical at times but more cooperative with assessments and aftercare planning. Has had no agitation. AOT/ACT being pursued and waiting for records to complete AOT application. She was switched to haldol and ANC is now normal.     Plan:    1) Disposition:   - continue inpatient care - needs enhanced aftercare planning in order for patient to be accepted back to live with family while having residual psychosis  - d/c to home once AOT/ACT established  2) Legal status: 9.27   3) Psychotropic medications:  - continue haldol 10mg po bid   - got haldol decanoate 50mg once 8/24/22 - will add another 100mg on 8/31/22  - will consider acquiring ingrezza for patient's TD though it has been improving since admission  - on TOO - haldol IM if patient refuses PO  4) Non-pharmacologic interventions:  - individual, group, milieu therapy as appropriate  5) Medical comorbidities:  - no acute needs other than gait instability  6) Work up:  - check CMP tomorrow morning prior to next decanoate dose  7) Social issues: patient now allowing care coordination with family and to obtain records from other providers; AOT application in progress  8) Psychoeducation: Risks and benefits discussed with patient, psychoeducation provided but patient not amenable

## 2022-08-30 NOTE — BH INPATIENT PSYCHIATRY PROGRESS NOTE - NSBHFUPINTERVALHXFT_PSY_A_CORE
Chart reviewed and case discussed with treatment team. Staff report    Chart reviewed and case discussed with treatment team. Staff report patient has been calm, withdrawn, largely cooperative. Patient is poorly engaged in assessment today. Remains focused on discharge. She remains paranoid about her landlord but more agreeable to treatment, including AOT/ACT.

## 2022-08-30 NOTE — BH INPATIENT PSYCHIATRY PROGRESS NOTE - NSBHCHARTREVIEWVS_PSY_A_CORE FT
Vital Signs Last 24 Hrs  T(C): 37 (08-30-22 @ 15:53), Max: 37 (08-30-22 @ 15:53)  T(F): 98.6 (08-30-22 @ 15:53), Max: 98.6 (08-30-22 @ 15:53)  HR: --  BP: --  BP(mean): --  RR: 18 (08-29-22 @ 22:05) (18 - 18)  SpO2: 98% (08-29-22 @ 22:05) (98% - 98%)    Orthostatic VS  08-29-22 @ 22:05  Lying BP: --/-- HR: --  Sitting BP: 132/75 HR: 63  Standing BP: 113/78 HR: 68  Site: --  Mode: --  Orthostatic VS  08-29-22 @ 08:05  Lying BP: --/-- HR: --  Sitting BP: 119/77 HR: 61  Standing BP: 109/63 HR: 69  Site: --  Mode: --

## 2022-08-30 NOTE — BH INPATIENT PSYCHIATRY PROGRESS NOTE - MSE UNSTRUCTURED FT
Appearance: poor oral hygiene; no tremors or TD but there is odd posturing with her arms that is not new; there is no cogwheeling; gait is stable   Behavior: remains oddly related, no psychomotor retardation  Speech: low volume, normal rate, fluent, poor articulation  Mood: denies complaints  Affect: somber, mildly constricted, stable  Thought process: illogical, concrete  Thought content: paranoid delusions remain but not leading to outbursts at this time, no reports of SI  Perceptual disturbances: no appearance of internal preoccupation  Orientation: knows month, year but not day of week or date; knows name of hospital  Fund of knowledge: limited  Insight: poor  Judgement: somewhat improving     Appearance: poor oral hygiene; no tremors or TD but there is odd posturing with her arms that is not new; there is no cogwheeling; gait is stable   Behavior: poorly engaged, limited eye contact, no psychomotor retardation  Speech: low volume, normal rate, fluent, poor articulation  Mood: denies complaints  Affect: somewhat irritable, mildly constricted, stable  Thought process: illogical, concrete  Thought content: paranoid delusions remain but not leading to outbursts at this time, no reports of SI  Perceptual disturbances: no appearance of internal preoccupation  Orientation: knows month, year, name of hospital  Fund of knowledge: limited  Insight: poor  Judgement: somewhat improving

## 2022-08-30 NOTE — BH INPATIENT PSYCHIATRY PROGRESS NOTE - NSBHMETABOLIC_PSY_ALL_CORE_FT
BMI: BMI (kg/m2): 19.7 (08-27-22 @ 16:05)  HbA1c: A1C with Estimated Average Glucose Result: 5.9 % (06-19-22 @ 08:45)    Glucose:   BP: --  Lipid Panel: Date/Time: 06-19-22 @ 08:45  Cholesterol, Serum: 165  Direct LDL: --  HDL Cholesterol, Serum: 64  Total Cholesterol/HDL Ration Measurement: --  Triglycerides, Serum: 58

## 2022-08-31 LAB
ALBUMIN SERPL ELPH-MCNC: 4 G/DL — SIGNIFICANT CHANGE UP (ref 3.3–5)
ALP SERPL-CCNC: 91 U/L — SIGNIFICANT CHANGE UP (ref 40–120)
ALT FLD-CCNC: 15 U/L — SIGNIFICANT CHANGE UP (ref 4–33)
ANION GAP SERPL CALC-SCNC: 11 MMOL/L — SIGNIFICANT CHANGE UP (ref 7–14)
AST SERPL-CCNC: 20 U/L — SIGNIFICANT CHANGE UP (ref 4–32)
BILIRUB SERPL-MCNC: 0.4 MG/DL — SIGNIFICANT CHANGE UP (ref 0.2–1.2)
BUN SERPL-MCNC: 17 MG/DL — SIGNIFICANT CHANGE UP (ref 7–23)
CALCIUM SERPL-MCNC: 9.8 MG/DL — SIGNIFICANT CHANGE UP (ref 8.4–10.5)
CHLORIDE SERPL-SCNC: 100 MMOL/L — SIGNIFICANT CHANGE UP (ref 98–107)
CO2 SERPL-SCNC: 26 MMOL/L — SIGNIFICANT CHANGE UP (ref 22–31)
CREAT SERPL-MCNC: 0.99 MG/DL — SIGNIFICANT CHANGE UP (ref 0.5–1.3)
EGFR: 61 ML/MIN/1.73M2 — SIGNIFICANT CHANGE UP
GLUCOSE SERPL-MCNC: 100 MG/DL — HIGH (ref 70–99)
POTASSIUM SERPL-MCNC: 4.1 MMOL/L — SIGNIFICANT CHANGE UP (ref 3.5–5.3)
POTASSIUM SERPL-SCNC: 4.1 MMOL/L — SIGNIFICANT CHANGE UP (ref 3.5–5.3)
PROT SERPL-MCNC: 7.3 G/DL — SIGNIFICANT CHANGE UP (ref 6–8.3)
SODIUM SERPL-SCNC: 137 MMOL/L — SIGNIFICANT CHANGE UP (ref 135–145)

## 2022-08-31 PROCEDURE — 99232 SBSQ HOSP IP/OBS MODERATE 35: CPT

## 2022-08-31 RX ORDER — HALOPERIDOL DECANOATE 100 MG/ML
100 INJECTION INTRAMUSCULAR ONCE
Refills: 0 | Status: COMPLETED | OUTPATIENT
Start: 2022-08-31 | End: 2022-08-31

## 2022-08-31 RX ADMIN — Medication 1 MILLIGRAM(S): at 10:48

## 2022-08-31 RX ADMIN — CHLORHEXIDINE GLUCONATE 15 MILLILITER(S): 213 SOLUTION TOPICAL at 20:07

## 2022-08-31 RX ADMIN — HALOPERIDOL DECANOATE 100 MILLIGRAM(S): 100 INJECTION INTRAMUSCULAR at 19:40

## 2022-08-31 RX ADMIN — HALOPERIDOL DECANOATE 10 MILLIGRAM(S): 100 INJECTION INTRAMUSCULAR at 10:48

## 2022-08-31 RX ADMIN — HALOPERIDOL DECANOATE 10 MILLIGRAM(S): 100 INJECTION INTRAMUSCULAR at 20:07

## 2022-08-31 RX ADMIN — CHLORHEXIDINE GLUCONATE 15 MILLILITER(S): 213 SOLUTION TOPICAL at 10:48

## 2022-08-31 RX ADMIN — Medication 1 MILLIGRAM(S): at 20:07

## 2022-08-31 NOTE — BH INPATIENT PSYCHIATRY PROGRESS NOTE - NSBHASSESSSUMMFT_PSY_ALL_CORE
72 year old  woman, domiciled with daughter, hx of chronic paranoid schizophrenia with multiple prior psychiatric hospitalizations and long standing hx of medication nonadherence, no hx of suicide attempts or suicidality, BIB EMS activated by patient’s daughter due to confrontational behavior towards landlord due to paranoia towards him. She has had incidents like this in the past, also including paranoia towards her neighbors.  Daughter reports patient's behavior has been very concerning due to inability to care for self (severe weight loss) and confrontational behavior that gets her into trouble with people around her. TOO obtained - has been med compliant. Patient has had partial response to medications - has chronic paranoia about landlord, is guarded and illogical at times but more cooperative with assessments and aftercare planning. Has had no agitation. AOT/ACT being pursued and waiting for records to complete AOT application. She was switched to haldol and ANC is now normal.     Plan:    1) Disposition:   - continue inpatient care - needs enhanced aftercare planning in order for patient to be accepted back to live with family while having residual psychosis  - d/c to home once AOT/ACT established  2) Legal status: 9.27   3) Psychotropic medications:  - continue haldol 10mg po bid   - got haldol decanoate 50mg once 8/24/22 - will add another 100mg today  - will consider acquiring ingrezza for patient's TD though it has been improving since admission  - on TOO - haldol IM if patient refuses PO  4) Non-pharmacologic interventions:  - individual, group, milieu therapy as appropriate  5) Medical comorbidities:  - no acute needs other than gait instability  6) Work up:  - none  7) Social issues: patient now allowing care coordination with family and to obtain records from other providers; AOT application in progress  8) Psychoeducation: Risks and benefits discussed with patient, psychoeducation provided but patient not amenable

## 2022-08-31 NOTE — BH TREATMENT PLAN - NSTXDISORGGOALOTHER_PSY_ALL_CORE
Over the next week, pt will work on completing a safety plan including at least 2 warning signs for improved symptom management and sustained recovery.
Over the next week, pt will work on completing a safety plan including at least 2 warning signs for improved symptom management and sustained recovery.

## 2022-08-31 NOTE — BH INPATIENT PSYCHIATRY PROGRESS NOTE - MSE UNSTRUCTURED FT
Appearance: limited oral hygiene, poor dentition; no tremors; some TD of oral muscles and UE but not new; there is no cogwheeling; gait is stable   Behavior: guarded but fair limited eye contact, no psychomotor retardation  Speech: low volume, normal rate, fluent, poor articulation  Mood: denies complaints  Affect: somber, mildly constricted, stable  Thought process: illogical, concrete  Thought content: paranoid delusions remain but not leading to outbursts at this time, no reports of SI  Perceptual disturbances: no appearance of internal preoccupation  Orientation: knows date and name of hospital  Fund of knowledge: limited  Insight: poor  Judgement: somewhat improving

## 2022-08-31 NOTE — BH INPATIENT PSYCHIATRY PROGRESS NOTE - NSBHCHARTREVIEWLAB_PSY_A_CORE FT
ANC 1.57 - uptrending
CBC Full  -  ( 18 Aug 2022 08:32 )  WBC Count : 3.91 K/uL  RBC Count : 4.40 M/uL  Hemoglobin : 12.6 g/dL  Hematocrit : 41.4 %  Platelet Count - Automated : 231 K/uL  Mean Cell Volume : 94.1 fL  Mean Cell Hemoglobin : 28.6 pg  Mean Cell Hemoglobin Concentration : 30.4 gm/dL  Auto Neutrophil # : 1.75 K/uL  Auto Lymphocyte # : 1.60 K/uL  Auto Monocyte # : 0.43 K/uL  Auto Eosinophil # : 0.07 K/uL  Auto Basophil # : 0.05 K/uL  Auto Neutrophil % : 44.7 %  Auto Lymphocyte % : 40.9 %  Auto Monocyte % : 11.0 %  Auto Eosinophil % : 1.8 %  Auto Basophil % : 1.3 %  
110
ANC 1.76
ANC 2.38
ANC 1300 this morning
CMP unremarkable
ANC 1.68 this morning  CMP wnl
ANC 1.99
WBC 3.57  ANC 1.88

## 2022-08-31 NOTE — BH INPATIENT PSYCHIATRY PROGRESS NOTE - NSBHCHARTREVIEWVS_PSY_A_CORE FT
Vital Signs Last 24 Hrs  T(C): 37.1 (08-31-22 @ 15:43), Max: 37.1 (08-31-22 @ 15:43)  T(F): 98.7 (08-31-22 @ 15:43), Max: 98.7 (08-31-22 @ 15:43)  HR: --  BP: --  BP(mean): --  RR: --  SpO2: --    Orthostatic VS  08-31-22 @ 08:36  Lying BP: --/-- HR: --  Sitting BP: 108/70 HR: 72  Standing BP: 105/74 HR: 87  Site: --  Mode: --  Orthostatic VS  08-29-22 @ 22:05  Lying BP: --/-- HR: --  Sitting BP: 132/75 HR: 63  Standing BP: 113/78 HR: 68  Site: --  Mode: --

## 2022-08-31 NOTE — BH INPATIENT PSYCHIATRY PROGRESS NOTE - NSBHFUPINTERVALHXFT_PSY_A_CORE
Chart reviewed and case discussed with treatment team. Staff report patient has been withdrawn, somber. Patient is focused on discharge. She is evasive and guarded but seems willing to cooperative in order to be discharged. She maintains there is nothing wrong with her and does not even feel comfortable admitting that she needs reading glasses.

## 2022-08-31 NOTE — BH INPATIENT PSYCHIATRY PROGRESS NOTE - PRN MEDS
Initiate Treatment: Take Doxycyline 100mg twice daily x3 months Detail Level: Zone Render In Strict Bullet Format?: No Modify Regimen: AM\\n1.  Wash your face with a gentle cleanser.\\n2.  Pat skin dry\\n3. Apply thin layer of Aczone \\n3.  Apply an oil free moisturizer with SPF\\n\\nPM\\n1.  Wash your face with a gentle cleanser\\n2.  Pat skin dry\\n3.  Apply a thin layer of Altreno to the entire face. Start every other night then work up to nightly  \\n4.  Apply an oil free moisturizer as needed for dryness. \\n\\nRecommend ira mint mask 1-2 times per week. MEDICATIONS  (PRN):  haloperidol    Injectable 5 milliGRAM(s) IntraMuscular two times a day PRN if haldol PO refused

## 2022-09-01 PROCEDURE — 99231 SBSQ HOSP IP/OBS SF/LOW 25: CPT

## 2022-09-01 RX ADMIN — CHLORHEXIDINE GLUCONATE 15 MILLILITER(S): 213 SOLUTION TOPICAL at 08:58

## 2022-09-01 RX ADMIN — CHLORHEXIDINE GLUCONATE 15 MILLILITER(S): 213 SOLUTION TOPICAL at 20:17

## 2022-09-01 RX ADMIN — HALOPERIDOL DECANOATE 10 MILLIGRAM(S): 100 INJECTION INTRAMUSCULAR at 08:57

## 2022-09-01 RX ADMIN — Medication 1 MILLIGRAM(S): at 08:57

## 2022-09-01 RX ADMIN — HALOPERIDOL DECANOATE 10 MILLIGRAM(S): 100 INJECTION INTRAMUSCULAR at 20:17

## 2022-09-01 RX ADMIN — Medication 1 MILLIGRAM(S): at 20:17

## 2022-09-01 NOTE — BH INPATIENT PSYCHIATRY PROGRESS NOTE - NSBHFUPINTERVALHXFT_PSY_A_CORE
Chart reviewed and case discussed with treatment team. Staff report patient keeps to herself and is compliant with medications. She is found dozing off in the day room during groups. She is illogical and insists she was not sleeping and even stated she was participating in groups despite her being asleep in her chair. Patient remains guarded, mistrustful but impulse control is better.

## 2022-09-01 NOTE — BH INPATIENT PSYCHIATRY PROGRESS NOTE - NSBHPROGMEDSE_PSY_A_CORE FT
Currently noncompliant but has chronic TD.
Has chronic TD that has not worsened since admission but rather improved
Chronic TD but does not appear to have worsened with abilify initiation
Has chronic TD which has not worsened with abilify. 
Very mild bradykinesia noted. Patient herself denies any side effects. 
She presented with what appears to be TD at the beginning of her admission here. However she has not taken any psychotropic medications for an extended period of time for the past several years. They have decreased since starting neuroleptics but there is a chance it can worsen with chronic haldol treatment. Will need to be monitored as an outpatient.
Patient has chronic severe tardive dyskinesia 
Patient has chronic severe tardive dyskinesia 
Possible agranulocytosis due to risperidone therapy (now discontinued)
Possible sedation. 
Patient has chronic severe tardive dyskinesia 
Possible day time drowsiness
Diminished ANC due to risperidone - slow improvement, not yet recovered
Patient has chronic severe tardive dyskinesia 
Chronic TD prior to medication initiation - no appearance of worsening. 
Patient has chronic severe tardive dyskinesia 
Patient has chronic severe tardive dyskinesia 
Chronic TD
Appears to be drowsy - unclear if due to medications or poor sleep at night. Patient denies feeling drowsy at all. 
ANC slightly abnormal - 1.75 as of 8/18/22. Last dose of risperidone was 8/8/22. 
Chronic TD but unknown cause, was present on admission prior to initiating any psychotropic medications. 
TD at baseline, prior to initiation of medications. It does seem to be improving. 
Chronic TD - does not seem to be worse with 2 day abilify course so far
Chronic TD.
Chronic TD, somewhat reduced since abilify started
mild agranulocytosis
Chronic TD but not from current mediation regimen
Chronic TD, onset prior to current admission
chronic TD present prior to admission

## 2022-09-01 NOTE — BH INPATIENT PSYCHIATRY PROGRESS NOTE - NSBHASSESSSUMMFT_PSY_ALL_CORE
72 year old  woman, domiciled with daughter, hx of chronic paranoid schizophrenia with multiple prior psychiatric hospitalizations and long standing hx of medication nonadherence, no hx of suicide attempts or suicidality, BIB EMS activated by patient’s daughter due to confrontational behavior towards landlord due to paranoia towards him. She has had incidents like this in the past, also including paranoia towards her neighbors.  Daughter reports patient's behavior has been very concerning due to inability to care for self (severe weight loss) and confrontational behavior that gets her into trouble with people around her. TOO obtained - has been med compliant. Patient has had partial response to medications - has chronic paranoia about landlord, is guarded and illogical at times but more cooperative with assessments and aftercare planning. Has had no agitation. AOT/ACT being pursued and waiting for records to complete AOT application. She was switched to haldol and ANC is now normal.     Plan:    1) Disposition:   - continue inpatient care - needs enhanced aftercare planning in order for patient to be accepted back to live with family while having residual psychosis  - d/c to home once AOT/ACT established  2) Legal status: 9.27   3) Psychotropic medications:  - continue haldol 10mg po bid   - got haldol decanoate 50mg once 8/24/22, 100mg on 8/31/22  - will consider acquiring ingrezza for patient's TD though it has been improving since admission  - on TOO - haldol IM if patient refuses PO  4) Non-pharmacologic interventions:  - individual, group, milieu therapy as appropriate  5) Medical comorbidities:  - no acute needs other than gait instability  6) Work up:  - none  7) Social issues: patient now allowing care coordination with family and to obtain records from other providers; AOT application in progress  8) Psychoeducation: Risks and benefits discussed with patient, psychoeducation provided but patient not amenable

## 2022-09-01 NOTE — BH INPATIENT PSYCHIATRY PROGRESS NOTE - MSE UNSTRUCTURED FT
Appearance: limited oral hygiene, poor dentition; no tremors; some TD of oral muscles; no in UE today; there is no cogwheeling; gait is stable   Behavior: guarded, superficially related, defensive, no psychomotor retardation  Speech: low volume, normal rate, fluent, poor articulation  Mood: denies complaints  Affect: somber, mildly constricted, stable  Thought process: illogical, concrete  Thought content: paranoid delusions remain but not leading to outbursts at this time, no reports of SI  Perceptual disturbances: no appearance of internal preoccupation  Orientation: knows date and name of hospital  Fund of knowledge: limited  Insight: poor  Judgement: somewhat improving

## 2022-09-01 NOTE — BH INPATIENT PSYCHIATRY PROGRESS NOTE - NSBHCHARTREVIEWVS_PSY_A_CORE FT
Vital Signs Last 24 Hrs  T(C): 36.9 (09-01-22 @ 15:21), Max: 36.9 (09-01-22 @ 15:21)  T(F): 98.5 (09-01-22 @ 15:21), Max: 98.5 (09-01-22 @ 15:21)  HR: --  BP: --  BP(mean): --  RR: --  SpO2: --    Orthostatic VS  09-01-22 @ 08:37  Lying BP: --/-- HR: --  Sitting BP: 107/61 HR: 63  Standing BP: 103/61 HR: 73  Site: --  Mode: --  Orthostatic VS  08-31-22 @ 08:36  Lying BP: --/-- HR: --  Sitting BP: 108/70 HR: 72  Standing BP: 105/74 HR: 87  Site: --  Mode: --

## 2022-09-02 PROCEDURE — 99232 SBSQ HOSP IP/OBS MODERATE 35: CPT

## 2022-09-02 RX ADMIN — Medication 1 MILLIGRAM(S): at 20:44

## 2022-09-02 RX ADMIN — CHLORHEXIDINE GLUCONATE 15 MILLILITER(S): 213 SOLUTION TOPICAL at 20:44

## 2022-09-02 RX ADMIN — HALOPERIDOL DECANOATE 10 MILLIGRAM(S): 100 INJECTION INTRAMUSCULAR at 09:28

## 2022-09-02 RX ADMIN — Medication 1 MILLIGRAM(S): at 09:27

## 2022-09-02 RX ADMIN — CHLORHEXIDINE GLUCONATE 15 MILLILITER(S): 213 SOLUTION TOPICAL at 09:32

## 2022-09-02 RX ADMIN — HALOPERIDOL DECANOATE 10 MILLIGRAM(S): 100 INJECTION INTRAMUSCULAR at 20:44

## 2022-09-02 NOTE — BH INPATIENT PSYCHIATRY PROGRESS NOTE - NSBHCHARTREVIEWVS_PSY_A_CORE FT
Vital Signs Last 24 Hrs  T(C): 36.3 (09-02-22 @ 15:27), Max: 36.4 (09-02-22 @ 09:27)  T(F): 97.4 (09-02-22 @ 15:27), Max: 97.6 (09-02-22 @ 09:27)  HR: --  BP: --  BP(mean): --  RR: --  SpO2: --    Orthostatic VS  09-02-22 @ 09:27  Lying BP: --/-- HR: --  Sitting BP: 112/61 HR: 79  Standing BP: 108/60 HR: 92  Site: --  Mode: electronic  Orthostatic VS  09-01-22 @ 08:37  Lying BP: --/-- HR: --  Sitting BP: 107/61 HR: 63  Standing BP: 103/61 HR: 73  Site: --  Mode: --

## 2022-09-02 NOTE — BH INPATIENT PSYCHIATRY PROGRESS NOTE - NSBHASSESSSUMMFT_PSY_ALL_CORE
72 year old  woman, domiciled with daughter, hx of chronic paranoid schizophrenia with multiple prior psychiatric hospitalizations and long standing hx of medication nonadherence, no hx of suicide attempts or suicidality, BIB EMS activated by patient’s daughter due to confrontational behavior towards landlord due to paranoia towards him. She has had incidents like this in the past, also including paranoia towards her neighbors.  Daughter reports patient's behavior has been very concerning due to inability to care for self (severe weight loss) and confrontational behavior that gets her into trouble with people around her. TOO obtained - has been med compliant. Patient has had partial response to medications - has chronic paranoia about landlord, is guarded and illogical at times but more cooperative with assessments and aftercare planning. Has had no agitation. AOT/ACT being pursued and waiting for records to complete AOT application. She was switched to haldol and ANC is now normal.     Plan:    1) Disposition:   - continue inpatient care - needs enhanced aftercare planning in order for patient to be accepted back to live with family while having residual psychosis  - d/c to home once AOT/ACT established  2) Legal status: 9.27   3) Psychotropic medications:  - continue haldol 10mg po bid   - got haldol decanoate 50mg once 8/24/22, 100mg on 8/31/22  - will consider acquiring ingrezza for patient's TD though it has been improving since admission  - on TOO - haldol IM if patient refuses PO  4) Non-pharmacologic interventions:  - individual, group, milieu therapy as appropriate  5) Medical comorbidities:  - no acute needs other than gait instability  6) Work up:  - none  7) Social issues: patient now allowing care coordination with family and to obtain records from other providers; AOT application in progress  8) Psychoeducation: Risks and benefits discussed with patient, psychoeducation provided but patient not amenable 72 year old  woman, domiciled with daughter, hx of chronic paranoid schizophrenia with multiple prior psychiatric hospitalizations and long standing hx of medication nonadherence, no hx of suicide attempts or suicidality, BIB EMS activated by patient’s daughter due to confrontational behavior towards landlord due to paranoia towards him. She has had incidents like this in the past, also including paranoia towards her neighbors.  Daughter reports patient's behavior has been very concerning due to inability to care for self (severe weight loss) and confrontational behavior that gets her into trouble with people around her. TOO obtained - has been med compliant. Patient has had partial response to medications - has chronic paranoia about landlord, is guarded and illogical at times but more cooperative with assessments and aftercare planning. Has had no agitation. AOT/ACT being pursued - application submitted. Tried on abilify (limited response), risperidone (agranulocytosis). She was switched to haldol and ANC is now normal.     Plan:    1) Disposition:   - continue inpatient care - needs enhanced aftercare planning in order for patient to be accepted back to live with family while having residual psychosis  - d/c to home once AOT/ACT established  2) Legal status: 9.27   3) Psychotropic medications:  - continue haldol 10mg po bid   - got haldol decanoate 50mg once 8/24/22, 100mg on 8/31/22  - will consider acquiring ingrezza for patient's TD though it has been improving since admission  - on TOO - haldol IM if patient refuses PO  4) Non-pharmacologic interventions:  - individual, group, milieu therapy as appropriate  5) Medical comorbidities:  - no acute needs other than gait instability  6) Work up:  - none  7) Social issues: patient now allowing care coordination with family and to obtain records from other providers; AOT application in progress  8) Psychoeducation: Risks and benefits discussed with patient, psychoeducation provided but patient not amenable

## 2022-09-02 NOTE — BH INPATIENT PSYCHIATRY PROGRESS NOTE - MSE UNSTRUCTURED FT
Appearance: limited oral hygiene, poor dentition; no tremors; some TD of oral muscles; no in UE today; there is no cogwheeling; gait is stable   Behavior: guarded, superficially related, defensive, no psychomotor retardation  Speech: low volume, normal rate, fluent, poor articulation  Mood: denies complaints  Affect: somber, mildly constricted, stable  Thought process: illogical, concrete  Thought content: paranoid delusions remain but not leading to outbursts at this time, no reports of SI  Perceptual disturbances: no appearance of internal preoccupation  Orientation: knows date and name of hospital  Fund of knowledge: limited  Insight: poor  Judgement: somewhat improving     Appearance: poor dentition and oral hygiene; no tremors; some TD of oral muscles and hands; no cogwheeling; gait is stable   Behavior: guarded, withdrawn, defensive, no psychomotor retardation  Speech: low volume, normal rate, fluent, poor articulation  Mood: denies complaints  Affect: somber, constricted, stable  Thought process: illogical, concrete  Thought content: paranoid delusions remain but not spontaneous; denies SI  Perceptual disturbances: denies AH/VH  Orientation: well oriented  Fund of knowledge: limited  Insight: poor  Judgement: impulse control intact but does make poor decisions about treatment

## 2022-09-02 NOTE — BH INPATIENT PSYCHIATRY PROGRESS NOTE - NSBHFUPINTERVALHXFT_PSY_A_CORE
Chart reviewed and case discussed with treatment team. Staff report patient has been withdrawn but calm and compliant.    Chart reviewed and case discussed with treatment team. Staff report patient has been withdrawn, guarded. Not participating in groups. Med compliant. Patient is illogical and refuses to acknowledge abnormalities. Writer noted some abnormal posturing to which she stated "I'm just exercising." When noting she was falling asleep when writer approached, she denies she was sleeping. Denies having any drowsiness from medications. Paranoia about landlord persists but she does not bring it up spontaneously.

## 2022-09-03 RX ADMIN — Medication 1 MILLIGRAM(S): at 20:21

## 2022-09-03 RX ADMIN — CHLORHEXIDINE GLUCONATE 15 MILLILITER(S): 213 SOLUTION TOPICAL at 09:41

## 2022-09-03 RX ADMIN — CHLORHEXIDINE GLUCONATE 15 MILLILITER(S): 213 SOLUTION TOPICAL at 20:20

## 2022-09-03 RX ADMIN — HALOPERIDOL DECANOATE 10 MILLIGRAM(S): 100 INJECTION INTRAMUSCULAR at 20:20

## 2022-09-03 RX ADMIN — HALOPERIDOL DECANOATE 10 MILLIGRAM(S): 100 INJECTION INTRAMUSCULAR at 09:42

## 2022-09-03 RX ADMIN — Medication 1 MILLIGRAM(S): at 09:41

## 2022-09-04 RX ADMIN — Medication 1 MILLIGRAM(S): at 20:08

## 2022-09-04 RX ADMIN — HALOPERIDOL DECANOATE 10 MILLIGRAM(S): 100 INJECTION INTRAMUSCULAR at 09:32

## 2022-09-04 RX ADMIN — Medication 1 MILLIGRAM(S): at 09:32

## 2022-09-04 RX ADMIN — CHLORHEXIDINE GLUCONATE 15 MILLILITER(S): 213 SOLUTION TOPICAL at 20:08

## 2022-09-04 RX ADMIN — HALOPERIDOL DECANOATE 10 MILLIGRAM(S): 100 INJECTION INTRAMUSCULAR at 20:08

## 2022-09-04 RX ADMIN — CHLORHEXIDINE GLUCONATE 15 MILLILITER(S): 213 SOLUTION TOPICAL at 09:32

## 2022-09-05 RX ADMIN — CHLORHEXIDINE GLUCONATE 15 MILLILITER(S): 213 SOLUTION TOPICAL at 20:10

## 2022-09-05 RX ADMIN — HALOPERIDOL DECANOATE 10 MILLIGRAM(S): 100 INJECTION INTRAMUSCULAR at 09:38

## 2022-09-05 RX ADMIN — Medication 1 MILLIGRAM(S): at 20:10

## 2022-09-05 RX ADMIN — Medication 1 MILLIGRAM(S): at 09:36

## 2022-09-05 RX ADMIN — CHLORHEXIDINE GLUCONATE 15 MILLILITER(S): 213 SOLUTION TOPICAL at 09:38

## 2022-09-05 RX ADMIN — HALOPERIDOL DECANOATE 10 MILLIGRAM(S): 100 INJECTION INTRAMUSCULAR at 20:11

## 2022-09-06 PROCEDURE — 99232 SBSQ HOSP IP/OBS MODERATE 35: CPT

## 2022-09-06 RX ADMIN — Medication 1 MILLIGRAM(S): at 20:25

## 2022-09-06 RX ADMIN — CHLORHEXIDINE GLUCONATE 15 MILLILITER(S): 213 SOLUTION TOPICAL at 20:25

## 2022-09-06 RX ADMIN — Medication 1 MILLIGRAM(S): at 09:15

## 2022-09-06 RX ADMIN — CHLORHEXIDINE GLUCONATE 15 MILLILITER(S): 213 SOLUTION TOPICAL at 09:16

## 2022-09-06 RX ADMIN — HALOPERIDOL DECANOATE 10 MILLIGRAM(S): 100 INJECTION INTRAMUSCULAR at 09:16

## 2022-09-06 RX ADMIN — HALOPERIDOL DECANOATE 10 MILLIGRAM(S): 100 INJECTION INTRAMUSCULAR at 20:25

## 2022-09-06 NOTE — BH INPATIENT PSYCHIATRY PROGRESS NOTE - NSBHMETABOLIC_PSY_ALL_CORE_FT
BMI: BMI (kg/m2): 19.7 (08-27-22 @ 16:05)  HbA1c: A1C with Estimated Average Glucose Result: 5.9 % (06-19-22 @ 08:45)    Glucose:   BP: 114/78 (09-05-22 @ 07:59) (114/78 - 114/78)  Lipid Panel: Date/Time: 06-19-22 @ 08:45  Cholesterol, Serum: 165  Direct LDL: --  HDL Cholesterol, Serum: 64  Total Cholesterol/HDL Ration Measurement: --  Triglycerides, Serum: 58

## 2022-09-06 NOTE — BH INPATIENT PSYCHIATRY PROGRESS NOTE - MSE UNSTRUCTURED FT
Appearance: poor dentition and oral hygiene; no tremors; some TD of oral muscles and hands; no cogwheeling; gait is stable   Behavior: guarded, withdrawn, defensive, no psychomotor retardation  Speech: low volume, normal rate, fluent, poor articulation  Mood: denies complaints  Affect: somber, constricted, stable  Thought process: illogical, concrete  Thought content: paranoid delusions remain but not spontaneous reported; denies SI/HI  Perceptual disturbances: denies AH/VH  Orientation: well oriented  Fund of knowledge: limited  Insight: poor  Judgement: impulse control intact but does make poor decisions about treatment

## 2022-09-06 NOTE — BH INPATIENT PSYCHIATRY PROGRESS NOTE - NSBHCHARTREVIEWVS_PSY_A_CORE FT
Vital Signs Last 24 Hrs  T(C): 36.2 (09-06-22 @ 08:01), Max: 36.7 (09-05-22 @ 15:39)  T(F): 97.2 (09-06-22 @ 08:01), Max: 98 (09-05-22 @ 15:39)  HR: --  BP: --  BP(mean): --  RR: --  SpO2: --    Orthostatic VS  09-06-22 @ 08:01  Lying BP: --/-- HR: --  Sitting BP: 120/67 HR: 76  Standing BP: 116/58 HR: 58  Site: --  Mode: --

## 2022-09-06 NOTE — BH INPATIENT PSYCHIATRY PROGRESS NOTE - NSBHFUPINTERVALHXFT_PSY_A_CORE
Chart reviewed and case discussed with treatment team, patient seen for treatment of psychosis. Staff report patient has been withdrawn, guarded. Not participating in groups. Med compliant. Patient is illogical and refuses to acknowledge abnormalities.VSS.

## 2022-09-06 NOTE — BH INPATIENT PSYCHIATRY PROGRESS NOTE - NSBHASSESSSUMMFT_PSY_ALL_CORE
72 year old  woman, domiciled with daughter, hx of chronic paranoid schizophrenia with multiple prior psychiatric hospitalizations and long standing hx of medication nonadherence, no hx of suicide attempts or suicidality, BIB EMS activated by patient’s daughter due to confrontational behavior towards landlord due to paranoia towards him. She has had incidents like this in the past, also including paranoia towards her neighbors.  Daughter reports patient's behavior has been very concerning due to inability to care for self (severe weight loss) and confrontational behavior that gets her into trouble with people around her. TOO obtained - has been med compliant. Patient has had partial response to medications - has chronic paranoia about landlord, is guarded and illogical at times but more cooperative with assessments and aftercare planning. Has had no agitation. AOT/ACT being pursued - application submitted. Tried on abilify (limited response), risperidone (agranulocytosis). She was switched to haldol and ANC is now normal.     Plan:    1) Disposition:   - continue inpatient care - needs enhanced aftercare planning in order for patient to be accepted back to live with family while having residual psychosis  - d/c to home once AOT/ACT established  2) Legal status: 9.27   3) Psychotropic medications:  - continue haldol 10mg po bid   - got haldol decanoate 50mg once 8/24/22, 100mg on 8/31/22  - will consider acquiring ingrezza for patient's TD though it has been improving since admission  - on TOO - haldol IM if patient refuses PO  4) Non-pharmacologic interventions:  - individual, group, milieu therapy as appropriate  5) Medical comorbidities:  - no acute needs other than gait instability  6) Work up:  - none  7) Social issues: patient now allowing care coordination with family and to obtain records from other providers; AOT application in progress  8) Psychoeducation: Risks and benefits discussed with patient, psychoeducation provided but patient not amenable  9/6 Patient guarded, suspicious, disorganized but in control, accepted [facility]. Cont current treatment

## 2022-09-07 PROCEDURE — 99231 SBSQ HOSP IP/OBS SF/LOW 25: CPT

## 2022-09-07 RX ADMIN — HALOPERIDOL DECANOATE 10 MILLIGRAM(S): 100 INJECTION INTRAMUSCULAR at 09:47

## 2022-09-07 RX ADMIN — Medication 1 MILLIGRAM(S): at 09:47

## 2022-09-07 RX ADMIN — Medication 1 MILLIGRAM(S): at 20:19

## 2022-09-07 RX ADMIN — CHLORHEXIDINE GLUCONATE 15 MILLILITER(S): 213 SOLUTION TOPICAL at 20:18

## 2022-09-07 RX ADMIN — HALOPERIDOL DECANOATE 10 MILLIGRAM(S): 100 INJECTION INTRAMUSCULAR at 20:19

## 2022-09-07 RX ADMIN — CHLORHEXIDINE GLUCONATE 15 MILLILITER(S): 213 SOLUTION TOPICAL at 09:48

## 2022-09-07 NOTE — BH INPATIENT PSYCHIATRY PROGRESS NOTE - MSE UNSTRUCTURED FT
Appearance: poor dentition and oral hygiene; no tremors; some TD of oral muscles and hands; no cogwheeling; gait is stable   Behavior: guarded, withdrawn, defensive, no psychomotor retardation  Speech: low volume, normal rate, fluent, poor articulation  Mood: "ok"  Affect: somber, constricted, stable  Thought process: illogical, concrete  Thought content: paranoid delusions likely remain but patient denies; denies SI/HI  Perceptual disturbances: denies AH/VH  Orientation: well oriented  Fund of knowledge: limited  Insight: poor  Judgement: impulse control intact but does make poor decisions about treatment

## 2022-09-07 NOTE — BH INPATIENT PSYCHIATRY PROGRESS NOTE - NSBHCHARTREVIEWVS_PSY_A_CORE FT
Vital Signs Last 24 Hrs  T(C): 36.1 (09-07-22 @ 08:11), Max: 36.9 (09-06-22 @ 15:48)  T(F): 97 (09-07-22 @ 08:11), Max: 98.4 (09-06-22 @ 15:48)  HR: --  BP: --  BP(mean): --  RR: 18 (09-07-22 @ 08:11) (18 - 18)  SpO2: --    Orthostatic VS  09-07-22 @ 08:11  Lying BP: --/-- HR: --  Sitting BP: 123/68 HR: 65  Standing BP: 118/70 HR: 66  Site: --  Mode: --  Orthostatic VS  09-06-22 @ 08:01  Lying BP: --/-- HR: --  Sitting BP: 120/67 HR: 76  Standing BP: 116/58 HR: 58  Site: --  Mode: --

## 2022-09-07 NOTE — BH INPATIENT PSYCHIATRY PROGRESS NOTE - NSBHFUPINTERVALHXFT_PSY_A_CORE
Chart reviewed and case discussed with treatment team, patient seen for treatment of psychosis.  Staff report patient has been withdrawn, guarded, but visible on the unit. Not participating in groups. She has been med compliant.  She continues to minimize any problems, but denies any depression, SI, or paranoia today.  She has been tolerating medications well, denies side effects.  Discharge oriented.

## 2022-09-07 NOTE — BH INPATIENT PSYCHIATRY PROGRESS NOTE - NSBHASSESSSUMMFT_PSY_ALL_CORE
72 year old  woman, domiciled with daughter, hx of chronic paranoid schizophrenia with multiple prior psychiatric hospitalizations and long standing hx of medication nonadherence, no hx of suicide attempts or suicidality, BIB EMS activated by patient’s daughter due to confrontational behavior towards landlord due to paranoia towards him. She has had incidents like this in the past, also including paranoia towards her neighbors.  Daughter reports patient's behavior has been very concerning due to inability to care for self (severe weight loss) and confrontational behavior that gets her into trouble with people around her. TOO obtained - has been med compliant. Patient has had partial response to medications - has chronic paranoia about landlord, is guarded and illogical at times but more cooperative with assessments and aftercare planning. Has had no agitation. AOT/ACT being pursued - application submitted. Tried on abilify (limited response), risperidone (agranulocytosis). She was switched to haldol and ANC is now normal.     9/7: The patient remains guarded, minimizing symptoms.  She is guarded, suspicious, but in control, accepted [facility]. Cont current treatment    Plan:  1) Disposition:   - continue inpatient care - needs enhanced aftercare planning in order for patient to be accepted back to live with family while having residual psychosis  - d/c to home once AOT/ACT established  2) Legal status: 9.27   3) Psychotropic medications:  - continue haldol 10mg po bid   - got haldol decanoate 50mg once 8/24/22, 100mg on 8/31/22  - will consider acquiring ingrezza for patient's TD though it has been improving since admission  - on TOO - haldol IM if patient refuses PO  4) Non-pharmacologic interventions:  - individual, group, milieu therapy as appropriate  5) Medical comorbidities:  - no acute needs other than gait instability  6) Work up:  - none  7) Social issues: patient now allowing care coordination with family and to obtain records from other providers; AOT application in progress  8) Psychoeducation: Risks and benefits discussed with patient, psychoeducation provided but patient not amenable

## 2022-09-08 PROCEDURE — 99231 SBSQ HOSP IP/OBS SF/LOW 25: CPT

## 2022-09-08 RX ADMIN — HALOPERIDOL DECANOATE 10 MILLIGRAM(S): 100 INJECTION INTRAMUSCULAR at 20:55

## 2022-09-08 RX ADMIN — HALOPERIDOL DECANOATE 10 MILLIGRAM(S): 100 INJECTION INTRAMUSCULAR at 08:56

## 2022-09-08 RX ADMIN — CHLORHEXIDINE GLUCONATE 15 MILLILITER(S): 213 SOLUTION TOPICAL at 20:55

## 2022-09-08 RX ADMIN — Medication 1 MILLIGRAM(S): at 08:56

## 2022-09-08 RX ADMIN — CHLORHEXIDINE GLUCONATE 15 MILLILITER(S): 213 SOLUTION TOPICAL at 08:57

## 2022-09-08 RX ADMIN — Medication 1 MILLIGRAM(S): at 20:55

## 2022-09-08 NOTE — BH INPATIENT PSYCHIATRY PROGRESS NOTE - NSBHASSESSSUMMFT_PSY_ALL_CORE
72 year old  woman, domiciled with daughter, hx of chronic paranoid schizophrenia with multiple prior psychiatric hospitalizations and long standing hx of medication nonadherence, no hx of suicide attempts or suicidality, BIB EMS activated by patient’s daughter due to confrontational behavior towards landlord due to paranoia towards him. She has had incidents like this in the past, also including paranoia towards her neighbors.  Daughter reports patient's behavior has been very concerning due to inability to care for self (severe weight loss) and confrontational behavior that gets her into trouble with people around her. TOO obtained - has been med compliant. Patient has had partial response to medications - has chronic paranoia about landlord, is guarded and illogical at times but more cooperative with assessments and aftercare planning. Has had no agitation. AOT/ACT being pursued - application submitted. Tried on abilify (limited response), risperidone (agranulocytosis). She was switched to haldol and ANC is now normal.     9/7: The patient remains guarded, minimizing symptoms.  She is guarded, suspicious, but in control, accepted [facility]. Cont current treatment  9/8 Guarded impoverished but in control cooperative cont current treatment plan     Plan:  1) Disposition:   - continue inpatient care - needs enhanced aftercare planning in order for patient to be accepted back to live with family while having residual psychosis  - d/c to home once AOT/ACT established  2) Legal status: 9.27   3) Psychotropic medications:  - continue haldol 10mg po bid   - got haldol decanoate 50mg once 8/24/22, 100mg on 8/31/22  - will consider acquiring ingrezza for patient's TD though it has been improving since admission  - on TOO - haldol IM if patient refuses PO  4) Non-pharmacologic interventions:  - individual, group, milieu therapy as appropriate  5) Medical comorbidities:  - no acute needs other than gait instability  6) Work up:  - none  7) Social issues: patient now allowing care coordination with family and to obtain records from other providers; AOT application in progress  8) Psychoeducation: Risks and benefits discussed with patient, psychoeducation provided but patient not amenable

## 2022-09-08 NOTE — BH INPATIENT PSYCHIATRY PROGRESS NOTE - NSBHCHARTREVIEWVS_PSY_A_CORE FT
Vital Signs Last 24 Hrs  T(C): 35.6 (09-08-22 @ 08:26), Max: 35.6 (09-08-22 @ 08:26)  T(F): 96.1 (09-08-22 @ 08:26), Max: 96.1 (09-08-22 @ 08:26)  HR: 68 (09-08-22 @ 08:26) (68 - 68)  BP: 101/72 (09-08-22 @ 08:26) (101/72 - 101/72)  BP(mean): --  RR: 18 (09-08-22 @ 08:26) (18 - 18)  SpO2: 99% (09-08-22 @ 08:26) (99% - 99%)    Orthostatic VS  09-07-22 @ 08:11  Lying BP: --/-- HR: --  Sitting BP: 123/68 HR: 65  Standing BP: 118/70 HR: 66  Site: --  Mode: --

## 2022-09-08 NOTE — BH INPATIENT PSYCHIATRY PROGRESS NOTE - NSBHFUPINTERVALHXFT_PSY_A_CORE
Chart reviewed and case discussed with treatment team, patient seen for treatment of psychosis.  Staff report patient has been withdrawn, guarded, but visible on the unit. Not participating in groups. She has been med compliant.  She continues to minimize any problems, but denies any depression, SI, or paranoia  She has been tolerating medications well, denies side effects.  Took shower today

## 2022-09-08 NOTE — BH INPATIENT PSYCHIATRY PROGRESS NOTE - NSBHMETABOLIC_PSY_ALL_CORE_FT
BMI: BMI (kg/m2): 19.7 (08-27-22 @ 16:05)  HbA1c: A1C with Estimated Average Glucose Result: 5.9 % (06-19-22 @ 08:45)    Glucose:   BP: 101/72 (09-08-22 @ 08:26) (101/72 - 101/72)  Lipid Panel: Date/Time: 06-19-22 @ 08:45  Cholesterol, Serum: 165  Direct LDL: --  HDL Cholesterol, Serum: 64  Total Cholesterol/HDL Ration Measurement: --  Triglycerides, Serum: 58

## 2022-09-09 PROCEDURE — 99231 SBSQ HOSP IP/OBS SF/LOW 25: CPT

## 2022-09-09 RX ADMIN — Medication 1 MILLIGRAM(S): at 09:19

## 2022-09-09 RX ADMIN — HALOPERIDOL DECANOATE 10 MILLIGRAM(S): 100 INJECTION INTRAMUSCULAR at 22:02

## 2022-09-09 RX ADMIN — HALOPERIDOL DECANOATE 10 MILLIGRAM(S): 100 INJECTION INTRAMUSCULAR at 09:19

## 2022-09-09 RX ADMIN — Medication 1 MILLIGRAM(S): at 22:03

## 2022-09-09 RX ADMIN — CHLORHEXIDINE GLUCONATE 15 MILLILITER(S): 213 SOLUTION TOPICAL at 09:19

## 2022-09-09 RX ADMIN — CHLORHEXIDINE GLUCONATE 15 MILLILITER(S): 213 SOLUTION TOPICAL at 22:03

## 2022-09-09 NOTE — BH INPATIENT PSYCHIATRY PROGRESS NOTE - NSBHFUPINTERVALHXFT_PSY_A_CORE
Chart reviewed and case discussed with treatment team, patient seen for treatment of psychosis.  Staff report patient has been withdrawn, guarded, but visible on the unit. Not participating in groups. She has been med compliant.  She continues to minimize any problems, but denies any depression, SI, or paranoia  She has been tolerating medications well, denies side effects.  Focused on when she will be d/familia

## 2022-09-09 NOTE — BH INPATIENT PSYCHIATRY PROGRESS NOTE - NSBHASSESSSUMMFT_PSY_ALL_CORE
72 year old  woman, domiciled with daughter, hx of chronic paranoid schizophrenia with multiple prior psychiatric hospitalizations and long standing hx of medication nonadherence, no hx of suicide attempts or suicidality, BIB EMS activated by patient’s daughter due to confrontational behavior towards landlord due to paranoia towards him. She has had incidents like this in the past, also including paranoia towards her neighbors.  Daughter reports patient's behavior has been very concerning due to inability to care for self (severe weight loss) and confrontational behavior that gets her into trouble with people around her. TOO obtained - has been med compliant. Patient has had partial response to medications - has chronic paranoia about landlord, is guarded and illogical at times but more cooperative with assessments and aftercare planning. Has had no agitation. AOT/ACT being pursued - application submitted. Tried on abilify (limited response), risperidone (agranulocytosis). She was switched to haldol and ANC is now normal.     9/7: The patient remains guarded, minimizing symptoms.  She is guarded, suspicious, but in control, accepted [facility]. Cont current treatment  9/8 Guarded impoverished but in control cooperative cont current treatment plan   9/9 Improved, still suspicious, in control on unit. Cont current meds and d/c planning with AOT    Plan:  1) Disposition:   - continue inpatient care - needs enhanced aftercare planning in order for patient to be accepted back to live with family while having residual psychosis  - d/c to home once AOT/ACT established  2) Legal status: 9.27   3) Psychotropic medications:  - continue haldol 10mg po bid   - got haldol decanoate 50mg once 8/24/22, 100mg on 8/31/22  - will consider acquiring ingrezza for patient's TD though it has been improving since admission  - on TOO - haldol IM if patient refuses PO  4) Non-pharmacologic interventions:  - individual, group, milieu therapy as appropriate  5) Medical comorbidities:  - no acute needs other than gait instability  6) Work up:  - none  7) Social issues: patient now allowing care coordination with family and to obtain records from other providers; AOT application in progress  8) Psychoeducation: Risks and benefits discussed with patient, psychoeducation provided but patient not amenable

## 2022-09-09 NOTE — BH INPATIENT PSYCHIATRY PROGRESS NOTE - NSBHCHARTREVIEWVS_PSY_A_CORE FT
Vital Signs Last 24 Hrs  T(C): 37.3 (09-09-22 @ 15:32), Max: 37.3 (09-08-22 @ 16:53)  T(F): 99.1 (09-09-22 @ 15:32), Max: 99.2 (09-08-22 @ 16:53)  HR: --  BP: --  BP(mean): --  RR: 18 (09-09-22 @ 06:22) (18 - 18)  SpO2: --    Orthostatic VS  09-09-22 @ 06:22  Lying BP: 110/66 HR: 62  Sitting BP: 119/67 HR: 67  Standing BP: --/-- HR: --  Site: --  Mode: --

## 2022-09-10 RX ADMIN — CHLORHEXIDINE GLUCONATE 15 MILLILITER(S): 213 SOLUTION TOPICAL at 09:40

## 2022-09-10 RX ADMIN — Medication 1 MILLIGRAM(S): at 09:40

## 2022-09-10 RX ADMIN — HALOPERIDOL DECANOATE 10 MILLIGRAM(S): 100 INJECTION INTRAMUSCULAR at 09:40

## 2022-09-10 RX ADMIN — Medication 1 MILLIGRAM(S): at 21:18

## 2022-09-10 RX ADMIN — HALOPERIDOL DECANOATE 10 MILLIGRAM(S): 100 INJECTION INTRAMUSCULAR at 21:18

## 2022-09-10 RX ADMIN — CHLORHEXIDINE GLUCONATE 15 MILLILITER(S): 213 SOLUTION TOPICAL at 21:18

## 2022-09-11 RX ADMIN — HALOPERIDOL DECANOATE 10 MILLIGRAM(S): 100 INJECTION INTRAMUSCULAR at 21:02

## 2022-09-11 RX ADMIN — CHLORHEXIDINE GLUCONATE 15 MILLILITER(S): 213 SOLUTION TOPICAL at 09:32

## 2022-09-11 RX ADMIN — CHLORHEXIDINE GLUCONATE 15 MILLILITER(S): 213 SOLUTION TOPICAL at 21:02

## 2022-09-11 RX ADMIN — HALOPERIDOL DECANOATE 10 MILLIGRAM(S): 100 INJECTION INTRAMUSCULAR at 09:32

## 2022-09-11 RX ADMIN — Medication 1 MILLIGRAM(S): at 21:02

## 2022-09-11 RX ADMIN — Medication 1 MILLIGRAM(S): at 09:32

## 2022-09-12 PROCEDURE — 99231 SBSQ HOSP IP/OBS SF/LOW 25: CPT

## 2022-09-12 RX ADMIN — HALOPERIDOL DECANOATE 10 MILLIGRAM(S): 100 INJECTION INTRAMUSCULAR at 08:17

## 2022-09-12 RX ADMIN — CHLORHEXIDINE GLUCONATE 15 MILLILITER(S): 213 SOLUTION TOPICAL at 08:18

## 2022-09-12 RX ADMIN — HALOPERIDOL DECANOATE 10 MILLIGRAM(S): 100 INJECTION INTRAMUSCULAR at 20:04

## 2022-09-12 RX ADMIN — CHLORHEXIDINE GLUCONATE 15 MILLILITER(S): 213 SOLUTION TOPICAL at 20:04

## 2022-09-12 RX ADMIN — Medication 1 MILLIGRAM(S): at 20:05

## 2022-09-12 RX ADMIN — Medication 1 MILLIGRAM(S): at 08:18

## 2022-09-12 NOTE — BH INPATIENT PSYCHIATRY PROGRESS NOTE - NSBHMETABOLIC_PSY_ALL_CORE_FT
BMI: BMI (kg/m2): 19.6 (09-10-22 @ 15:45)  HbA1c: A1C with Estimated Average Glucose Result: 5.9 % (06-19-22 @ 08:45)    Glucose:   BP: --  Lipid Panel: Date/Time: 06-19-22 @ 08:45  Cholesterol, Serum: 165  Direct LDL: --  HDL Cholesterol, Serum: 64  Total Cholesterol/HDL Ration Measurement: --  Triglycerides, Serum: 58

## 2022-09-12 NOTE — BH INPATIENT PSYCHIATRY PROGRESS NOTE - NSBHCHARTREVIEWVS_PSY_A_CORE FT
Vital Signs Last 24 Hrs  T(C): 36.3 (09-12-22 @ 15:28), Max: 36.3 (09-12-22 @ 15:28)  T(F): 97.4 (09-12-22 @ 15:28), Max: 97.4 (09-12-22 @ 15:28)  HR: --  BP: --  BP(mean): --  RR: --  SpO2: --    Orthostatic VS  09-11-22 @ 08:51  Lying BP: --/-- HR: --  Sitting BP: 117/75 HR: 71  Standing BP: 105/75 HR: 82  Site: --  Mode: --

## 2022-09-12 NOTE — BH INPATIENT PSYCHIATRY PROGRESS NOTE - NSBHASSESSSUMMFT_PSY_ALL_CORE
72 year old  woman, domiciled with daughter, hx of chronic paranoid schizophrenia with multiple prior psychiatric hospitalizations and long standing hx of medication nonadherence, no hx of suicide attempts or suicidality, BIB EMS activated by patient’s daughter due to confrontational behavior towards landlord due to paranoia towards him. She has had incidents like this in the past, also including paranoia towards her neighbors.  Daughter reports patient's behavior has been very concerning due to inability to care for self (severe weight loss) and confrontational behavior that gets her into trouble with people around her. TOO obtained - has been med compliant. Patient has had partial response to medications - has chronic paranoia about landlord, is guarded and illogical at times but more cooperative with assessments and aftercare planning. Has had no agitation. AOT/ACT being pursued - application submitted. Tried on abilify (limited response), risperidone (agranulocytosis). She was switched to haldol and ANC is now normal.     Plan:    1) Disposition:   - continue inpatient care - needs enhanced aftercare planning in order for patient to be accepted back to live with family while having residual psychosis  - d/c to home once AOT/ACT established  2) Legal status: 9.27   3) Psychotropic medications:  - continue haldol 10mg po bid   - got haldol decanoate 50mg once 8/24/22, 100mg on 8/31/22  - will consider acquiring ingrezza for patient's TD though it has been improving since admission  - on TOO - haldol IM if patient refuses PO  4) Non-pharmacologic interventions:  - individual, group, milieu therapy as appropriate  5) Medical comorbidities:  - no acute needs other than gait instability  6) Work up:  - none  7) Social issues: patient now allowing care coordination with family and to obtain records from other providers; AOT application in progress  8) Psychoeducation: Risks and benefits discussed with patient, psychoeducation provided but patient not amenable

## 2022-09-12 NOTE — BH INPATIENT PSYCHIATRY PROGRESS NOTE - NSBHFUPINTERVALHXFT_PSY_A_CORE
Chart reviewed and case discussed with treatment team. Staff report    Chart reviewed and case discussed with treatment team. Staff report patient has been calm, withdrawn, appearing depressed at times. Patient remains focused on discharge. She remains guarded, illogical. She worries about her bills not being paid but unwilling to allow treatment team to help. She has poor awareness about treatment needs.

## 2022-09-12 NOTE — BH INPATIENT PSYCHIATRY PROGRESS NOTE - MSE UNSTRUCTURED FT
Appearance: poor dentition and oral hygiene; no tremors; some TD of oral muscles and hands; no cogwheeling; gait is stable   Behavior: guarded, withdrawn, defensive, no psychomotor retardation  Speech: low volume, normal rate, fluent, poor articulation  Mood: "ok"  Affect: somber, constricted, stable  Thought process: illogical, concrete  Thought content: paranoid delusions likely remain but patient denies; denies SI/HI  Perceptual disturbances: denies AH/VH  Orientation: well oriented  Fund of knowledge: limited  Insight: poor  Judgement: impulse control intact but does make poor decisions about treatment     Appearance: poor dentition and oral hygiene; no tremors; some TD of oral muscles and hands; no cogwheeling; gait is stable   Behavior: guarded, superficially cooperative, no psychomotor retardation  Speech: low volume, normal rate, fluent, poor articulation  Mood: "fine"  Affect: somewhat dysphoric, constricted, stable  Thought process: illogical, concrete  Thought content: paranoid delusions likely remain not spontaneous; denies SI/HI  Perceptual disturbances: denies AH/VH  Orientation: well oriented  Fund of knowledge: limited  Insight: poor  Judgement: impulse control intact but does make poor decisions about treatment

## 2022-09-13 PROCEDURE — 99232 SBSQ HOSP IP/OBS MODERATE 35: CPT

## 2022-09-13 RX ADMIN — CHLORHEXIDINE GLUCONATE 15 MILLILITER(S): 213 SOLUTION TOPICAL at 08:16

## 2022-09-13 RX ADMIN — Medication 1 MILLIGRAM(S): at 08:16

## 2022-09-13 RX ADMIN — CHLORHEXIDINE GLUCONATE 15 MILLILITER(S): 213 SOLUTION TOPICAL at 20:41

## 2022-09-13 RX ADMIN — HALOPERIDOL DECANOATE 10 MILLIGRAM(S): 100 INJECTION INTRAMUSCULAR at 08:16

## 2022-09-13 RX ADMIN — HALOPERIDOL DECANOATE 10 MILLIGRAM(S): 100 INJECTION INTRAMUSCULAR at 20:40

## 2022-09-13 RX ADMIN — Medication 1 MILLIGRAM(S): at 20:40

## 2022-09-13 NOTE — BH INPATIENT PSYCHIATRY PROGRESS NOTE - MSE UNSTRUCTURED FT
Appearance: poor dentition and oral hygiene; no tremors; some TD of oral muscles and hands; no cogwheeling; gait is stable   Behavior: attentive, superficially cooperative, no psychomotor retardation  Speech: low volume, normal rate, fluent, poor articulation due to poor dentition  Mood: "ok"  Affect: mildly dysphoric, constricted, stable  Thought process: illogical, concrete  Thought content: mild paranoid delusions remain but does not manifest spontaneously; denies SI/HI  Perceptual disturbances: denies AH/VH  Orientation: well oriented  Fund of knowledge: limited  Insight: poor  Judgement: impulse control intact

## 2022-09-13 NOTE — BH INPATIENT PSYCHIATRY PROGRESS NOTE - NSBHCHARTREVIEWVS_PSY_A_CORE FT
Vital Signs Last 24 Hrs  T(C): 36.8 (09-13-22 @ 15:40), Max: 36.8 (09-13-22 @ 15:40)  T(F): 98.3 (09-13-22 @ 15:40), Max: 98.3 (09-13-22 @ 15:40)  HR: --  BP: --  BP(mean): --  RR: 14 (09-13-22 @ 05:40) (14 - 14)  SpO2: 100% (09-13-22 @ 05:40) (100% - 100%)    Orthostatic VS  09-13-22 @ 05:40  Lying BP: 120/70 HR: 57  Sitting BP: 110/67 HR: 68  Standing BP: --/-- HR: --  Site: --  Mode: --

## 2022-09-13 NOTE — BH INPATIENT PSYCHIATRY PROGRESS NOTE - NSBHFUPINTERVALHXFT_PSY_A_CORE
Chart reviewed and case discussed with treatment team. Staff report patient has been calm and cooperative. Eating and sleeping well. Med compliant. Patient continues to have poor insight about circumstances leading to admission but does commit to tolerate her landlord on return home. Writer shared observation from staff that she has been appearing dysphoric. Patient does confirm that prolonged admission has been challenging. Denies SI or hopelessness.

## 2022-09-13 NOTE — BH SAFETY PLAN - STEP 1 WARNING SIGNS
.
Length Of Therapy: 3+ years
Add High Risk Medication Management Associated Diagnosis?: Yes
Detail Level: Zone

## 2022-09-13 NOTE — BH INPATIENT PSYCHIATRY PROGRESS NOTE - NSBHASSESSSUMMFT_PSY_ALL_CORE
72 year old  woman, domiciled with daughter, hx of chronic paranoid schizophrenia with multiple prior psychiatric hospitalizations and long standing hx of medication nonadherence, no hx of suicide attempts or suicidality, BIB EMS activated by patient’s daughter due to confrontational behavior towards landlord due to paranoia towards him. She has had incidents like this in the past, also including paranoia towards her neighbors.  Daughter reports patient's behavior has been very concerning due to inability to care for self (severe weight loss) and confrontational behavior that gets her into trouble with people around her. TOO obtained - has been med compliant. Patient has had partial response to medications - has chronic paranoia about landlord, but has been more cooperative with assessments and aftercare planning. Has had no agitation. AOT/ACT was being pursued but we have been facing challenges due to problems with insurance (medicaid inactive and will need min 30 days to reactivate). Patient has been calm here - will not be able to extend retention to wait for AOT/ACT.       Plan:    1) Disposition:   - continue inpatient care - will discharge to home shortly once aftercare planning in place  2) Legal status: 9.27   3) Psychotropic medications:  - continue haldol 10mg po bid   - got haldol decanoate 50mg once 8/24/22, 100mg on 8/31/22  4) Non-pharmacologic interventions:  - individual, group, milieu therapy as appropriate  5) Medical comorbidities:  - no acute needs other than gait instability  6) Work up:  - none  7) Social issues: patient now allowing care coordination with family and to obtain records from other providers; Daughter Ama updated today; medicaid is inactive and will need min 30 days to reapply - will no longer be holding off discharge to arrange ACT team   8) Psychoeducation: Risks and benefits discussed with patient, psychoeducation provided

## 2022-09-14 PROCEDURE — 99231 SBSQ HOSP IP/OBS SF/LOW 25: CPT

## 2022-09-14 RX ADMIN — HALOPERIDOL DECANOATE 10 MILLIGRAM(S): 100 INJECTION INTRAMUSCULAR at 20:28

## 2022-09-14 RX ADMIN — Medication 1 MILLIGRAM(S): at 20:29

## 2022-09-14 RX ADMIN — Medication 1 MILLIGRAM(S): at 09:00

## 2022-09-14 RX ADMIN — CHLORHEXIDINE GLUCONATE 15 MILLILITER(S): 213 SOLUTION TOPICAL at 20:28

## 2022-09-14 RX ADMIN — HALOPERIDOL DECANOATE 10 MILLIGRAM(S): 100 INJECTION INTRAMUSCULAR at 09:00

## 2022-09-14 RX ADMIN — CHLORHEXIDINE GLUCONATE 15 MILLILITER(S): 213 SOLUTION TOPICAL at 09:00

## 2022-09-14 NOTE — BH INPATIENT PSYCHIATRY PROGRESS NOTE - NSCGIIMPROVESX_PSY_ALL_CORE
4 = No change - symptoms remain essentially unchanged
4 = No change - symptoms remain essentially unchanged
3 = Minimally improved - slightly better with little or no clinically meaningful reduction of symptoms.  Represents very little change in basic clinical status, level of care, or functional capacity.
3 = Minimally improved - slightly better with little or no clinically meaningful reduction of symptoms.  Represents very little change in basic clinical status, level of care, or functional capacity.
4 = No change - symptoms remain essentially unchanged
2 = Much improved - notably better with signficant reduction of symptoms; increase in the level of functioning but some symptoms remain
4 = No change - symptoms remain essentially unchanged
4 = No change - symptoms remain essentially unchanged
3 = Minimally improved - slightly better with little or no clinically meaningful reduction of symptoms.  Represents very little change in basic clinical status, level of care, or functional capacity.
4 = No change - symptoms remain essentially unchanged
3 = Minimally improved - slightly better with little or no clinically meaningful reduction of symptoms.  Represents very little change in basic clinical status, level of care, or functional capacity.
4 = No change - symptoms remain essentially unchanged
3 = Minimally improved - slightly better with little or no clinically meaningful reduction of symptoms.  Represents very little change in basic clinical status, level of care, or functional capacity.
3 = Minimally improved - slightly better with little or no clinically meaningful reduction of symptoms.  Represents very little change in basic clinical status, level of care, or functional capacity.
4 = No change - symptoms remain essentially unchanged
3 = Minimally improved - slightly better with little or no clinically meaningful reduction of symptoms.  Represents very little change in basic clinical status, level of care, or functional capacity.
3 = Minimally improved - slightly better with little or no clinically meaningful reduction of symptoms.  Represents very little change in basic clinical status, level of care, or functional capacity.

## 2022-09-14 NOTE — BH TREATMENT PLAN - NSTXPSYCHOGOAL_PSY_ALL_CORE
Will report using a mindfulness skill to be able to read 5 pages of a book daily despite hallucinations Will engage in a 15 minute conversation with no irrational content

## 2022-09-14 NOTE — BH INPATIENT PSYCHIATRY PROGRESS NOTE - NSBHCHARTREVIEWVS_PSY_A_CORE FT
Vital Signs Last 24 Hrs  T(C): 36.6 (09-14-22 @ 15:43), Max: 36.6 (09-14-22 @ 15:43)  T(F): 97.8 (09-14-22 @ 15:43), Max: 97.8 (09-14-22 @ 15:43)  HR: --  BP: --  BP(mean): --  RR: 17 (09-14-22 @ 08:20) (17 - 17)  SpO2: 100% (09-14-22 @ 08:20) (100% - 100%)    Orthostatic VS  09-14-22 @ 08:20  Lying BP: 116/70 HR: 67  Sitting BP: 128/73 HR: 78  Standing BP: --/-- HR: --  Site: --  Mode: --  Orthostatic VS  09-13-22 @ 05:40  Lying BP: 120/70 HR: 57  Sitting BP: 110/67 HR: 68  Standing BP: --/-- HR: --  Site: --  Mode: --

## 2022-09-14 NOTE — BH TREATMENT PLAN - NSTXDISORGINTERPR_PSY_ALL_CORE
Pt made minimal progress towards goal attainment. She was able to identify an additional coping skill related to her home environment and landlord. Pt voiced plans to stay away from her landlord and minimize contact. Psych rehab staff will continue to encourage daily group/activity engagement as well as facilitate goal attainment over the next 7 days for improved symptom management.
Psych rehab staff will encourage daily group/activity engagement as well as facilitate goal attainment over the next 7 days for improved symptom management.
Pt declined 1:1 session today. She remains focused on discharge. Pt did not make any noted progress towards goal attainment. Psych rehab staff will continue to encourage daily group/activity engagement as well as facilitate goal attainment over the next 7 days for improved symptom management.
Pt was able to meet her psych rehab goal during today's session. Over the next week, pt will work on completing a safety plan including at least 2 warning signs for improved symptom management and sustained recovery.
Pt has made minimal progress towards goal attainment. Pt declined 1:1 session today. However, has been more engaged in group session. Pt is now willing to introduced herself to peers and respond when prompted within group activities. Psych rehab staff will continue to encourage daily group/activity engagement as well as facilitate goal attainment over the next 7 days for improved symptom management.
Psychiatric rehabilitation staff will provide encouragement, support, psychotherapy, and psychoeducation to assist the patient in the progression of her treatment goal.

## 2022-09-14 NOTE — BH TREATMENT PLAN - NSTXDCOTHRINTERSW_PSY_ALL_CORE
SW will provide psychoeducation, support and discharge planning. Collateral/family supports to be contacted accordingly.
while patient is comply with treatment and improving, she  continues to have delusions about her landlord and express other paranoia.  Cecilio and MD are addressing ACT/AOT. patient does not agree with this plan Daughter is pleased with this plan. ACT application was submitted by C[facility] but demeed incomplete as they require AOT to be submitted. while AOT will not accept until ACT is submitted.  AOT treatment plan completed today . tomorrow cecilio will complete AOT referral form and submit to dc planning office.
SW submitted ACT & AOT
pt denies need  for treatment or aot/act. (* sw department previously submitted  and is following up) pt interfaced with family who will accept home with services. pt is focused on discharge.
cecilio met with pt this week. she continues to deny need for medication but has been more compliant.  [facility]O is planned  but pt  might want to decline as she continues to state she has not plans for medication at dc, does not understand/accept plan for ACT/AOT when stabilized.  Pt continues to have paranoid statements about her landlord and does not acknowledge her prior behaviors with him leading to this admission.  Pt would like to return to housing with her daughter but remains upset with daughter due to daughter not agreeing with pt about landlord.
pt signed releases of info to [place] and [facility] - after [facility] approved. [facility] provided two ED visits followed by inpt in 2021 . the ED wanted to dc home but needed family to . They noted 'off meds' and 'psychotic'.  [facility]ima did not respond then refused to take home , requesting admission, psy  medication and aftercare. [place]  admitted.  FADI ESPINOZA suggested that this might not qualify for AOT admission. Sarita Hernandez has communicated with [facility].    Pt does not have ACT (although applied for) and does not believe needs medication - thus AOT.  Pending [facility] approval of  treatment plan for AOT petition and court date.  In addition, pt signed release of info to verbally communicate with her daughter.  pt is focused on discharge.
pt is complying with treatment and mental status is slowly improving. on 8/12 cecilio  advised pt again about act and aot.  Again pt states no need.  upon her inquiry sw  advised timeline a few weeks.  pt responded saying her  landlord says no visitors and will lose the residence if act staff come to her home.  pt wanted  dc immediately . or for a few days to pay some bills-  confused about explaining these bills. pt said  for years she  goes monthly to [place] to pay. pt unable to explain if about property or services. pt says she will not involve her daughter to assist.  sw offered to [employer] for phone if she could recall name of where she went.   Pt upset . About act and  Aot for the first time now addresses  bills pt  said she wanted  and  sw encouraged pt to call [facility] if she desired. She did call [facility] . [facility] emailed team and was advised of above.  No additional comments from pt  to sw re dc planning. since 8 12.
SW will provide patient and her spouse/family with support, psycho-education, and discharge planning; while coordinating care with interdisciplinary treatment team.
sw mets with patient who remains psychotic.  MD has reportedly documented pt does not have capacity and sw should call daughter.
SW will provide psychoeducation, support and discharge planning. Collateral/family supports to be contacted accordingly.
sw met with patient and advised of plan for AOT and ACT. pt does not agree. sw met with team to address some points on the application not available to sw . team is participating.
pt denies need for act/aot.  sw previously made applications which were submitted.

## 2022-09-14 NOTE — BH TREATMENT PLAN - NSTXOTHERINTERMD_PSY_ALL_CORE
Med titration

## 2022-09-14 NOTE — BH TREATMENT PLAN - NSTXPATIENTPARTICIPATE_PSY_ALL_CORE
No, patient unwilling to participate
Patient participated in identification of needs/problems/goals for treatment

## 2022-09-14 NOTE — BH TREATMENT PLAN - NSTXCOPEGOAL_PSY_ALL_CORE
Identify and utilize 2 coping skills that meet their needs
2
Identify and utilize 2 coping skills that meet their needs

## 2022-09-14 NOTE — BH TREATMENT PLAN - NSTXPATIENTAGREEMENT_PSY_ALL_CORE
Yes
Patient unable to participate
No
Patient unable to participate
No
Patient unable to participate
Patient unable to participate
No
No
Patient unable to participate
Yes
Yes

## 2022-09-14 NOTE — BH TREATMENT PLAN - NSTXDCOTHRDATEEST_PSY_ALL_CORE
05-Jul-2022
20-Jun-2022
05-Jul-2022
23-Aug-2022
13-Sep-2022
23-Aug-2022
20-Jun-2022

## 2022-09-14 NOTE — BH TREATMENT PLAN - NSTXPLANSTARTDATE_PSY_ALL_CORE
13-Jul-2022
22-Jun-2022 15:32
21-Jul-2022
18-Aug-2022 00:00
29-Jun-2022 15:29
06-Jul-2022
18-Aug-2022
27-Jul-2022
14-Sep-2022 19:14
24-Aug-2022
10-Aug-2022
31-Aug-2022

## 2022-09-14 NOTE — BH TREATMENT PLAN - NSTXCAREGIVERAGREEMENT_PSY_P_CORE
Patient does not have family/caregiver involved in care
Yes
Patient does not have family/caregiver involved in care
Yes
Patient does not have family/caregiver involved in care
Patient does not have family/caregiver involved in care
Yes
Patient does not have family/caregiver involved in care
Yes
Patient does not have family/caregiver involved in care
Yes
Yes

## 2022-09-14 NOTE — BH TREATMENT PLAN - NSBHPRIMARYDX_PSY_ALL_CORE
Schizophrenia    
Paranoid schizophrenia    
Schizophrenia    

## 2022-09-14 NOTE — BH TREATMENT PLAN - NSTXCOPEINTERRN_PSY_ALL_CORE
Assess mood and behavior. Provide supportive contact.
Encouraged medication compliance.
Encouraged pt to use positive coping skills such as reading a book.
Assess mood and behavior. PRN medications as ordered. Encouraged pt to verbalize feelings and concerns.
Assist in implementing healthy coping mechanism.
Initiate frequent interactions with patient to encourage verbalization of feelings and concerns. Reinforce treatment plan and medication teaching.Encourage participation in unit activities.
Assist pt. with identifying two coping skills that she is able to use consistently. Reinforce treatment plan. encourage participation in unit activity groups. Encourage socialization.
Assess mood and behavior. PRN medications as ordered. Encouraged pt to verbalize feelings and concerns.
Assess pt's mood/ behavior. Provide supportive contacts.

## 2022-09-14 NOTE — BH TREATMENT PLAN - NSTXPSYCHOINTERRN_PSY_ALL_CORE
Encouraged complaince with prescribed meds.
Encouraged complaince with prescribed meds.
Assess pt's response to meds over objection that were initiated. Give positive feedback for med compliance. Reality test as tolerated.
Encouraged complaince with prescribed meds.
reality testing and redirection as needed. Reinforce treatment plan and medication teaching. Encourage participation in unit activity groups.
reality testing prn. reinforce treatment plan.

## 2022-09-14 NOTE — BH TREATMENT PLAN - NSTXDISORGGOAL_PSY_ALL_CORE
Will demonstrate related thoughts for 5 min in conversation
Will make at least 3 goal and reality oriented statements during therapy
Other...
Will identify 2 coping skills that assist in organizing
Will demonstrate the ability to maintain reality orientation and communicate clearly with others during 2 conversations with staff daily
Other...

## 2022-09-14 NOTE — BH TREATMENT PLAN - NSTXDCOTHRINTERMD_PSY_ALL_CORE
Defer to SW. Will provide necessary documentation for disposition planning.

## 2022-09-14 NOTE — BH TREATMENT PLAN - NSTXPSYCHOPROGRES_PSY_ALL_CORE
No Change
Improving
No Change
Improving

## 2022-09-14 NOTE — BH TREATMENT PLAN - NSTXOTHERPROGRES_PSY_ALL_CORE
Met - goal discontinued
No Change
Met - goal discontinued
No Change
No Change

## 2022-09-14 NOTE — BH TREATMENT PLAN - NSTXPLANTHERAPYSESSIONSFT_PSY_ALL_CORE
07-01-22  Type of therapy: Cognitive behavior therapy,Leisure development,Spirituality  Type of session: Individual  Level of patient participation: Engaged,Participated with encouragement  Duration of participation: Less than 15 minutes  Therapy conducted by: Psych rehab  Therapy Summary: Patient was receptive to writers engagement.  Patient was observed to have severe psychomotor agitation as observed by exaggerated involuntary movements throughout duration of engagement.     While patient was receptive to writers engagement, patient was only able to engage in simple and superficial dialogue/exchanges with writer.  Patient articulated that she "feels good".  Patient maintained a pleasant affect and was able to visually track writer.  Patient intermittently smiled and giggled inappropriately.  Patient was not able to engage in in-depth dialogue with writer nor elaborate upon symptoms.      Per treatment team collateral, patient is compliant with medications within the context of medication over objection ruling.  Patient has made slight noticable improvements specific to presentation.  Patient appears to be more coherant and demonstrates a slight improvement in tolerance for milieu activities.      Patient remains in hospital gowns and appearance is unkempt.  Patient reported no concerns specific to treatment at this time and denied SI/HI and psychotic symptomology (although residual/underlying psychotic symptomology is suspected).    07-01-22  Type of therapy: Other,Physical therapy  Type of session: Individual  Level of patient participation: Participates  --  Therapy conducted by: Other (specify),Physical therapy  Therapy Summary: Patient participated in therapeutic activity.  
  07-15-22  Type of therapy: Coping skills,Health and fitness,Leisure development,Peer advocate,Spirituality  Type of session: Individual  Level of patient participation: Resistance to participation  Duration of participation: Less than 15 minutes  Therapy conducted by: Psych rehab  Therapy Summary: Pt talked briefly with writer, however, declined full session due to fatigue and paranoia. She spoke about others treating and looking at her differently after talking with her. Pt voiced disappointment over court hearing.   Pt continues to deny all illness symptoms as well as having a mental illness.  Pt is complying with her medication. She has demonstrated some progress in regards to illness symptoms and behavior. Pt remains in good behavioral control. Her ADLs are fair. Pt was more guarded, paranoid, and withdrawn today.  Pt attends a fair portion of psych rehab groups. Pt requires significant assistance to engage in group activities; she prefers to listen and often declines groups offered outside the main dayroom. Pt does not initiate social interaction with peers. Pt is isolative to self.  She is visible in the milieu.  Pt continues to present with significant involuntary movements. She is difficult to comprehend in part due to poor dentation, accent, as well as involuntary movements of her mouth. Pt's insight and judgement are improving but remain limited.  
  07-21-22  Type of therapy: Other,Physical therapy  Type of session: Individual  Level of patient participation: Participates  --  Therapy conducted by: Other (specify),Physical therapy  Therapy Summary: Patient participated in therapeutic activity and B LE exercises.    07-22-22  Type of therapy: Coping skills,Leisure development,Peer advocate,Spirituality  Type of session: Individual  Level of patient participation: Participated with encouragement  Duration of participation: 15 minutes  Therapy conducted by: Psych rehab  Therapy Summary: Pt was seen 1:1. She was cooperative and engaged in session. However, pt was noted to be paranoid and guarded. She declined to answer several questions including how many children she had expressing that she was a private person and did not want to share information which she felt could be used against her. Pt attends a small portion of psych rehab groups. Pt requires significant assistance to engage in group activities prefers to listen. Pt continues to decline groups offered in the dinning room, reporting the need to be vigilant of her surrounding incase her doctor wants to ask her questions.  Pt continues to deny all illness symptoms as well as having a mental illness.  Pt is complying with her medication. She has maintained progress related to illness symptoms and behavior. Pt remains in good behavioral control. Her ADLs are fair. Pt is generally calm and cooperative. However, pt remains guarded, paranoid, and self-isolative.  Pt does not initiate social interaction with peers. She is visible in the milieu.  Pt continues to present with significant involuntary movements. She is difficult to comprehend in part due to poor dentation, and accent. Pt's insight and judgement are improving but remain limited.    07-26-22  Type of therapy: Other,Physical therapy  Type of session: Individual  Level of patient participation: Participates  --  Therapy conducted by: Other (specify),Physical therapy  Therapy Summary: Patient participated in B LE exercises and gait training.  
  08-19-22  Type of therapy: Cognitive behavior therapy,Coping skills,Creative arts therapy,Leisure development,Music therapy,Peer advocate,Pet therapy,Psychoeducation  Type of session: Individual  Level of patient participation: Resistance to participation  Duration of participation: Less than 15 minutes  Therapy conducted by: Psych rehab  Therapy Summary: Writer attempted to meet with Pt to assess PT’s progress towards Psych Rehab goal. Patient was sleeping in the chair in the dayroom on approach - when writer noted this, she denied it and said she was resting. Pt refused to talk with the writer. Due to Pt’s refusal this note is largely based on Pt’s chart Pt continues to keep to herself but is otherwise cooperative with care. Patient is concrete, seems to overcompensate by stating everything is fine, even with side effects. She reports her mood is "very good" despite the obvious fact that she is frustrated with being here. Per the Psychiatrist note Pt finally agreed to sign consent to obtain medical records from other hospitals in order to apply for AOT/ACT. Patient is disorganized and seems to think this will result in people coming into her house. Though paranoia about landlord persists, she is less preoccupied. She does state she plans on moving with her daughter. Over the past week, Pt made not progress and still refuse to engage in safety planning. Pt Attends all groups in dayroom (refuses groups in dinning room). Increase engagement in superficial groups such as trivia. Pt needs a lot of encouragement to engage in 1:1.    08-21-22  Type of therapy: Coping skills  --  --  --  --  --    08-23-22  Type of therapy: Other,Physical therapy  Type of session: Individual  Level of patient participation: Participates  --  Therapy conducted by: Other (specify),Physical therapy  Therapy Summary: Patient engaged in therapeutic activity.  
  09-09-22  Type of therapy: Coping skills,Health and fitness,Leisure development,Peer advocate  Type of session: Individual  Level of patient participation: Resistance to participation  Duration of participation: Less than 15 minutes  Therapy conducted by: Psych rehab  Therapy Summary: Pt declined 1:1 session today. She remains focused on discharge. Pt is noted to be more dysphoric and despondent. She is focused on discharge. As pt declined to meet below information is based on observation and medical records, pt continues to deny all illness symptoms. She is guarded and withdrawn. Pt attended a fair portion of psych rehab groups this week.  Pt minimally engaged in groups given significant encouragement. Pt remains in good behavioral control. Her ADLs are fair. Pt is generally calm and cooperative. Pt does not initiate social interaction with peers. She is visible in the milieu.  Pt involuntary movements and tremors have decreased significantly. Pt is difficult to comprehend in part due to poor dentation, and accent. Pt's insight and judgement remain limited. Psych Rehab staff will continue to encourage daily group/activity engagement as well as facilitate goal attainment over the next 7 days for improved symptom management.    09-14-22  Type of therapy: Other,Physical therapy  Type of session: Individual  Level of patient participation: Participates  --  Therapy conducted by: Other (specify),Physical therapy  Therapy Summary: Patient participated in therapeutic activity and gait training  
  07-08-22  Type of therapy: Coping skills,Leisure development,Music therapy,Peer advocate,Symptom management  Type of session: Individual  Level of patient participation: Engaged  Duration of participation: 15 minutes  Therapy conducted by: Psych rehab  Therapy Summary: Writer met with pt in order to discuss progress towards Psychiatric Rehabilitation goals over the past week. Pt continues to deny all illness symptoms as well as having a mental illness. Overall, pt was pleasant and engaged in session. Pt is now complying with her medication. She has demonstrated some progress. Pt is less guarded and labile. Pt has attended a fair portion of psych rehab groups. Pt requires significant assistance to engage in group activities; she reports preferring to listen. Pt does not initiate social interaction with peers. Pt is often isolative to self.  She is visible in the milieu.  Pt continues to present with significant involuntary movements. She is difficult to comprehend in part due to poor dentation, accent, as well as involuntary movements of her mouth. Pt's insight and judgement are improving but remain limited.    07-11-22  Type of therapy: Other,Physical therapy  Type of session: Individual  Level of patient participation: Participates  --  Therapy conducted by: Other (specify),Physical therapy  Therapy Summary: Patient participated in B LE exercises    07-13-22  Type of therapy: Other,Physical therapy  Type of session: Individual  Level of patient participation: Attentive,Engaged,Participated with encouragement  --  Therapy conducted by: Other (specify),Physical therapy  Therapy Summary: Patient particapted in gait training and therapeutic activity.  
  06-24-22  Type of therapy: Coping skills,Leisure development,Peer advocate  Type of session: Individual  Level of patient participation: Not engaged  Duration of participation: Less than 15 minutes  Therapy conducted by: Psych rehab  Therapy Summary: Pt declined 1:1 session today as she was waiting for doctor. Therefore, below note is based on observations, prior interactions, medical records, and team report.  Pt has demonstrated some progress over the course of the week. She has begun attending groups and is less guarded and resistant to engaging. Pt is refusing medication.  She continues to be focused on returning home and denies all illness symptoms. Pt is noted to be disorganized, illogical, and paranoid towards landlord. Pt continues to present with delusional beliefs. Pt's mood is labile, she can easily become irritable such as when discussing medication; at times pt is boisterous and loud.  Pt continues to present with significant involuntary movements. She is difficult to comprehend in part due to poor dentation, accent, as well as involuntary movements of her mouth. Pt's insight and judgement are poor.  
  07-29-22  Type of therapy: Coping skills,Health and fitness,Leisure development,Music therapy,Peer advocate,Spirituality  Type of session: Individual  Level of patient participation: Participated with encouragement  Duration of participation: Less than 15 minutes  Therapy conducted by: Psych rehab  Therapy Summary: Pt was seen 1:1. She required increased encouragement to transition to private area (dining room) with writer.  Pt was cooperative and engaged in session. However, remained paranoid and guarded. She expressed reluctance at sharing if she speaks to her daughter and continues to report not wanting to share information which can be used against her. Pt attends a moderate portion of psych rehab groups this week. Pt attends the majority of groups offered in the dayroom; she declines all groups offered in the dining room.  Pt requires significant assistance to engage in group activities prefers to listen.  Pt continues to deny all illness symptoms as well as having a mental illness.  Pt is complying with her medication. She has maintained progress related to illness symptoms and behavior. Pt remains in good behavioral control. Her ADLs are fair. Pt is generally calm and cooperative. However, pt remains guarded, paranoid, and self-isolative.  Pt does not initiate social interaction with peers. She is visible in the milieu.  Pt continues to present with significant involuntary movements. She is difficult to comprehend in part due to poor dentation, and accent. Pt's insight and judgement remain limited.  
  08-05-22  Type of therapy: Cognitive behavior therapy,Coping skills,Creative arts therapy,Inspiration and motiviation,Leisure development,Mindfulness,Peer advocate,Pet therapy,Spirituality  Type of session: Individual  Level of patient participation: Engaged  Duration of participation: 15 minutes  Therapy conducted by: Psych rehab  Therapy Summary: Writer met with Pt to discuss her progress towards Psych Rehab goal. Pt was receptive. Pt was seen in the day area of the unit. Pt was verbal, pleasant, cooperative and engaged during the session. Pt reports that she is doing well and there is nothing to report to the writer. Pt is compliant with meds and treatment. Pt denies SI/HI/AH/VH. Per the staff Pt has been less defensive and preoccupied with paranoid thoughts. Over the past week Pt did not make any progress towards her Psych Rehab goal of identifying at least 2 warning signs for improved symptom management and sustained recovery. Pt reports that she does not have any triggers. Pt also declined in engaging to work on safety plan despite encouragement from the writer. Pt attended a moderate portion of psych rehab groups. Per the Psych Rehab staff of the unit Pt attends most groups offered in the dayroom; she declines all groups offered in the dining room.  Pt participates during the groups but with encouragement. Pt continues to deny all illness symptoms as well as having a mental illness.  Pt remains in good behavioral control. Her ADLs are fair. Pt is generally calm and cooperative. Pt does not initiate social interaction with peers. She is visible in the milieu.  Pt continues to present with significant involuntary movements. She is difficult to comprehend in part due to poor dentation, and accent. Pt's insight and judgement remain limited. Psych Rehab staff will continue to encourage daily group/activity engagement as well as facilitate goal attainment over the next 7 days for improved symptom management.    08-08-22  Type of therapy: Other,Physical therapy  Type of session: Individual  Level of patient participation: Participates  --  Therapy conducted by: Other (specify),Physical therapy  Therapy Summary: Patient engaged in therapeutic activity and ambulation.  
  08-26-22  Type of therapy: Coping skills,Creative arts therapy,Health and fitness,Leisure development,Spirituality  Type of session: Individual  Level of patient participation: Engaged  Duration of participation: 30 minutes  Therapy conducted by: Psych rehab  Therapy Summary: Writer met with pt to discuss her progress towards Psych Rehab goal. Pt was receptive. Pt was verbal, pleasant, cooperative and engaged during the session. Pt continues to deny all illness symptoms. Pt is often guarded and withdrawn. During today's session, pt perseverated on whether she should let the treatment team talk with her daughter as she is concerned that she or her daughter might say information that could be used against her in court.  Pt appeared somewhat receptive to the idea of an ACT team.   Pt attends a moderate portion of psych rehab groups.  Pt attends most groups offered in the dayroom; she declines the majority of the groups offered in the dining room.  Pt participates during the groups but with encouragement. Pt continues to deny all illness symptoms as well as having a mental illness.  Pt remains in good behavioral control. Her ADLs are fair. Pt is generally calm and cooperative. Pt does not initiate social interaction with peers. She is visible in the milieu.  Pt continues to present with significant involuntary movements. She is difficult to comprehend in part due to poor dentation, and accent. Pt's insight and judgement remain limited. Psych Rehab staff will continue to encourage daily group/activity engagement as well as facilitate goal attainment over the next 7 days for improved symptom management.    08-31-22  Type of therapy: Other,Physical therapy  Type of session: Individual  Level of patient participation: Participates  --  Therapy conducted by: Other (specify),Physical therapy  Therapy Summary: Patient participated in gait training  
  08-12-22  Type of therapy: Cognitive behavior therapy,Coping skills,Creative arts therapy,Leisure development,Mindfulness,Music therapy,Peer advocate,Pet therapy,Relapse prevention,Spirituality  Type of session: Individual  Level of patient participation: Engaged  Duration of participation: 15 minutes  Therapy conducted by: Psych rehab  Therapy Summary: Writer met with Pt to discuss her progress towards Psych Rehab goal. Pt was receptive. Pt was seen in the day area of the unit. Pt was verbal, pleasant, cooperative and engaged during the session. Pt essentially the same as last week and there were not significant changes observed. Pt reports that she is doing well and there is nothing to report to the writer. Pt is compliant with meds and treatment. Pt denies SI/HI/AH/VH. Over the past week Pt did not make any progress towards her Psych Rehab goal of identifying at least 2 warning signs for improved symptom management and sustained recovery. Pt reports that she does not have any triggers. Pt also declined in engaging to work on safety plan despite encouragement from the writer. Pt attended a most of the Psych rehab groups. Pt participates with encouragement during the groups. Pt continues to deny all illness symptoms as well as having a mental illness.  Pt remains in good behavioral control. Her ADLs are fair. Pt is generally calm and cooperative. Pt does not initiate social interaction with peers. She is visible in the milieu.  Pt continues to present with significant involuntary movements. She is difficult to comprehend in part due to poor dentation, and accent. Pt's insight and judgement remain limited. Psych Rehab staff will continue to encourage daily group/activity engagement as well as facilitate goal attainment over the next 7 days for improved symptom management.    08-17-22  Type of therapy: Other,Physical therapy  Type of session: Individual  Level of patient participation: Participates  --  Therapy conducted by: Other (specify),Physical therapy  Therapy Summary: Patient participated in gait training within unit without any assistive device.

## 2022-09-14 NOTE — BH TREATMENT PLAN - NSTXPSYCHOINTERMD_PSY_ALL_CORE
Medication over objection and subsequent med titration. 

## 2022-09-14 NOTE — BH TREATMENT PLAN - NSTXCOPEINTERMD_PSY_ALL_CORE
Treatment over objection
med titration

## 2022-09-14 NOTE — BH TREATMENT PLAN - NSTXPSYCHODATEEST_PSY_ALL_CORE
18-Jun-2022
29-Jun-2022
18-Jun-2022
18-Jun-2022
29-Jun-2022
18-Jun-2022
22-Jun-2022
18-Jun-2022
13-Jul-2022

## 2022-09-14 NOTE — BH TREATMENT PLAN - NSTXCOPEDATEEST_PSY_ALL_CORE
27-Jul-2022
18-Jun-2022
29-Jun-2022

## 2022-09-14 NOTE — BH INPATIENT PSYCHIATRY PROGRESS NOTE - MSE UNSTRUCTURED FT
Appearance: poor dentition and oral hygiene; no tremors; some TD of oral muscles and hands; no cogwheeling; gait is stable   Behavior: calm, oddly related, no psychomotor retardation or agitation  Speech: soft spoken, normal rate, fluent, poor articulation due to poor dentition  Mood: "fine"  Affect: mildly dysphoric, constricted, stable  Thought process: illogical, concrete  Thought content: mild paranoid delusions remain but does not manifest spontaneously; denies SI/HI  Perceptual disturbances: denies AH/VH  Orientation: well oriented  Fund of knowledge: limited  Insight: poor  Judgement: impulse control intact

## 2022-09-14 NOTE — BH TREATMENT PLAN - NSTXDISORGINTERRN_PSY_ALL_CORE
maintain consistency and reinforce unit routine. Reinforce treatment plan. Encourage participation in activity groups.
Redirect pt as needed.
maintain consistency and reinforce unit routine. Encourage pt. to express her feelings and concerns to staff. Encourage participation in unit activities.

## 2022-09-14 NOTE — BH TREATMENT PLAN - NSTXDCOTHRGOAL_PSY_ALL_CORE
pt will comply with treatment and improve mental status in order to effectively engage with dc planning.
pt will participate with treatment and improve mental status in order to effectively engage for discharge planning.
pt will comply with treatment and improve mental status in order to effectively engage with dc planning.
pt will comply with treatment to improve mental status and be able to effectively engage with dc planning.
patient will comply with treatment and improve mental status in order to effectively and allow family to interface with team.
pt will comply with treatment in order to improve mental status and be able to effectively engage with dc planning
Patient will report improved mood and insight into coping skills for symptoms, and with no SIIP.
Pt. will comply with treatment recommendations within seven days.
The patient will participate in treatment and improve mental statusin order to effectively engage in dischrge planning
pt will comply with treatment and improve mental status in order to effectively engage with dc planning.
pt will comply with treatment and improve mental status in order to effectively engage with discharge planning.  AOT and ACT are planned by team.
Pt. will comply with treatment recommendations within seven days.

## 2022-09-14 NOTE — BH TREATMENT PLAN - NSPTSTATEDGOAL_PSY_ALL_CORE
Pt. too disorganized unable to state goal
to be discharged
Pt. too disorganized unable to state goal
requests discharge
to be discharged
To be discharged
discharge
discharge
To be discharged
discharge
To be discharged
discharge

## 2022-09-14 NOTE — BH TREATMENT PLAN - NSTXCOPEDATETRGT_PSY_ALL_CORE
10-Aug-2022
24-Aug-2022
24-Aug-2022
27-Jul-2022
07-Sep-2022
27-Jul-2022
21-Sep-2022
03-Aug-2022
06-Jul-2022

## 2022-09-14 NOTE — BH TREATMENT PLAN - NSTXPROBDCOTHR_PSY_ALL_CORE
DISCHARGE ISSUE - OTHER

## 2022-09-14 NOTE — BH TREATMENT PLAN - NSTXOTHERGOAL_PSY_ALL_CORE
Pt will comply with treatment
Pt will comply with treatment
patient will comply with treatment and improve mental status in order to effectively and allow family to interface with team.
Pt will comply with treatment
patient will comply with treatment and improve mental status in order to effectively and allow family to interface with team.
patient will comply with treatment and improve mental status in order to effectively and allow family to interface with team.

## 2022-09-14 NOTE — BH INPATIENT PSYCHIATRY PROGRESS NOTE - NSBHFUPINTERVALHXFT_PSY_A_CORE
Chart reviewed and case discussed with treatment team. Staff report patient continues to be calm and cooperative. She has been appearing dysphoric per staff, likely due to prolonged hospitalization. Patient continues to have poor insight about incident involving her landlord but she vows not to get involved with him anymore. Writer suggested letting her daughter deal with him in the future to which she stated would be a good idea. Patient reports intact sleep and appetite. Denies SI.

## 2022-09-14 NOTE — BH TREATMENT PLAN - NSTXPSYCHODATENEW_PSY_ALL_CORE
06-Jul-2022

## 2022-09-15 PROCEDURE — 99231 SBSQ HOSP IP/OBS SF/LOW 25: CPT

## 2022-09-15 RX ADMIN — CHLORHEXIDINE GLUCONATE 15 MILLILITER(S): 213 SOLUTION TOPICAL at 09:25

## 2022-09-15 RX ADMIN — Medication 1 MILLIGRAM(S): at 20:39

## 2022-09-15 RX ADMIN — CHLORHEXIDINE GLUCONATE 15 MILLILITER(S): 213 SOLUTION TOPICAL at 20:38

## 2022-09-15 RX ADMIN — HALOPERIDOL DECANOATE 10 MILLIGRAM(S): 100 INJECTION INTRAMUSCULAR at 20:38

## 2022-09-15 RX ADMIN — HALOPERIDOL DECANOATE 10 MILLIGRAM(S): 100 INJECTION INTRAMUSCULAR at 09:25

## 2022-09-15 RX ADMIN — Medication 1 MILLIGRAM(S): at 09:25
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
## 2022-09-15 NOTE — BH INPATIENT PSYCHIATRY PROGRESS NOTE - NSBHFUPINTERVALHXFT_PSY_A_CORE
Chart reviewed and case discussed with treatment team. Staff report    Chart reviewed and case discussed with treatment team. Staff report patient has been calm and cooperative. She is withdrawn, does not participate in groups. She denies all complaints though she does have a tendency to minimize everything. Writer noted she has an abnormal gait and some odd posturing but patient disagrees. Patient has been med compliant. She has residual paranoid thoughts about her landlord but commits to deferring interactions with him to her daughter.

## 2022-09-15 NOTE — BH INPATIENT PSYCHIATRY PROGRESS NOTE - MSE UNSTRUCTURED FT
Appearance: poor dentition and oral hygiene; no tremors; some TD of oral muscles and hands; no cogwheeling; gait is stable   Behavior: calm, oddly related, no psychomotor retardation or agitation  Speech: soft spoken, normal rate, fluent, poor articulation due to poor dentition  Mood: "fine"  Affect: mildly dysphoric, constricted, stable  Thought process: illogical, concrete  Thought content: mild paranoid delusions remain but does not manifest spontaneously; denies SI/HI  Perceptual disturbances: denies AH/VH  Orientation: well oriented  Fund of knowledge: limited  Insight: poor  Judgement: impulse control intact      Appearance: thin but adequately nourished; some TD of oral muscles and hands; no cogwheeling or tremors; gait is abnormal but stable   Behavior: calm, guarded, superficial, no psychomotor retardation or agitation  Speech: soft spoken, normal rate, fluent, limited spontaneity  Mood: "good"  Affect: mildly dysphoric, constricted, stable  Thought process: vague, concrete, somewhat illogical  Thought content: mild paranoid delusions remain but does not manifest spontaneously; denies SI/HI  Perceptual disturbances: denies AH/VH  Orientation: well oriented  Fund of knowledge: limited  Insight: poor  Judgement: impulse control intact

## 2022-09-15 NOTE — BH INPATIENT PSYCHIATRY PROGRESS NOTE - NSBHCHARTREVIEWVS_PSY_A_CORE FT
Vital Signs Last 24 Hrs  T(C): 36.7 (09-15-22 @ 15:20), Max: 36.7 (09-15-22 @ 08:49)  T(F): 98 (09-15-22 @ 15:20), Max: 98 (09-15-22 @ 08:49)  HR: --  BP: --  BP(mean): --  RR: --  SpO2: --    Orthostatic VS  09-15-22 @ 08:49  Lying BP: --/-- HR: --  Sitting BP: 124/77 HR: 73  Standing BP: 115/73 HR: 79  Site: --  Mode: --  Orthostatic VS  09-14-22 @ 08:20  Lying BP: 116/70 HR: 67  Sitting BP: 128/73 HR: 78  Standing BP: --/-- HR: --  Site: --  Mode: --

## 2022-09-16 PROCEDURE — 99231 SBSQ HOSP IP/OBS SF/LOW 25: CPT

## 2022-09-16 RX ADMIN — HALOPERIDOL DECANOATE 10 MILLIGRAM(S): 100 INJECTION INTRAMUSCULAR at 10:06

## 2022-09-16 RX ADMIN — CHLORHEXIDINE GLUCONATE 15 MILLILITER(S): 213 SOLUTION TOPICAL at 10:06

## 2022-09-16 RX ADMIN — CHLORHEXIDINE GLUCONATE 15 MILLILITER(S): 213 SOLUTION TOPICAL at 20:22

## 2022-09-16 RX ADMIN — Medication 1 MILLIGRAM(S): at 10:06

## 2022-09-16 RX ADMIN — HALOPERIDOL DECANOATE 10 MILLIGRAM(S): 100 INJECTION INTRAMUSCULAR at 20:21

## 2022-09-16 RX ADMIN — Medication 1 MILLIGRAM(S): at 20:20

## 2022-09-16 NOTE — BH DISCHARGE NOTE NURSING/SOCIAL WORK/PSYCH REHAB - NSBHDCAGENCY1FT_PSY_A_CORE
[facility], Dr. Brito [facility], Dr. Frias

## 2022-09-16 NOTE — BH DISCHARGE NOTE NURSING/SOCIAL WORK/PSYCH REHAB - NSDCADDINFO2FT_PSY_ALL_CORE
Please call your insurance company at [phone] prior to your appointment to finish enrolling. You need to let your insurance company know this is your chosen primary care doctor. Your insurance ID is [employer] [id number].

## 2022-09-16 NOTE — BH DISCHARGE NOTE NURSING/SOCIAL WORK/PSYCH REHAB - NSDCPRGOAL_PSY_ALL_CORE
Writer met with patient to discuss patient’s discharge and progress towards rehabilitation goals. Patient was pleasant and cooperative to meet with writer. Patient was able to engage in safety planning with writer’s support and declined to complete survey prior to discharge. Patient was admitted to [facility] on 6/18/2022 due to agitation in the context of medication non-adherence. During current hospitalization, patient demonstrated fair improvements in symptoms. Patient reported feeling more relaxed. Patient remains somewhat guarded but less so. Patient has been adherent to medication and reported no side effects. Patient endorsed fair sleep and appetite. Patient denied S/HI/AVH. Patient expressed looking forward to discharge and being able to cook. Patient has met her psychiatric rehabilitation goal of completing a safety plan. With support from writer, patient was able to identify coping skills, support system, and reasons for living. Due to the COVID-19 pandemic, unit structure and activities are re-evaluated on a consistent basis in effort to maintain the safety of patients and staff. Patient attended some psychiatric rehabilitation groups and was somewhat able to participate with encouragement. Patient stated she enjoyed groups with music. Patient is visible on the unit and maintains good behavioral control. Patient is isolative with peers and able to make needs known to staff.

## 2022-09-16 NOTE — BH DISCHARGE NOTE NURSING/SOCIAL WORK/PSYCH REHAB - NSDCPRRECOMMEND_PSY_ALL_CORE
Psychiatric Rehabilitation staff recommends that patient begins treatment with [facility] for ongoing medication management, support, and psychotherapy. In addition, psych rehab recommends patient continue to demonstrate medication adherence in the community and utilize coping skills for improved symptom management and sustained recovery.
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
## 2022-09-16 NOTE — BH DISCHARGE NOTE NURSING/SOCIAL WORK/PSYCH REHAB - DISCHARGE INSTRUCTIONS AFTERCARE APPOINTMENTS
In order to check the location, date, or time of your aftercare appointment, please refer to your Discharge Instructions Document given to you upon leaving the hospital.  If you have lost the instructions please call [phone]

## 2022-09-16 NOTE — BH INPATIENT PSYCHIATRY PROGRESS NOTE - MSE UNSTRUCTURED FT
Appearance: thin but adequately nourished; some TD of oral muscles and hands; no cogwheeling or tremors; gait is abnormal but stable   Behavior: calm, guarded, superficial, no psychomotor retardation or agitation  Speech: soft spoken, normal rate, fluent, limited spontaneity  Mood: "good"  Affect: mildly dysphoric, constricted, stable  Thought process: vague, concrete, somewhat illogical  Thought content: mild paranoid delusions remain but does not manifest spontaneously; denies SI/HI  Perceptual disturbances: denies AH/VH  Orientation: well oriented  Fund of knowledge: limited  Insight: poor  Judgement: impulse control intact      Appearance: adequate grooming; mild TD of oral muscles and hands; no cogwheeling or tremors; gait is abnormal but stable; poor dentition  Behavior: calm, guarded, superficial related, no psychomotor retardation or agitation  Speech: soft spoken, normal rate, fluent  Mood: "fine"  Affect: mildly dysphoric, constricted, stable  Thought process: vague, concrete, inconsistent  Thought content: mild paranoid delusions remain but does not manifest spontaneously; denies SI/HI  Perceptual disturbances: denies AH/VH  Orientation: well oriented  Fund of knowledge: limited  Insight: poor  Judgement: impulse control intact

## 2022-09-16 NOTE — BH INPATIENT PSYCHIATRY PROGRESS NOTE - NSBHCHARTREVIEWVS_PSY_A_CORE FT
Vital Signs Last 24 Hrs  T(C): 36.9 (09-16-22 @ 15:34), Max: 36.9 (09-16-22 @ 15:34)  T(F): 98.5 (09-16-22 @ 15:34), Max: 98.5 (09-16-22 @ 15:34)  HR: --  BP: --  BP(mean): --  RR: --  SpO2: --    Orthostatic VS  09-16-22 @ 08:02  Lying BP: --/-- HR: --  Sitting BP: 122/74 HR: 85  Standing BP: 126/62 HR: 76  Site: --  Mode: --  Orthostatic VS  09-15-22 @ 08:49  Lying BP: --/-- HR: --  Sitting BP: 124/77 HR: 73  Standing BP: 115/73 HR: 79  Site: --  Mode: --

## 2022-09-16 NOTE — BH DISCHARGE NOTE NURSING/SOCIAL WORK/PSYCH REHAB - NSCDUDCCRISIS_PSY_A_CORE
[facility]  1 [phone] [facility]-WELL ([phone])  Text "WELL" to [phone]  Website: [email or web address]/.[employer] [facility] 1 [phone] 981-LUNZ (2987) Website: www.safehorizon.org/.National Suicide Prevention Lifeline 3 (629) 870-6999/.  [employer]  1 (304) LIFENET (494-1191)/.  [facility]  [phone] [place] [id number] [phone]   Hours:  Monday through Friday from 9 AM to 3 PM/.  [place] [employer] - Veterans Crisis Line  [phone], Option 1

## 2022-09-16 NOTE — BH DISCHARGE NOTE NURSING/SOCIAL WORK/PSYCH REHAB - NSDCADDINFO1FT_PSY_ALL_CORE
this appointment is on site. face to face.   unless you call them  two days prior to arrange for virtual.   there is free  parking.    this appointment is on-site. face to face. this appointment is on-site. face to face.  Virtual is available  - please discuss with Dr Frias.

## 2022-09-16 NOTE — BH DISCHARGE NOTE NURSING/SOCIAL WORK/PSYCH REHAB - PATIENT PORTAL LINK FT
You can access the FollowMyHealth Patient Portal offered by [facility] by registering at the following website: [email or web address]. By joining [facility]’s FollowMyHealth portal, you will also be able to view your health information using other applications (apps) compatible with our system.

## 2022-09-17 RX ORDER — POLYETHYLENE GLYCOL 3350 17 G/17G
17 POWDER, FOR SOLUTION ORAL DAILY
Refills: 0 | Status: DISCONTINUED | OUTPATIENT
Start: 2022-09-17 | End: 2022-09-19

## 2022-09-17 RX ADMIN — HALOPERIDOL DECANOATE 10 MILLIGRAM(S): 100 INJECTION INTRAMUSCULAR at 09:30

## 2022-09-17 RX ADMIN — Medication 1 MILLIGRAM(S): at 09:29

## 2022-09-17 RX ADMIN — POLYETHYLENE GLYCOL 3350 17 GRAM(S): 17 POWDER, FOR SOLUTION ORAL at 11:30

## 2022-09-17 RX ADMIN — HALOPERIDOL DECANOATE 10 MILLIGRAM(S): 100 INJECTION INTRAMUSCULAR at 20:18

## 2022-09-17 RX ADMIN — Medication 1 MILLIGRAM(S): at 20:19

## 2022-09-17 RX ADMIN — CHLORHEXIDINE GLUCONATE 15 MILLILITER(S): 213 SOLUTION TOPICAL at 09:30

## 2022-09-17 RX ADMIN — CHLORHEXIDINE GLUCONATE 15 MILLILITER(S): 213 SOLUTION TOPICAL at 20:18

## 2022-09-18 RX ADMIN — Medication 1 MILLIGRAM(S): at 20:13

## 2022-09-18 RX ADMIN — POLYETHYLENE GLYCOL 3350 17 GRAM(S): 17 POWDER, FOR SOLUTION ORAL at 10:19

## 2022-09-18 RX ADMIN — HALOPERIDOL DECANOATE 10 MILLIGRAM(S): 100 INJECTION INTRAMUSCULAR at 10:14

## 2022-09-18 RX ADMIN — HALOPERIDOL DECANOATE 10 MILLIGRAM(S): 100 INJECTION INTRAMUSCULAR at 20:13

## 2022-09-18 RX ADMIN — CHLORHEXIDINE GLUCONATE 15 MILLILITER(S): 213 SOLUTION TOPICAL at 10:15

## 2022-09-18 RX ADMIN — CHLORHEXIDINE GLUCONATE 15 MILLILITER(S): 213 SOLUTION TOPICAL at 20:13

## 2022-09-18 RX ADMIN — Medication 1 MILLIGRAM(S): at 10:13

## 2022-09-19 VITALS — TEMPERATURE: 99 F

## 2022-09-19 PROCEDURE — 99238 HOSP IP/OBS DSCHRG MGMT 30/<: CPT

## 2022-09-19 RX ORDER — HALOPERIDOL DECANOATE 100 MG/ML
1 INJECTION INTRAMUSCULAR
Qty: 60 | Refills: 0
Start: 2022-09-19 | End: 2022-10-18

## 2022-09-19 RX ORDER — HALOPERIDOL DECANOATE 100 MG/ML
150 INJECTION INTRAMUSCULAR
Qty: 1.5 | Refills: 0
Start: 2022-09-19

## 2022-09-19 RX ORDER — BENZTROPINE MESYLATE 1 MG
1 TABLET ORAL
Qty: 60 | Refills: 0
Start: 2022-09-19 | End: 2022-10-18

## 2022-09-19 RX ADMIN — Medication 1 MILLIGRAM(S): at 08:52

## 2022-09-19 RX ADMIN — HALOPERIDOL DECANOATE 10 MILLIGRAM(S): 100 INJECTION INTRAMUSCULAR at 08:52

## 2022-09-19 RX ADMIN — CHLORHEXIDINE GLUCONATE 15 MILLILITER(S): 213 SOLUTION TOPICAL at 08:52

## 2022-09-19 NOTE — BH INPATIENT PSYCHIATRY PROGRESS NOTE - NSTXPROBPSYCHO_PSY_ALL_CORE
PSYCHOTIC SYMPTOMS

## 2022-09-19 NOTE — BH INPATIENT PSYCHIATRY PROGRESS NOTE - NSBHCONTPROVIDER_PSY_ALL_CORE
Not applicable

## 2022-09-19 NOTE — BH INPATIENT PSYCHIATRY PROGRESS NOTE - NSTXDISORGINTERMD_PSY_ALL_CORE
Med titration

## 2022-09-19 NOTE — BH INPATIENT PSYCHIATRY PROGRESS NOTE - NSTXPROBOTHER_PSY_ALL_CORE
OTHER PROBLEM

## 2022-09-19 NOTE — BH INPATIENT PSYCHIATRY PROGRESS NOTE - NSBHFUPINTERVALHXFT_PSY_A_CORE
Chart reviewed and case discussed with treatment team. Staff report patient has been calm and cooperative. Has been med compliant. Was anxious to be discharged. Denied depressed mood or SI. Denied AH.

## 2022-09-19 NOTE — BH INPATIENT PSYCHIATRY PROGRESS NOTE - NSBHCHARTREVIEWVS_PSY_A_CORE FT
Vital Signs Last 24 Hrs  T(C): 37.2 (09-19-22 @ 15:43), Max: 37.2 (09-19-22 @ 15:43)  T(F): 98.9 (09-19-22 @ 15:43), Max: 98.9 (09-19-22 @ 15:43)  HR: --  BP: --  BP(mean): --  RR: --  SpO2: 98% (09-19-22 @ 08:27) (98% - 98%)    Orthostatic VS  09-19-22 @ 08:27  Lying BP: --/-- HR: --  Sitting BP: 131/94 HR: 86  Standing BP: 121/69 HR: 105  Site: --  Mode: --  Orthostatic VS  09-18-22 @ 08:23  Lying BP: --/-- HR: --  Sitting BP: 117/70 HR: 65  Standing BP: 111/70 HR: 75  Site: --  Mode: --

## 2022-09-19 NOTE — BH INPATIENT PSYCHIATRY PROGRESS NOTE - NSTXDCOTHRGOAL_PSY_ALL_CORE
Patient will report improved mood and insight into coping skills for symptoms, and with no SIIP.
Patient will report improved mood and insight into coping skills for symptoms, and with no SIIP.
The patient will participate in treatment and improve mental statusin order to effectively engage in dischrge planning
pt will comply with treatment and improve mental status in order to effectively engage with dc planning.
pt will comply with treatment and improve mental status in order to effectively engage with discharge planning.  AOT and ACT are planned by team.
Pt. will comply with treatment recommendations within seven days.
The patient will participate in treatment and improve mental statusin order to effectively engage in dischrge planning
pt will comply with treatment and improve mental status in order to effectively engage with dc planning.
pt will comply with treatment and improve mental status in order to effectively engage with discharge planning.  AOT and ACT are planned by team.
pt will participate with treatment and improve mental status in order to effectively engage for discharge planning.
patient will comply with treatment and improve mental status in order to effectively and allow family to interface with team.
The patient will participate in treatment and improve mental status in order to effectively engage in discharge planning.
patient will comply with treatment and improve mental status in order to effectively and allow family to interface with team.
pt will comply with treatment in order to improve mental status and be able to effectively engage with dc planning
The patient will participate in treatment and improve mental statusin order to effectively engage in dischrge planning
pt will comply with treatment and improve mental status in order to effectively engage with dc planning.
Pt. will comply with treatment recommendations within seven days.
The patient will participate in treatment and improve mental status in order to effectively engage in discharge planning.
pt will comply with treatment in order to improve mental status and be able to effectively engage with dc planning
The patient will participate in treatment and improve mental status in order to effectively engage in discharge planning.
The patient will participate in treatment and improve mental statusin order to effectively engage in dischrge planning
pt will comply with treatment and improve mental status in order to effectively engage with dc planning.
pt will comply with treatment in order to improve mental status and be able to effectively engage with dc planning
pt will comply with treatment to improve mental status and be able to effectively engage with dc planning.
Pt. will comply with treatment recommendations within seven days.
pt will comply with treatment and improve mental status in order to effectively engage with dc planning.
Pt. will comply with treatment recommendations within seven days.
pt will participate with treatment and improve mental status in order to effectively engage for discharge planning.
Patient will report improved mood and insight into coping skills for symptoms, and with no SIIP.
Pt. will comply with treatment recommendations within seven days.
pt will comply with treatment and improve mental status in order to effectively engage with discharge planning.  AOT and ACT are planned by team.
pt will comply with treatment to improve mental status and be able to effectively engage with dc planning.
pt will participate with treatment and improve mental status in order to effectively engage for discharge planning.
Pt. will comply with treatment recommendations within seven days.
pt will comply with treatment and improve mental status in order to effectively engage with dc planning.
pt will comply with treatment and improve mental status in order to effectively engage with dc planning.
Patient will report improved mood and insight into coping skills for symptoms, and with no SIIP.
Pt. will comply with treatment recommendations within seven days.
pt will comply with treatment and improve mental status in order to effectively engage with dc planning.
pt will comply with treatment and improve mental status in order to effectively engage with dc planning.
pt will comply with treatment in order to improve mental status and be able to effectively engage with dc planning
Pt. will comply with treatment recommendations within seven days.
pt will comply with treatment and improve mental status in order to effectively engage with discharge planning.  AOT and ACT are planned by team.
Pt. will comply with treatment recommendations within seven days.
pt will comply with treatment and improve mental status in order to effectively engage with discharge planning.  AOT and ACT are planned by team.
pt will comply with treatment in order to improve mental status and be able to effectively engage with dc planning
pt will comply with treatment to improve mental status and be able to effectively engage with dc planning.
Pt. will comply with treatment recommendations within seven days.
Pt. will comply with treatment recommendations within seven days.
patient will comply with treatment and improve mental status in order to effectively and allow family to interface with team.
pt will comply with treatment and improve mental status in order to effectively engage with dc planning.
The patient will participate in treatment and improve mental status in order to effectively engage in discharge planning.
pt will comply with treatment and improve mental status in order to effectively engage with dc planning.
The patient will participate in treatment and improve mental statusin order to effectively engage in dischrge planning
patient will comply with treatment and improve mental status in order to effectively and allow family to interface with team.
pt will participate with treatment and improve mental status in order to effectively engage for discharge planning.
pt will comply with treatment to improve mental status and be able to effectively engage with dc planning.
pt will comply with treatment to improve mental status and be able to effectively engage with dc planning.
The patient will participate in treatment and improve mental status in order to effectively engage in discharge planning.
pt will comply with treatment and improve mental status in order to effectively engage with dc planning.
Patient will report improved mood and insight into coping skills for symptoms, and with no SIIP.
pt will comply with treatment and improve mental status in order to effectively engage with dc planning.

## 2022-09-19 NOTE — BH INPATIENT PSYCHIATRY PROGRESS NOTE - NSTXPSYCHOGOAL_PSY_ALL_CORE
Will engage in a 15 minute conversation with no irrational content
Will be able to report experiencing hallucinations to staff
Will report using a mindfulness skill to be able to read 5 pages of a book daily despite hallucinations
Will engage in a 15 minute conversation with no irrational content
Will engage in a 15 minute conversation with no irrational content
Will identify 2 coping skills that assist with focus on reality
Will engage in a 15 minute conversation with no irrational content
Will be able to report experiencing hallucinations to staff
Will engage in a 15 minute conversation with no irrational content
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
## 2022-09-19 NOTE — BH INPATIENT PSYCHIATRY PROGRESS NOTE - NSTXDCOTHRDATETRGT_PSY_ALL_CORE
13-Jul-2022
09-Aug-2022
07-Sep-2022
24-Aug-2022
13-Jul-2022
19-Sep-2022
27-Jun-2022
27-Jul-2022
09-Aug-2022
27-Jun-2022
13-Jul-2022
24-Aug-2022
30-Aug-2022
26-Jul-2022
24-Aug-2022
19-Sep-2022
24-Aug-2022
24-Aug-2022
27-Jul-2022
27-Jul-2022
30-Aug-2022
20-Sep-2022
26-Jul-2022
02-Aug-2022
16-Aug-2022
13-Jul-2022
27-Jul-2022
09-Aug-2022
13-Jul-2022
17-Aug-2022
26-Jul-2022
13-Jul-2022
19-Jul-2022
19-Sep-2022
24-Aug-2022
07-Sep-2022
13-Jul-2022
19-Jul-2022
24-Aug-2022
26-Jul-2022
28-Sep-2022
06-Sep-2022
13-Jul-2022
02-Aug-2022
28-Sep-2022
27-Jun-2022
28-Sep-2022
06-Sep-2022
12-Jul-2022
24-Aug-2022
02-Aug-2022
27-Jul-2022
19-Sep-2022
16-Aug-2022
27-Jul-2022
20-Sep-2022
26-Jul-2022
30-Aug-2022
17-Aug-2022
30-Aug-2022
19-Sep-2022
30-Aug-2022

## 2022-09-19 NOTE — BH INPATIENT PSYCHIATRY PROGRESS NOTE - NSTREATMENTCERT_PSY_ALL_CORE
.

## 2022-09-19 NOTE — BH INPATIENT PSYCHIATRY PROGRESS NOTE - NSICDXBHSECONDARYDX_PSY_ALL_CORE
Tardive dyskinesia   G24.01  
Tardive dyskinesia   G24.01  Medical non-compliance   Z91.19  Poor dental hygiene   Z91.89  
Tardive dyskinesia   G24.01  
Tardive dyskinesia   G24.01  Medical non-compliance   Z91.19  Poor dental hygiene   Z91.89  
Tardive dyskinesia   G24.01  
Tardive dyskinesia   G24.01  Medical non-compliance   Z91.19  Poor dental hygiene   Z91.89  

## 2022-09-19 NOTE — BH INPATIENT PSYCHIATRY PROGRESS NOTE - NSTXCOPEDATETRGT_PSY_ALL_CORE
14-Sep-2022
24-Aug-2022
03-Aug-2022
10-Aug-2022
06-Jul-2022
13-Jul-2022
13-Jul-2022
27-Jul-2022
27-Jul-2022
24-Aug-2022
27-Jul-2022
24-Aug-2022
07-Sep-2022
24-Aug-2022
03-Aug-2022
24-Aug-2022
13-Jul-2022
06-Jul-2022
17-Aug-2022
20-Jul-2022
21-Sep-2022
06-Jul-2022
24-Aug-2022
14-Sep-2022
10-Aug-2022
03-Aug-2022
06-Jul-2022
13-Jul-2022
27-Jul-2022
24-Aug-2022
14-Sep-2022
27-Jul-2022
24-Aug-2022
21-Sep-2022
21-Sep-2022
27-Jul-2022
10-Aug-2022
24-Aug-2022
24-Aug-2022
21-Sep-2022
24-Aug-2022
13-Jul-2022
03-Aug-2022
07-Sep-2022
24-Aug-2022
27-Jul-2022
07-Sep-2022
10-Aug-2022
14-Sep-2022
10-Aug-2022
27-Jul-2022
14-Sep-2022
03-Aug-2022
07-Sep-2022

## 2022-09-19 NOTE — BH INPATIENT PSYCHIATRY PROGRESS NOTE - MSE UNSTRUCTURED FT
Appearance: fair grooming; mild TD of oral muscles and hands; no cogwheeling or tremors; gait is abnormal but stable; poor dentition  Behavior: calm, superficially cooperative, no psychomotor retardation or agitation  Speech: soft spoken, normal rate, fluent  Mood: "ok"  Affect: mildly dysphoric, constricted, stable  Thought process: vague, concrete, inconsistent  Thought content: mild paranoid delusions remain but does not manifest spontaneously; denies SI/HI  Perceptual disturbances: denies AH/VH  Orientation: well oriented  Fund of knowledge: limited  Insight: poor  Judgement: impulse control intact

## 2022-09-19 NOTE — BH INPATIENT PSYCHIATRY DISCHARGE NOTE - HOSPITAL COURSE
Patient was initially started on abilify but there was limited effectiveness. She was later switched to risperidone and optimized but she developed agranulocytosis. For this reason she was switched to haldol and titrated to 15mg po bid. She developed some bradykinesia so dose was reduced to 10mg po bid and cogentin 1mg po bid was added. She was started on haldol decanoate and given 150mg total in split doses, 2nd dose given on 8/31/22. Follow up dose can be given on or about 9/28.     Patient was calm and largely withdrawn during this admission. She continued to hold paranoid beliefs about her landlord but eventually resolved to defer interactions with him to her daughter to avoid conflicts with him in the future. She also eventually complied with obtaining records from prior providers as well as for team to discuss treatment and care with her daughter.     She had a protracted course during this admission partially due to the agranulocytosis she developed but also due to treatment team attempting to enroll her in ACT and AOT. However, due to difficulty and length of time in obtaining records to meet criteria for AOT, patient's stay was longer than desired. Unfortunately, due to her medicaid status expiring during her stay, ACT team could not be assigned. Due to her protracted stay and impact on her mood (staff reported patient was starting to appear depressed), treatment team made a decision to discharge her without ACT/AOT. Writer explained to patient's daughter that ACT/AOT can be attempted again in the future should patient be admitted within a short period of her discharge. Patient's retention order is expiring and due to her calm and cooperative behavior, there is no longer grounds to advocate for further involuntary treatment. Patient will be discharged.

## 2022-09-19 NOTE — BH INPATIENT PSYCHIATRY PROGRESS NOTE - NSTXDISORGGOALOTHER_PSY_ALL_CORE
Over the next week, pt will work on completing a safety plan including at least 2 warning signs for improved symptom management and sustained recovery.
Complete a safety plan
Over the next week, pt will work on completing a safety plan including at least 2 warning signs for improved symptom management and sustained recovery.

## 2022-09-19 NOTE — BH INPATIENT PSYCHIATRY PROGRESS NOTE - NSTXPROBDISORG_PSY_ALL_CORE
DISORGANIZATION OF THOUGHT/BEHAVIOR

## 2022-09-19 NOTE — BH INPATIENT PSYCHIATRY PROGRESS NOTE - NSTXCOPEDATEEST_PSY_ALL_CORE
18-Jun-2022
27-Jul-2022
29-Jun-2022
18-Jun-2022
27-Jul-2022
18-Jun-2022
29-Jun-2022
29-Jun-2022
18-Jun-2022
18-Jun-2022
27-Jul-2022
18-Jun-2022
29-Jun-2022
18-Jun-2022
27-Jul-2022
18-Jun-2022
18-Jun-2022
27-Jul-2022
18-Jun-2022

## 2022-09-19 NOTE — BH INPATIENT PSYCHIATRY PROGRESS NOTE - NSTXPROBDCOTHR_PSY_ALL_CORE
DISCHARGE ISSUE - OTHER

## 2022-09-19 NOTE — BH INPATIENT PSYCHIATRY PROGRESS NOTE - NSBHFUPMEDSE_PSY_A_CORE
None known
Yes
None known
Yes
None known
Yes
None known
Yes
None known
Yes
None known
None known
Yes
None known
Yes
None known
Yes
Yes
None known
None known
Yes
None known
Yes
None known
Yes
None known
Yes
Yes
None known

## 2022-09-19 NOTE — BH INPATIENT PSYCHIATRY PROGRESS NOTE - NSTXDCOTHRINTERMD_PSY_ALL_CORE
Defer to SW. Will provide necessary documentation for disposition planning.

## 2022-09-19 NOTE — BH INPATIENT PSYCHIATRY DISCHARGE NOTE - NSBHDCHANDOFFFT_PSY_ALL_CORE
Emailed Dr. Brito who will see patient for intake on 9/26/22. Phone number was left to reach this writer with any questions or concerns.

## 2022-09-19 NOTE — BH INPATIENT PSYCHIATRY PROGRESS NOTE - NSTXDISORGDATEEST_PSY_ALL_CORE
08-Jul-2022
18-Jun-2022
08-Jul-2022
18-Jun-2022
18-Jun-2022
08-Jul-2022
08-Jul-2022
18-Jun-2022
08-Jul-2022
18-Jun-2022
08-Jul-2022
18-Jun-2022
08-Jul-2022
18-Jun-2022
08-Jul-2022
08-Jul-2022
18-Jun-2022
29-Jun-2022
18-Jun-2022
08-Jul-2022
18-Jun-2022
29-Jun-2022
08-Jul-2022
18-Jun-2022
08-Jul-2022
18-Jun-2022
08-Jul-2022
18-Jun-2022
08-Jul-2022
18-Jun-2022
08-Jul-2022

## 2022-09-19 NOTE — BH INPATIENT PSYCHIATRY DISCHARGE NOTE - HPI (INCLUDE ILLNESS QUALITY, SEVERITY, DURATION, TIMING, CONTEXT, MODIFYING FACTORS, ASSOCIATED SIGNS AND SYMPTOMS)
Pt is a 72 year old woman, domiciled with daughter, no dependents, no known past medical history, no substance abuse,  hx of paranoid schizophrenia, multiple prior psychiatric hospitalizations (was seen in [facility] ED 2016 with presentation very similar to current, possibly more recent hospitalization at [facility]  several months ago), hx of medication nonadherence, no hx of suicide attempts or suicidality, prior hx of aggression per daughter, BIB EMS to [facility] 6/17 activated by patient’s daughter when pt was found throwing [employer] tools around home and verbally aggressive. Collateral reported patient non-adherent with treatment, increasingly psychotic. Patient was calm in the ED, but delusional, believing that landlord has been trying to prevent her from getting a 's license, other   delusions of control. Was admitted on 9.39, olanzapine 2.5 mg QHS started.      Pt is a 72 year old woman, domiciled with daughter, no dependents, no known past medical history, no substance abuse,  hx of paranoid schizophrenia, multiple prior psychiatric hospitalizations (was seen in [facility] ED 2016 with presentation very similar to current, possibly more recent hospitalization at [facility]  several months ago), hx of medication nonadherence, no hx of suicide attempts or suicidality, has prior hx of aggression per daughter, BIB EMS to [facility] 6/17 activated by patient’s daughter when pt was found throwing [employer] tools around home and verbally aggressive. Collateral reported patient non-adherent with treatment, increasingly psychotic. Patient was calm in the ED, but delusional, believing that landlord has been trying to prevent her from getting a 's license, other delusions of control. Was admitted on 9.39, olanzapine 2.5 mg QHS started.

## 2022-09-19 NOTE — BH INPATIENT PSYCHIATRY PROGRESS NOTE - NSBHATTESTTYPEVISIT_PSY_A_CORE
Attending Only
On-site Attending with Resident/Fellow/Student and CHANG (99XXX codes)
Attending Only
On-site Attending with Resident/Fellow/Student and CHANG (99XXX codes)
Attending Only
On-site Attending with Resident/Fellow/Student and CAHNG (99XXX codes)
Attending Only

## 2022-09-19 NOTE — SOCIAL WORK POST DISCHARGE FOLLOW UP NOTE - NSBHSWFOLLOWUP_PSY_ALL_CORE_FT
outreach call. spoke to daughter sue    advised.  update/correction  aftercare appointment on 9/21 at 9 with same MD Dr Frias.  she acknowledged.

## 2022-09-19 NOTE — BH INPATIENT PSYCHIATRY DISCHARGE NOTE - ATTENDING DISCHARGE PHYSICAL EXAMINATION:
Appearance: adequate grooming; mild TD of oral muscles and hands; no cogwheeling or tremors; gait is abnormal due to TD but stable; poor dentition  Behavior: calm, guarded, superficially related, no psychomotor retardation or agitation  Speech: soft spoken, normal rate, fluent  Mood: "fine"  Affect: mildly dysphoric, constricted, stable  Thought process: vague, concrete  Thought content: residual paranoid delusions remain but does not manifest spontaneously or affect functioning at this time; denies SI/HI  Perceptual disturbances: denies AH/VH  Orientation: well oriented  Insight: limited  Judgement: impulse control intact

## 2022-09-19 NOTE — BH INPATIENT PSYCHIATRY PROGRESS NOTE - NSTXPSYCHOMODTX_PSY_ALL_CORE
Yes

## 2022-09-19 NOTE — BH INPATIENT PSYCHIATRY PROGRESS NOTE - NSBHPROGSUIC_PSY_A_CORE
no

## 2022-09-19 NOTE — BH INPATIENT PSYCHIATRY DISCHARGE NOTE - NSDCCPCAREPLAN_GEN_ALL_CORE_FT
PRINCIPAL DISCHARGE DIAGNOSIS  Diagnosis: Schizophrenia  Assessment and Plan of Treatment:        PRINCIPAL DISCHARGE DIAGNOSIS  Diagnosis: Schizophrenia  Assessment and Plan of Treatment: continue haldol

## 2022-09-19 NOTE — BH INPATIENT PSYCHIATRY PROGRESS NOTE - NSBHLEGALSTATUSCHANGE_PSY_ALL_CORE
No

## 2022-09-19 NOTE — BH INPATIENT PSYCHIATRY PROGRESS NOTE - NSTXOTHERGOAL_PSY_ALL_CORE
Pt will comply with treatment
Pt will comply with treatment
patient will comply with treatment and improve mental status in order to effectively and allow family to interface with team.
patient will comply with treatment and improve mental status in order to effectively and allow family to interface with team.
Pt will comply with treatment
Pt will comply with treatment
patient will comply with treatment and improve mental status in order to effectively and allow family to interface with team.
patient will comply with treatment and improve mental status in order to effectively and allow family to interface with team.
Pt will comply with treatment
patient will comply with treatment and improve mental status in order to effectively and allow family to interface with team.
patient will comply with treatment and improve mental status in order to effectively and allow family to interface with team.
Pt will comply with treatment
Pt will comply with treatment
patient will comply with treatment and improve mental status in order to effectively and allow family to interface with team.
patient will comply with treatment and improve mental status in order to effectively and allow family to interface with team.
Pt will comply with treatment
patient will comply with treatment and improve mental status in order to effectively and allow family to interface with team.
Pt will comply with treatment
patient will comply with treatment and improve mental status in order to effectively and allow family to interface with team.
Pt will comply with treatment
patient will comply with treatment and improve mental status in order to effectively and allow family to interface with team.
Pt will comply with treatment
Pt will comply with treatment
patient will comply with treatment and improve mental status in order to effectively and allow family to interface with team.
Pt will comply with treatment
patient will comply with treatment and improve mental status in order to effectively and allow family to interface with team.
patient will comply with treatment and improve mental status in order to effectively and allow family to interface with team.
Pt will comply with treatment
patient will comply with treatment and improve mental status in order to effectively and allow family to interface with team.
Pt will comply with treatment
patient will comply with treatment and improve mental status in order to effectively and allow family to interface with team.
Pt will comply with treatment
patient will comply with treatment and improve mental status in order to effectively and allow family to interface with team.
Pt will comply with treatment
patient will comply with treatment and improve mental status in order to effectively and allow family to interface with team.
Pt will comply with treatment
Pt will comply with treatment

## 2022-09-19 NOTE — BH INPATIENT PSYCHIATRY PROGRESS NOTE - CURRENT MEDICATION
MEDICATIONS  (STANDING):  benztropine 1 milliGRAM(s) Oral two times a day  chlorhexidine 0.12% Liquid 15 milliLiter(s) Swish and Spit two times a day  haloperidol    Concentrate 10 milliGRAM(s) Oral two times a day    MEDICATIONS  (PRN):  haloperidol    Injectable 5 milliGRAM(s) IntraMuscular two times a day PRN if haldol PO refused  polyethylene glycol 3350 17 Gram(s) Oral daily PRN Constipation

## 2022-09-19 NOTE — BH INPATIENT PSYCHIATRY PROGRESS NOTE - NSTXDISORGGOAL_PSY_ALL_CORE
Will identify 2 coping skills that assist in organizing
Will make at least 3 goal and reality oriented statements during therapy
Other...
Other...
Will make at least 3 goal and reality oriented statements during therapy
Other...
Will identify 2 coping skills that assist in organizing
Will make at least 3 goal and reality oriented statements during therapy
Will make at least 3 goal and reality oriented statements during therapy
Will demonstrate the ability to maintain reality orientation and communicate clearly with others during 2 conversations with staff daily
Other...
Will make at least 3 goal and reality oriented statements during therapy
Other...
Other...
Will make at least 3 goal and reality oriented statements during therapy
Other...
Will make at least 3 goal and reality oriented statements during therapy
Other...
Will demonstrate related thoughts for 5 min in conversation
Will make at least 3 goal and reality oriented statements during therapy
Other...
Will identify 2 coping skills that assist in organizing
Will demonstrate the ability to maintain reality orientation and communicate clearly with others during 2 conversations with staff daily
Will make at least 3 goal and reality oriented statements during therapy
Other...
Will make at least 3 goal and reality oriented statements during therapy
Will make at least 3 goal and reality oriented statements during therapy
Other...
Will make at least 3 goal and reality oriented statements during therapy
Other...

## 2022-09-19 NOTE — BH INPATIENT PSYCHIATRY PROGRESS NOTE - NSBHFUPINTERVALCCFT_PSY_A_CORE
psychosis
Delusions about Landlord harassing her, disorganized/grossly psychotic
psychosis
zaida
psychosis
Patient followed for schizophrenia 
psychosis
Delusions about Landlord harassing her, disorganized/grossly psychotic
psychosis
zaida
Delusions about Landlord harassing her, disorganized/grossly psychotic
psychosis
Delusions about Landlord harassing her, disorganized/grossly psychotic
psychosis
Delusions about Landlord harassing her, disorganized/grossly psychotic
psychosis
Patient denied constipation dizziness, akathisia
psychosis
psychosis
Patient denied constipation dizziness, akathisia
psychosis
Delusions about Landlord harassing her, disorganized/grossly psychotic
psychosis
Patient denied constipation dizziness, akathisia
psychosis
zaida
Patient denied constipation dizziness, akathisia
paranoia, disorganized behavior
psychosis

## 2022-09-19 NOTE — BH INPATIENT PSYCHIATRY PROGRESS NOTE - NSTXOTHERPROGRES_PSY_ALL_CORE
No Change
No Change
Met - goal discontinued
Met - goal discontinued
No Change
Met - goal discontinued
No Change
Met - goal discontinued
Met - goal discontinued
Improving
No Change
Met - goal discontinued
No Change
Met - goal discontinued
Met - goal discontinued
No Change
Met - goal discontinued
No Change
Met - goal discontinued
No Change
Met - goal discontinued
No Change
Met - goal discontinued
No Change
Improving
Met - goal discontinued
No Change
Met - goal discontinued
No Change
No Change
Met - goal discontinued
No Change

## 2022-09-19 NOTE — BH INPATIENT PSYCHIATRY PROGRESS NOTE - NSTXOTHERDATETRGT_PSY_ALL_CORE
07-Sep-2022
09-Aug-2022
09-Aug-2022
07-Sep-2022
24-Aug-2022
04-Aug-2022
04-Jul-2022
24-Aug-2022
24-Aug-2022
04-Aug-2022
14-Jul-2022
14-Jul-2022
28-Jul-2022
07-Sep-2022
09-Aug-2022
07-Sep-2022
14-Jul-2022
14-Jul-2022
24-Aug-2022
07-Sep-2022
14-Jul-2022
24-Aug-2022
07-Sep-2022
14-Jul-2022
24-Aug-2022
07-Sep-2022
07-Sep-2022
28-Jul-2022
07-Sep-2022
24-Aug-2022
04-Aug-2022
07-Sep-2022
14-Jul-2022
09-Aug-2022
14-Jul-2022
04-Jul-2022
14-Jul-2022
24-Aug-2022
07-Sep-2022
24-Aug-2022
14-Jul-2022
28-Jul-2022
14-Jul-2022
07-Sep-2022
09-Aug-2022
09-Aug-2022
28-Jul-2022
14-Jul-2022
07-Sep-2022
14-Jul-2022
28-Jul-2022
04-Jul-2022
07-Sep-2022
14-Jul-2022
24-Aug-2022
07-Sep-2022
04-Aug-2022
07-Sep-2022
07-Sep-2022

## 2022-09-19 NOTE — BH INPATIENT PSYCHIATRY PROGRESS NOTE - NSTXDISORGPROGRES_PSY_ALL_CORE
Improving
No Change
Improving
No Change
No Change
Met - goal discontinued
No Change
Improving
No Change
No Change
Improving
No Change
No Change
Improving
No Change
Improving
Met - goal discontinued
Improving
No Change
Improving
No Change
No Change
Improving
Improving
No Change
No Change
Improving
No Change
Improving
No Change
Improving
Improving
No Change
Improving
Improving
No Change
Met - goal discontinued
Improving
No Change

## 2022-09-19 NOTE — BH INPATIENT PSYCHIATRY PROGRESS NOTE - NSTXCOPEPROGRES_PSY_ALL_CORE
Improving
Improving
No Change
Improving
No Change
Improving
No Change
Improving
No Change
Improving

## 2022-09-19 NOTE — BH INPATIENT PSYCHIATRY PROGRESS NOTE - NSTXCOPEGOAL_PSY_ALL_CORE
Identify and utilize 2 coping skills that meet their needs

## 2022-09-19 NOTE — BH INPATIENT PSYCHIATRY PROGRESS NOTE - NSTXDCOTHRPROGRES_PSY_ALL_CORE
No Change
Improving
Improving
No Change
Improving
No Change
Improving
No Change
Improving
No Change
Improving
No Change
Improving
No Change
No Change
Improving
Improving
No Change
Improving
No Change
Improving
Improving
No Change
Improving
No Change
Improving
Improving
No Change
Improving
Improving

## 2022-09-19 NOTE — BH INPATIENT PSYCHIATRY PROGRESS NOTE - NSTXPROBCOPE_PSY_ALL_CORE
COPING, INEFFECTIVE

## 2022-09-19 NOTE — BH INPATIENT PSYCHIATRY DISCHARGE NOTE - OTHER PAST PSYCHIATRIC HISTORY (INCLUDE DETAILS REGARDING ONSET, COURSE OF ILLNESS, INPATIENT/OUTPATIENT TREATMENT)
Pt. is a 73 y/o female with history of paranoid schizophrenia with multiple psych. hospitalizations the most recent at [facility] 4-5 months ago.  Pt. has history of suicide attempts or suicidality, prior hx. of aggression as per daughter.  Pt. was bib EMS activated by pt.'s daughter when pt was verbally aggressive and was found throwing landlord's tools around the home.  Pt.'s daughter stated the landlord called her because pt. was screaming at the top of her lungs throwing the landlord supplies and tools.  Pt. is noncompliant with medications for years and tends to discontinue meds whenever discharged.      [facility] admission  7/4/2016 - did not follow up with aftercare referral [facility]    [facility] admission 4/9/2013  Did not follow up at [facility].     Patient has a dx of schizophrenia. No known prior suicide attempts. H/o chronic noncompliance. Daughter reports there is a h/o aggression. Was seen in [facility] ED x2 for paranoid delusions leading to conflicts with neighbors/landlord in the past year. She was not considered eligible for involuntary psychiatric care at the time but was kept for observation in medicine due to family not agreeing with discharge to the community.     Daughter also reports some admissions to [facility] in the last 2 years but when records were requested, the medical record department denied there were any records.     [facility] admission  7/4/2016 - did not follow up with aftercare referral to [facility] admission 4/9/2013  Did not follow up at [facility].

## 2022-09-19 NOTE — BH INPATIENT PSYCHIATRY PROGRESS NOTE - NS ED [facility] MED ROS PSYCHIATRIC
See HPI

## 2022-09-19 NOTE — BH INPATIENT PSYCHIATRY PROGRESS NOTE - NSTXDISORGDATENEW_PSY_ALL_CORE
15-Jul-2022

## 2022-09-19 NOTE — BH INPATIENT PSYCHIATRY PROGRESS NOTE - NSTXPSYCHODATETRGT_PSY_ALL_CORE
27-Jul-2022
06-Jul-2022
24-Aug-2022
20-Jul-2022
17-Aug-2022
27-Jul-2022
07-Sep-2022
27-Jul-2022
04-Aug-2022
04-Aug-2022
07-Sep-2022
14-Jul-2022
07-Sep-2022
21-Sep-2022
27-Jul-2022
04-Aug-2022
07-Sep-2022
14-Jul-2022
27-Jul-2022
24-Aug-2022
07-Sep-2022
07-Sep-2022
24-Aug-2022
07-Sep-2022
07-Sep-2022
14-Jul-2022
21-Sep-2022
24-Aug-2022
27-Jul-2022
27-Jul-2022
24-Aug-2022
04-Aug-2022
07-Sep-2022
04-Aug-2022
21-Sep-2022
20-Jul-2022
04-Aug-2022
07-Sep-2022
14-Jul-2022
27-Jul-2022
20-Jul-2022
14-Jul-2022
14-Jul-2022
20-Jul-2022
21-Sep-2022
07-Sep-2022
20-Jul-2022
24-Aug-2022
24-Aug-2022
07-Sep-2022
17-Aug-2022
14-Jul-2022
14-Jul-2022
17-Aug-2022
07-Sep-2022
17-Aug-2022
27-Jul-2022
27-Jul-2022
24-Aug-2022

## 2022-09-19 NOTE — BH INPATIENT PSYCHIATRY PROGRESS NOTE - NSICDXBHPRIMARYDX_PSY_ALL_CORE
Schizophrenia   F20.9  
Schizophrenia   F20.9  
Paranoid schizophrenia   F20.0  
Schizophrenia   F20.9  
Paranoid schizophrenia   F20.0  
Schizophrenia   F20.9  
Paranoid schizophrenia   F20.0  
Schizophrenia   F20.9  
Paranoid schizophrenia   F20.0  
Schizophrenia   F20.9  
Paranoid schizophrenia   F20.0  
Schizophrenia   F20.9  
Paranoid schizophrenia   F20.0  
Schizophrenia   F20.9  
Paranoid schizophrenia   F20.0  
Schizophrenia   F20.9  

## 2022-09-19 NOTE — BH INPATIENT PSYCHIATRY PROGRESS NOTE - NSTXOTHERDATEEST_PSY_ALL_CORE
28-Jun-2022
30-Jun-2022
30-Jun-2022
28-Jun-2022
30-Jun-2022
28-Jun-2022
30-Jun-2022
28-Jun-2022
30-Jun-2022
28-Jun-2022
30-Jun-2022
28-Jun-2022
30-Jun-2022
28-Jun-2022
30-Jun-2022
30-Jun-2022
28-Jun-2022

## 2022-09-19 NOTE — BH INPATIENT PSYCHIATRY PROGRESS NOTE - NSTXPSYCHOPROGRES_PSY_ALL_CORE
Improving
No Change
Improving
No Change
Improving
No Change
No Change
Improving
No Change
Improving
No Change
Improving
No Change
Improving
No Change
Improving
No Change
Improving

## 2022-09-19 NOTE — BH INPATIENT PSYCHIATRY PROGRESS NOTE - NSBHCONSULTIPREASON_PSY_A_CORE
danger to self; mental illness expected to respond to inpatient care

## 2022-09-19 NOTE — BH INPATIENT PSYCHIATRY PROGRESS NOTE - MSE OPTIONS
Unstructured MSE

## 2022-09-19 NOTE — BH INPATIENT PSYCHIATRY PROGRESS NOTE - PRN MEDS
MEDICATIONS  (PRN):  haloperidol    Injectable 5 milliGRAM(s) IntraMuscular two times a day PRN if haldol PO refused  polyethylene glycol 3350 17 Gram(s) Oral daily PRN Constipation

## 2022-09-19 NOTE — BH INPATIENT PSYCHIATRY PROGRESS NOTE - NSTREATMENTCERTY_PSY_ALL_CORE

## 2022-09-19 NOTE — BH INPATIENT PSYCHIATRY PROGRESS NOTE - NSTXDISORGDATETRGT_PSY_ALL_CORE
08-Jul-2022
19-Sep-2022
09-Sep-2022
22-Jul-2022
19-Aug-2022
22-Jul-2022
25-Jun-2022
25-Jun-2022
05-Aug-2022
27-Jul-2022
02-Sep-2022
06-Jul-2022
19-Aug-2022
19-Aug-2022
21-Sep-2022
22-Jul-2022
09-Sep-2022
22-Jul-2022
07-Sep-2022
19-Aug-2022
23-Sep-2022
02-Sep-2022
02-Sep-2022
22-Jul-2022
25-Jun-2022
12-Aug-2022
05-Aug-2022
08-Jul-2022
14-Sep-2022
20-Jul-2022
09-Sep-2022
01-Jul-2022
06-Jul-2022
19-Aug-2022
25-Jun-2022
19-Aug-2022
16-Sep-2022
06-Jul-2022
08-Jul-2022
08-Jul-2022
01-Jul-2022
09-Sep-2022
12-Aug-2022
02-Sep-2022
12-Aug-2022
16-Sep-2022
21-Sep-2022
05-Aug-2022
19-Aug-2022
22-Jul-2022
19-Aug-2022
16-Sep-2022
19-Aug-2022
22-Jul-2022
08-Jul-2022
22-Jul-2022

## 2022-09-19 NOTE — BH INPATIENT PSYCHIATRY DISCHARGE NOTE - NSBHDCMEDICALFT_PSY_A_CORE
Tardive dyskinesia did improve while on the unit after antipsychotic started. Her gait became more stable. She was eating well and otherwise had no acute medical issues.

## 2022-09-19 NOTE — BH INPATIENT PSYCHIATRY DISCHARGE NOTE - NSDCMRMEDTOKEN_GEN_ALL_CORE_FT
benztropine 1 mg oral tablet: 1 tab(s) orally 2 times a day  Haldol Decanoate 100 mg/mL intramuscular solution: 150 milligram(s) intramuscularly once a month   haloperidol 10 mg oral tablet: 1 tab(s) orally 2 times a day

## 2022-09-19 NOTE — BH INPATIENT PSYCHIATRY PROGRESS NOTE - NSTXOTHERINTERMD_PSY_ALL_CORE
Med titration

## 2022-09-19 NOTE — BH INPATIENT PSYCHIATRY PROGRESS NOTE - NSTXPSYCHODATEEST_PSY_ALL_CORE
18-Jun-2022
29-Jun-2022
29-Jun-2022
18-Jun-2022
13-Jul-2022
22-Jun-2022
13-Jul-2022
18-Jun-2022
29-Jun-2022
18-Jun-2022
22-Jun-2022
18-Jun-2022
29-Jun-2022
18-Jun-2022
22-Jun-2022
22-Jun-2022
18-Jun-2022
29-Jun-2022
18-Jun-2022
18-Jun-2022
29-Jun-2022
18-Jun-2022
13-Jul-2022
18-Jun-2022
29-Jun-2022
13-Jul-2022
29-Jun-2022
18-Jun-2022

## 2022-09-19 NOTE — BH INPATIENT PSYCHIATRY PROGRESS NOTE - NSTXDCOTHRDATEEST_PSY_ALL_CORE
05-Jul-2022
23-Aug-2022
05-Jul-2022
20-Jun-2022
05-Jul-2022
20-Jun-2022
05-Jul-2022
23-Aug-2022
05-Jul-2022
13-Sep-2022
05-Jul-2022
20-Jun-2022
05-Jul-2022
05-Jul-2022
20-Jun-2022
23-Aug-2022
05-Jul-2022
23-Aug-2022
05-Jul-2022
20-Jun-2022
23-Aug-2022
05-Jul-2022
20-Jun-2022
13-Sep-2022
20-Jun-2022
05-Jul-2022
23-Aug-2022
05-Jul-2022
20-Jun-2022
23-Aug-2022
05-Jul-2022
13-Sep-2022
20-Jun-2022
23-Aug-2022
05-Jul-2022
23-Aug-2022
05-Jul-2022
23-Aug-2022
05-Jul-2022
13-Sep-2022
05-Jul-2022
05-Jul-2022
13-Sep-2022
23-Aug-2022
23-Aug-2022

## 2022-09-19 NOTE — BH INPATIENT PSYCHIATRY PROGRESS NOTE - NSBHROSSYSTEMS_PSY_ALL_CORE
Psychiatric

## 2022-09-19 NOTE — BH INPATIENT PSYCHIATRY PROGRESS NOTE - NSDCCRITERIA_PSY_ALL_CORE
75% reduction in psychosis with recovery of basic social and occupational functioning
improvement in symptoms 
75% reduction in psychosis with recovery of basic social and occupational functioning
improvement in symptoms 
75% reduction in psychosis with recovery of basic social and occupational functioning
improvement in symptoms 
improvement in symptoms 
75% reduction in psychosis with recovery of basic social and occupational functioning

## 2022-09-19 NOTE — BH INPATIENT PSYCHIATRY PROGRESS NOTE - NSBHCONSDANGERSELF_PSY_A_CORE
unable to care for self

## 2022-09-19 NOTE — BH INPATIENT PSYCHIATRY PROGRESS NOTE - NSTXPSYCHODATENEW_PSY_ALL_CORE
06-Jul-2022

## 2022-09-19 NOTE — BH INPATIENT PSYCHIATRY PROGRESS NOTE - NSTXCOPEINTERMD_PSY_ALL_CORE
med titration

## 2022-09-19 NOTE — BH INPATIENT PSYCHIATRY PROGRESS NOTE - NSTXPSYCHOINTERMD_PSY_ALL_CORE
Medication over objection and subsequent med titration. 

## 2022-09-21 ENCOUNTER — OUTPATIENT (OUTPATIENT)
Dept: OUTPATIENT SERVICES | Facility: HOSPITAL | Age: 72
LOS: 1 days | Discharge: HOME | End: 2022-09-21

## 2022-09-21 PROCEDURE — 90792 PSYCH DIAG EVAL W/MED SRVCS: CPT | Mod: 95

## 2022-09-23 NOTE — BH INPATIENT PSYCHIATRY PROGRESS NOTE - NSCGISEVERILLNESS_PSY_ALL_CORE
Letter sent to the patient regarding the blood work results.  The ALT was slightly elevated but all other testing was normal. 7 = Among the most extremely ill patients – pathology drastically interferes in many life functions; may be hospitalized

## 2022-10-04 ENCOUNTER — EMERGENCY (EMERGENCY)
Facility: HOSPITAL | Age: 72
LOS: 1 days | Discharge: HOME | End: 2022-10-04
Attending: EMERGENCY MEDICINE | Admitting: EMERGENCY MEDICINE

## 2022-10-04 VITALS
SYSTOLIC BLOOD PRESSURE: 160 MMHG | RESPIRATION RATE: 17 BRPM | DIASTOLIC BLOOD PRESSURE: 80 MMHG | TEMPERATURE: 97 F | HEART RATE: 68 BPM | HEIGHT: 67 IN | OXYGEN SATURATION: 100 %

## 2022-10-04 PROBLEM — G24.01 DRUG INDUCED SUBACUTE DYSKINESIA: Chronic | Status: ACTIVE | Noted: 2022-09-19

## 2022-10-04 LAB
ALBUMIN SERPL ELPH-MCNC: 4.3 G/DL — SIGNIFICANT CHANGE UP (ref 3.3–5)
ALP SERPL-CCNC: 85 U/L — SIGNIFICANT CHANGE UP (ref 40–120)
ALT FLD-CCNC: 27 U/L — SIGNIFICANT CHANGE UP (ref 4–33)
ANION GAP SERPL CALC-SCNC: 13 MMOL/L — SIGNIFICANT CHANGE UP (ref 7–14)
APPEARANCE UR: CLEAR — SIGNIFICANT CHANGE UP
AST SERPL-CCNC: 33 U/L — HIGH (ref 4–32)
BACTERIA # UR AUTO: ABNORMAL
BASE EXCESS BLDV CALC-SCNC: 3.8 MMOL/L — HIGH (ref -2–3)
BASOPHILS # BLD AUTO: 0.05 K/UL — SIGNIFICANT CHANGE UP (ref 0–0.2)
BASOPHILS NFR BLD AUTO: 1.2 % — SIGNIFICANT CHANGE UP (ref 0–2)
BILIRUB SERPL-MCNC: 0.3 MG/DL — SIGNIFICANT CHANGE UP (ref 0.2–1.2)
BILIRUB UR-MCNC: NEGATIVE — SIGNIFICANT CHANGE UP
BLOOD GAS VENOUS COMPREHENSIVE RESULT: SIGNIFICANT CHANGE UP
BUN SERPL-MCNC: 18 MG/DL — SIGNIFICANT CHANGE UP (ref 7–23)
CALCIUM SERPL-MCNC: 9.8 MG/DL — SIGNIFICANT CHANGE UP (ref 8.4–10.5)
CHLORIDE BLDV-SCNC: 106 MMOL/L — SIGNIFICANT CHANGE UP (ref 96–108)
CHLORIDE SERPL-SCNC: 106 MMOL/L — SIGNIFICANT CHANGE UP (ref 98–107)
CO2 BLDV-SCNC: 30.6 MMOL/L — HIGH (ref 22–26)
CO2 SERPL-SCNC: 23 MMOL/L — SIGNIFICANT CHANGE UP (ref 22–31)
COLOR SPEC: YELLOW — SIGNIFICANT CHANGE UP
CREAT SERPL-MCNC: 0.75 MG/DL — SIGNIFICANT CHANGE UP (ref 0.5–1.3)
DIFF PNL FLD: ABNORMAL
EGFR: 85 ML/MIN/1.73M2 — SIGNIFICANT CHANGE UP
EOSINOPHIL # BLD AUTO: 0.08 K/UL — SIGNIFICANT CHANGE UP (ref 0–0.5)
EOSINOPHIL NFR BLD AUTO: 1.9 % — SIGNIFICANT CHANGE UP (ref 0–6)
EPI CELLS # UR: 6 /HPF — HIGH (ref 0–5)
GAS PNL BLDV: 139 MMOL/L — SIGNIFICANT CHANGE UP (ref 136–145)
GAS PNL BLDV: SIGNIFICANT CHANGE UP
GLUCOSE BLDV-MCNC: 104 MG/DL — HIGH (ref 70–99)
GLUCOSE SERPL-MCNC: 107 MG/DL — HIGH (ref 70–99)
GLUCOSE UR QL: NEGATIVE — SIGNIFICANT CHANGE UP
HCO3 BLDV-SCNC: 29 MMOL/L — SIGNIFICANT CHANGE UP (ref 22–29)
HCT VFR BLD CALC: 40.2 % — SIGNIFICANT CHANGE UP (ref 34.5–45)
HCT VFR BLDA CALC: 38 % — SIGNIFICANT CHANGE UP (ref 34.5–46.5)
HGB BLD CALC-MCNC: 12.5 G/DL — SIGNIFICANT CHANGE UP (ref 11.5–15.5)
HGB BLD-MCNC: 12.4 G/DL — SIGNIFICANT CHANGE UP (ref 11.5–15.5)
HYALINE CASTS # UR AUTO: 0 /LPF — SIGNIFICANT CHANGE UP (ref 0–7)
IANC: 2 K/UL — SIGNIFICANT CHANGE UP (ref 1.8–7.4)
IMM GRANULOCYTES NFR BLD AUTO: 0.2 % — SIGNIFICANT CHANGE UP (ref 0–0.9)
KETONES UR-MCNC: NEGATIVE — SIGNIFICANT CHANGE UP
LACTATE BLDV-MCNC: 1.1 MMOL/L — SIGNIFICANT CHANGE UP (ref 0.5–2)
LEUKOCYTE ESTERASE UR-ACNC: ABNORMAL
LIDOCAIN IGE QN: 34 U/L — SIGNIFICANT CHANGE UP (ref 7–60)
LYMPHOCYTES # BLD AUTO: 1.7 K/UL — SIGNIFICANT CHANGE UP (ref 1–3.3)
LYMPHOCYTES # BLD AUTO: 39.8 % — SIGNIFICANT CHANGE UP (ref 13–44)
MCHC RBC-ENTMCNC: 28.4 PG — SIGNIFICANT CHANGE UP (ref 27–34)
MCHC RBC-ENTMCNC: 30.8 GM/DL — LOW (ref 32–36)
MCV RBC AUTO: 92 FL — SIGNIFICANT CHANGE UP (ref 80–100)
MONOCYTES # BLD AUTO: 0.43 K/UL — SIGNIFICANT CHANGE UP (ref 0–0.9)
MONOCYTES NFR BLD AUTO: 10.1 % — SIGNIFICANT CHANGE UP (ref 2–14)
NEUTROPHILS # BLD AUTO: 2 K/UL — SIGNIFICANT CHANGE UP (ref 1.8–7.4)
NEUTROPHILS NFR BLD AUTO: 46.8 % — SIGNIFICANT CHANGE UP (ref 43–77)
NITRITE UR-MCNC: NEGATIVE — SIGNIFICANT CHANGE UP
NRBC # BLD: 0 /100 WBCS — SIGNIFICANT CHANGE UP (ref 0–0)
NRBC # FLD: 0 K/UL — SIGNIFICANT CHANGE UP (ref 0–0)
OB PNL STL: NEGATIVE — SIGNIFICANT CHANGE UP
PCO2 BLDV: 46 MMHG — HIGH (ref 39–42)
PH BLDV: 7.41 — SIGNIFICANT CHANGE UP (ref 7.32–7.43)
PH UR: 6 — SIGNIFICANT CHANGE UP (ref 5–8)
PLATELET # BLD AUTO: 267 K/UL — SIGNIFICANT CHANGE UP (ref 150–400)
PO2 BLDV: 45 MMHG — SIGNIFICANT CHANGE UP
POTASSIUM BLDV-SCNC: 4.1 MMOL/L — SIGNIFICANT CHANGE UP (ref 3.5–5.1)
POTASSIUM SERPL-MCNC: 4.5 MMOL/L — SIGNIFICANT CHANGE UP (ref 3.5–5.3)
POTASSIUM SERPL-SCNC: 4.5 MMOL/L — SIGNIFICANT CHANGE UP (ref 3.5–5.3)
PROT SERPL-MCNC: 7.9 G/DL — SIGNIFICANT CHANGE UP (ref 6–8.3)
PROT UR-MCNC: ABNORMAL
RBC # BLD: 4.37 M/UL — SIGNIFICANT CHANGE UP (ref 3.8–5.2)
RBC # FLD: 14.1 % — SIGNIFICANT CHANGE UP (ref 10.3–14.5)
RBC CASTS # UR COMP ASSIST: 2 /HPF — SIGNIFICANT CHANGE UP (ref 0–4)
SAO2 % BLDV: 74.6 % — SIGNIFICANT CHANGE UP
SODIUM SERPL-SCNC: 142 MMOL/L — SIGNIFICANT CHANGE UP (ref 135–145)
SP GR SPEC: >1.05 (ref 1.01–1.05)
UROBILINOGEN FLD QL: SIGNIFICANT CHANGE UP
WBC # BLD: 4.27 K/UL — SIGNIFICANT CHANGE UP (ref 3.8–10.5)
WBC # FLD AUTO: 4.27 K/UL — SIGNIFICANT CHANGE UP (ref 3.8–10.5)
WBC UR QL: 16 /HPF — HIGH (ref 0–5)

## 2022-10-04 PROCEDURE — 74177 CT ABD & PELVIS W/CONTRAST: CPT | Mod: 26,MA

## 2022-10-04 PROCEDURE — 99285 EMERGENCY DEPT VISIT HI MDM: CPT

## 2022-10-04 RX ORDER — SODIUM CHLORIDE 9 MG/ML
1000 INJECTION, SOLUTION INTRAVENOUS ONCE
Refills: 0 | Status: COMPLETED | OUTPATIENT
Start: 2022-10-04 | End: 2022-10-04

## 2022-10-04 RX ADMIN — SODIUM CHLORIDE 1000 MILLILITER(S): 9 INJECTION, SOLUTION INTRAVENOUS at 19:02

## 2022-10-04 NOTE — ED PROVIDER NOTE - PROGRESS NOTE DETAILS
Resident Tonja Neal: Received sign out. Plan for CT scan, straight cath, and UA/UCx. d/c with miralax if CT is wnl.   Pt reassessed. Is well-appearing, walking around room. Resident Tonja Neal: CT reading is back. Shows large stool burden. Limited by motion artifact. Otherwise wnl. Urine collection pending Resident Tonja Neal: Attempted to call daughter Ama. No answer. Will call again in 10-15min. Pending d/c with pickup Resident Tonja Neal: Spoke with daughter Ama to discuss plan for pt. Miralax plus follow up with PCP, psychiatry, and gastroenterology this week. Resident Tonja Neal: Daughter spoke with nurse Kevin and said that she would  mother at 12:30am. Resident Tonja Neal: Daughter spoke with charge nurse and said that she would be here in half hour.

## 2022-10-04 NOTE — ED PROVIDER NOTE - PATIENT PORTAL LINK FT
You can access the FollowMyHealth Patient Portal offered by [facility] by registering at the following website: [email or web address]. By joining [employer]’s FollowMyHealth portal, you will also be able to view your health information using other applications (apps) compatible with our system.

## 2022-10-04 NOTE — ED PROVIDER NOTE - ATTENDING CONTRIBUTION TO CARE
Dr. Razo:  I have personally performed a face to face bedside history and physical examination of this patient. I have discussed the history, examination, review of systems, assessment and plan of management with the resident. I have reviewed the electronic medical record and amended it to reflect my history, review of systems, physical exam, assessment and plan.    72F h/o paranoid schizophrenia presents with 3 days of constipation (last BM 3 days prior).  Per daughter, patient was recently discharged 9/19 from [facility] with medication changes, and seems to have had constipation since.  Per daughter, patient also seems to have urinary hesitancy/frequency.  Pt denies any symptoms, but unreliable historian.      Exam:  - nad  - rrr  - ctab  - abd soft ntnd  - no fecal impaction on rectal exam as per resident    A/P  - constipation, possibly due to med changes, r/o other intra-abd pathology, eval UTI  - cbc, cmp, lipase, UA, urine culture, CT abd/pelvis

## 2022-10-04 NOTE — ED ADULT TRIAGE NOTE - HEIGHT IN CM
Your ct scan shows improvement of the bleeding in the brain.  Follow-up as originally planned.   You should see the concussion specialist for treatment,   
170.18

## 2022-10-04 NOTE — ED PROVIDER NOTE - NS ED ROS FT
ROS:  -Constitutional: Denies fever  -Head: Denies headache  -Eyes: Denies blurry vision  -Cardiovascular: Denies chest pain  -Pulmonary: Denies shortness of breath  -Gastrointestinal: Denies nausea, +diarrhea  -Genitourinary: +urinary retention   -Skin: Denies new rashes  -Neuro: Denies numbness or tingling

## 2022-10-04 NOTE — ED PROVIDER NOTE - PHYSICAL EXAMINATION
PHYSICAL EXAM:  CONSTITUTIONAL: Awake, oriented to person, place, time/situation and in no apparent distress.  HEAD: Atraumatic  EYES: Clear bilaterally, pupils equal, round and reactive to light.  ENMT: Airway patent, Nasal mucosa clear. Mouth with normal mucosa. Uvula is midline.   CARDIAC: Normal rate, regular rhythm. +S1/S2. No murmurs, rubs or gallops.  RESPIRATORY: Breathing unlabored. Breath sounds clear and equal bilaterally.  ABDOMEN:  Soft, nontender, nondistended. No rebound tenderness or guarding.  NEUROLOGICAL: No focal deficits, no motor or sensory deficits. CN2-12 intact. Sensation intact x4 extremities.  SKIN: Skin warm and dry. No evidence of rashes or lesions.
[id number]

## 2022-10-04 NOTE — ED ADULT NURSE NOTE - OBJECTIVE STATEMENT
Patient to the ED with c/o of abdominal discomfort. The patient caretaker verbalized the patient has not had a bowel movement in 3 days and has had difficulty urinating. The patient has a hx of paranoid schizophrenia and take Haldol IM to help with symptoms. All labs drawn and sent. Pending Ct scan and labs work for further care planning.

## 2022-10-04 NOTE — ED PROVIDER NOTE - CLINICAL SUMMARY MEDICAL DECISION MAKING FREE TEXT BOX
72F presenting with 3 day constipation. Pt is poor historian, accompanied by daughter/caretaker. Caretaker notes this is the third such multi-day episode of constipation since dc from [facility] where a med adjustment was made. Pt on 1st gen antipsychotic with constpiation as a potential adverse. This could potentially be a med side effect, but must eval for acute abdominal pathology given age and poor historian. Will CTAP, ua, lipase, cmp cbc, ucx.

## 2022-10-04 NOTE — ED PROVIDER NOTE - OBJECTIVE STATEMENT
72F presenting with 3 days of constipation. Pt is a poor historian, has a hx of paranoid schizophrenia, was recently discharged from [facility] on 9/19. During this admission she was put on long term IM haloperidol (150mg IM qmonth) and also 1mg Benztropine po bid. Patient is accompanied by daughter and caretaker with whom she lives. Daughter states that patient has had issues with constipation since discharge on 9/19. This most recent episode began saturday- patient states that she had one nonbloody liquid bowel movement on saturday and has not since (3 days). Denies any bleeding, vomiting, abdominal pain at all, fevers, weight change.

## 2022-10-06 PROBLEM — Z86.59 PERSONAL HISTORY OF OTHER MENTAL AND BEHAVIORAL DISORDERS: Chronic | Status: ACTIVE | Noted: 2022-10-04

## 2022-10-06 LAB
CULTURE RESULTS: SIGNIFICANT CHANGE UP
SPECIMEN SOURCE: SIGNIFICANT CHANGE UP

## 2022-10-18 DIAGNOSIS — F20.0 PARANOID SCHIZOPHRENIA: ICD-10-CM

## 2022-10-18 DIAGNOSIS — G24.01 DRUG INDUCED SUBACUTE DYSKINESIA: ICD-10-CM

## 2022-11-01 NOTE — SOCIAL WORK POST DISCHARGE FOLLOW UP NOTE - NSBHSWFOLLOWUP_PSY_ALL_CORE_FT
call to unit from male who identified as son. he wanted to know 'what did you do to my mother' and wanted to speak to MD.  unit receptionist redirected him to speak to patient's daughter- his sister for psy aftercare. UR asked sw to intervene,  brother explained pt unable to see and walk .  Sw expressed concern for pt/ his mother but explained his mother was discharged walking better  than at admission and his sister had commented and thanked team. pt was able to see. Sw suggested  they interface with aftercare referral. he reported pt needed to be rehospitalized.  as compared to speaking to MD,  sw suggested he might ask his sister to call medical records for MD notes and or MD dc summary, or he might call and see if there is a release in place for him. all documentation could be provided to a new provider.   provided phone number to medical records.

## 2022-11-04 ENCOUNTER — EMERGENCY (EMERGENCY)
Facility: HOSPITAL | Age: 72
LOS: 1 days | Discharge: ANOTHER HOSPITAL | End: 2022-11-04
Attending: EMERGENCY MEDICINE | Admitting: EMERGENCY MEDICINE

## 2022-11-04 ENCOUNTER — INPATIENT (INPATIENT)
Facility: HOSPITAL | Age: 72
LOS: 13 days | Discharge: NURSING, REHAB OR LONG-TERM CARE | DRG: 564 | End: 2022-11-18
Attending: STUDENT IN AN ORGANIZED HEALTH CARE EDUCATION/TRAINING PROGRAM | Admitting: STUDENT IN AN ORGANIZED HEALTH CARE EDUCATION/TRAINING PROGRAM
Payer: MEDICARE

## 2022-11-04 VITALS
HEIGHT: 67 IN | RESPIRATION RATE: 17 BRPM | DIASTOLIC BLOOD PRESSURE: 88 MMHG | HEART RATE: 78 BPM | SYSTOLIC BLOOD PRESSURE: 104 MMHG | TEMPERATURE: 98 F | OXYGEN SATURATION: 100 %

## 2022-11-04 VITALS
DIASTOLIC BLOOD PRESSURE: 69 MMHG | HEART RATE: 59 BPM | OXYGEN SATURATION: 95 % | SYSTOLIC BLOOD PRESSURE: 146 MMHG | RESPIRATION RATE: 17 BRPM | TEMPERATURE: 98 F

## 2022-11-04 VITALS
RESPIRATION RATE: 18 BRPM | OXYGEN SATURATION: 100 % | DIASTOLIC BLOOD PRESSURE: 97 MMHG | SYSTOLIC BLOOD PRESSURE: 162 MMHG | TEMPERATURE: 98 F | HEART RATE: 57 BPM

## 2022-11-04 LAB
ALBUMIN SERPL ELPH-MCNC: 3.5 G/DL — SIGNIFICANT CHANGE UP (ref 3.3–5)
ALP SERPL-CCNC: 79 U/L — SIGNIFICANT CHANGE UP (ref 40–120)
ALT FLD-CCNC: 18 U/L — SIGNIFICANT CHANGE UP (ref 4–33)
ANION GAP SERPL CALC-SCNC: 10 MMOL/L — SIGNIFICANT CHANGE UP (ref 7–14)
AST SERPL-CCNC: 32 U/L — SIGNIFICANT CHANGE UP (ref 4–32)
BASOPHILS # BLD AUTO: 0.04 K/UL — SIGNIFICANT CHANGE UP (ref 0–0.2)
BASOPHILS NFR BLD AUTO: 0.9 % — SIGNIFICANT CHANGE UP (ref 0–2)
BILIRUB SERPL-MCNC: 0.5 MG/DL — SIGNIFICANT CHANGE UP (ref 0.2–1.2)
BLD GP AB SCN SERPL QL: NEGATIVE — SIGNIFICANT CHANGE UP
BUN SERPL-MCNC: 13 MG/DL — SIGNIFICANT CHANGE UP (ref 7–23)
CALCIUM SERPL-MCNC: 9.2 MG/DL — SIGNIFICANT CHANGE UP (ref 8.4–10.5)
CHLORIDE SERPL-SCNC: 104 MMOL/L — SIGNIFICANT CHANGE UP (ref 98–107)
CO2 SERPL-SCNC: 22 MMOL/L — SIGNIFICANT CHANGE UP (ref 22–31)
CREAT SERPL-MCNC: 0.67 MG/DL — SIGNIFICANT CHANGE UP (ref 0.5–1.3)
EGFR: 93 ML/MIN/1.73M2 — SIGNIFICANT CHANGE UP
EOSINOPHIL # BLD AUTO: 0.11 K/UL — SIGNIFICANT CHANGE UP (ref 0–0.5)
EOSINOPHIL NFR BLD AUTO: 2.4 % — SIGNIFICANT CHANGE UP (ref 0–6)
GLUCOSE SERPL-MCNC: 88 MG/DL — SIGNIFICANT CHANGE UP (ref 70–99)
HCT VFR BLD CALC: 35.6 % — SIGNIFICANT CHANGE UP (ref 34.5–45)
HGB BLD-MCNC: 11.2 G/DL — LOW (ref 11.5–15.5)
IANC: 2.48 K/UL — SIGNIFICANT CHANGE UP (ref 1.8–7.4)
IMM GRANULOCYTES NFR BLD AUTO: 0.4 % — SIGNIFICANT CHANGE UP (ref 0–0.9)
LYMPHOCYTES # BLD AUTO: 1.34 K/UL — SIGNIFICANT CHANGE UP (ref 1–3.3)
LYMPHOCYTES # BLD AUTO: 29.7 % — SIGNIFICANT CHANGE UP (ref 13–44)
MCHC RBC-ENTMCNC: 28.9 PG — SIGNIFICANT CHANGE UP (ref 27–34)
MCHC RBC-ENTMCNC: 31.5 GM/DL — LOW (ref 32–36)
MCV RBC AUTO: 91.8 FL — SIGNIFICANT CHANGE UP (ref 80–100)
MONOCYTES # BLD AUTO: 0.52 K/UL — SIGNIFICANT CHANGE UP (ref 0–0.9)
MONOCYTES NFR BLD AUTO: 11.5 % — SIGNIFICANT CHANGE UP (ref 2–14)
NEUTROPHILS # BLD AUTO: 2.48 K/UL — SIGNIFICANT CHANGE UP (ref 1.8–7.4)
NEUTROPHILS NFR BLD AUTO: 55.1 % — SIGNIFICANT CHANGE UP (ref 43–77)
NRBC # BLD: 0 /100 WBCS — SIGNIFICANT CHANGE UP (ref 0–0)
NRBC # FLD: 0.04 K/UL — HIGH (ref 0–0)
PLATELET # BLD AUTO: 205 K/UL — SIGNIFICANT CHANGE UP (ref 150–400)
POTASSIUM SERPL-MCNC: 4.4 MMOL/L — SIGNIFICANT CHANGE UP (ref 3.5–5.3)
POTASSIUM SERPL-SCNC: 4.4 MMOL/L — SIGNIFICANT CHANGE UP (ref 3.5–5.3)
PROT SERPL-MCNC: 6.8 G/DL — SIGNIFICANT CHANGE UP (ref 6–8.3)
RBC # BLD: 3.88 M/UL — SIGNIFICANT CHANGE UP (ref 3.8–5.2)
RBC # FLD: 14.7 % — HIGH (ref 10.3–14.5)
RH IG SCN BLD-IMP: POSITIVE — SIGNIFICANT CHANGE UP
SARS-COV-2 RNA SPEC QL NAA+PROBE: SIGNIFICANT CHANGE UP
SODIUM SERPL-SCNC: 136 MMOL/L — SIGNIFICANT CHANGE UP (ref 135–145)
TROPONIN T, HIGH SENSITIVITY RESULT: 9 NG/L — SIGNIFICANT CHANGE UP
WBC # BLD: 4.51 K/UL — SIGNIFICANT CHANGE UP (ref 3.8–10.5)
WBC # FLD AUTO: 4.51 K/UL — SIGNIFICANT CHANGE UP (ref 3.8–10.5)

## 2022-11-04 PROCEDURE — 93010 ELECTROCARDIOGRAM REPORT: CPT

## 2022-11-04 PROCEDURE — 70450 CT HEAD/BRAIN W/O DYE: CPT | Mod: 26,MD

## 2022-11-04 PROCEDURE — 99285 EMERGENCY DEPT VISIT HI MDM: CPT

## 2022-11-04 PROCEDURE — 71045 X-RAY EXAM CHEST 1 VIEW: CPT | Mod: 26

## 2022-11-04 PROCEDURE — 71250 CT THORAX DX C-: CPT | Mod: 26,MA

## 2022-11-04 RX ORDER — ACETAMINOPHEN 500 MG
1000 TABLET ORAL ONCE
Refills: 0 | Status: COMPLETED | OUTPATIENT
Start: 2022-11-04 | End: 2022-11-04

## 2022-11-04 RX ORDER — ACETAMINOPHEN 500 MG
975 TABLET ORAL ONCE
Refills: 0 | Status: COMPLETED | OUTPATIENT
Start: 2022-11-04 | End: 2022-11-04

## 2022-11-04 RX ADMIN — Medication 975 MILLIGRAM(S): at 16:27

## 2022-11-04 NOTE — ED PROVIDER NOTE - OBJECTIVE STATEMENT
73 yo F, cognitive deficit, Vit D def, pre-diabetes, presenting following fall. Has been having chest pain since that time, gradually increasing. Positional, worst when she bends forward. No associated fevers, chills, cough, hemoptysis, sob. No other active sxs. NKDA. Has been having 1 month of disequilibrium since starting new psych medications, has been tapering off per daughter, instability 2/2 this, causing significant concern by the daughter. 71 yo F, cognitive deficit, Vit D def, pre-diabetes, presenting following fall. Has been having chest pain since that time, gradually increasing. Positional, worst when she bends forward. No associated fevers, chills, cough, hemoptysis, sob. No other active sxs. NKDA. Has been having 1 month of disequilibrium since starting new psych medications, has been tapering off per daughter, instability 2/2 this, causing significant concern by the daughter.    Patient evaluated at [facility] ED and found to have sternal fracture seen on CT chest. Pt was transferred to this ED for further eval. Patient currently complaining of chest pain particularly with palpation of the breathing, otherwise denies any other symptoms such as headache, extremity pain, confusion.

## 2022-11-04 NOTE — ED ADULT NURSE NOTE - OBJECTIVE STATEMENT
Pt received awake, alert, A+O x 4. Pt was transferred from [facility] s/p fall 2 days ago and sustained sternal Fx. Pt reports she fell down 8 stairs, denies LOC. Pt seen ambulating with steady gait.   Pt's daughter, Ama was called at [phone]. Pt's daughter confirmed pt's report of falling. As per pt's daughter, Ama, pt has had periods of confusion since 1 month ago after taking a new medication that she cannot recall the name of.   Pt was given Tylenol at [facility], pt reports mild pain when bending forward. She is able to make needs known.

## 2022-11-04 NOTE — ED ADULT NURSE NOTE - NSIMPLEMENTINTERV_GEN_ALL_ED
Implemented All Fall with Harm Risk Interventions:  [place] to call system. Call bell, personal items and telephone within reach. Instruct patient to call for assistance. Room bathroom lighting operational. Non-slip footwear when patient is off stretcher. Physically safe environment: no spills, clutter or unnecessary equipment. Stretcher in lowest position, wheels locked, appropriate side rails in place. Provide visual cue, wrist band, yellow gown, etc. Monitor gait and stability. Monitor for mental status changes and reorient to person, place, and time. Review medications for side effects contributing to fall risk. Reinforce activity limits and safety measures with patient and family. Provide visual clues: red socks.

## 2022-11-04 NOTE — ED PROVIDER NOTE - CLINICAL SUMMARY MEDICAL DECISION MAKING FREE TEXT BOX
72-year-old female with PMHx of schizophrenia with resultant Tardive dyskinesia presenting after fall.  Patient denies dizziness prior to fall states that she lost her footing.  Given history of TD likely mechanical fall versus syncope.  Patient denies hitting her head but does endorse chest wall pain after the fall.  Daughter does note that has some SOB/MERRITT.  Presentation concerning for rib fracture vs chest wall contusion vs pneumothorax.  Will order EKG, CXR and CTH, CT chest noncon. Will give APAP for pain.

## 2022-11-04 NOTE — ED PROVIDER NOTE - OBJECTIVE STATEMENT
This is a 72-year-old female with PMHx of schizophrenia with resultant Tardive dyskinesia presenting after fall.  Patient is a challenging historian, daughter at bedside provides part of history though patient disputes certain aspects.  Fall occurred 2 days ago while walking down the stairs.  Patient states that she "lost her footing".  She denies feeling dizzy before or after the fall. Length of stairs is 20 steps though unclear how far down she fell.  Patient endorses hitting her chest.  Daughter also notes patient appears to have SOB/MERRITT.  Denies hitting her head or LOC.  Notably she has been on haloperidol 150 every month.  Was placed on valbenazine for tardive dyskinesia.  Patient otherwise denies dizziness, fever, headache, vision changes, abdominal pain, nausea, vomiting, urinary symptoms, diarrhea, constipation, LE swelling/pain. This is a 72-year-old female with PMHx of schizophrenia with resultant Tardive dyskinesia presenting after fall.  Patient is a challenging historian, daughter at bedside provides part of history though patient disputes certain aspects.  Fall occurred 2 days ago while walking down the stairs.  Patient states that she "lost her footing".  She denies feeling dizzy before or after the fall. Length of stairs is 20 steps though unclear how far down she fell.  Patient endorses hitting her chest.  Daughter also notes patient appears to have SOB/MERRITT.  Denies hitting her head or LOC.  Notably she has been on haloperidol 150 every month.  Was placed on valbenazine for tardive dyskinesia.  Patient otherwise denies dizziness, fever, headache, vision changes, abdominal pain, nausea, vomiting, urinary symptoms, diarrhea, constipation, LE swelling/pain.  Attending - Agree with above.  I evaluated patient myself. 73 y/o F reports she tripped on steps and hit her chest when she fell.  Denies head trauma or LOC.  No neck pain.  c/o chest pain from hitting chest.  Denies preceding chest pain or lightheadedness.  Denies headache, blurry vision, n/v.  Denies extremity weakness or numbness.

## 2022-11-04 NOTE — ED PROVIDER NOTE - CLINICAL SUMMARY MEDICAL DECISION MAKING FREE TEXT BOX
73 yo F, cognitive deficit, Vit D def, pre-diabetes, presenting following fall. primary survey clear. no e/o head trauma, no reported head trauma, no headache, NEXUS criteria negative for C-spine injury. low pre-test suspicion for CNS associated bony fracture, assess for occult ich, ct head. marked chest wall tenderness anteriorly, NEXUS chest rule applied, cannot rule out significant internal injury, ct chest iv contrast. assess for e/o myocardial contusion, acs, though pain is likely msk given reproducibility on exam and positional. no external e/o abdominal trauma, no tenderness, low suspicion for major intra-abdominal traumatic injury. ambulating and moving extremities w/out difficulty, no bony tenderness over the extremities, low concern for extremity/pelvic fracture. spoke w/ daughter, pt w/ chronic disequilibrium ongoing for x1 month, likely polypharmacy and now w/ acute fall, admission for rehabilitation, Rx reconciliation. 71 yo F, cognitive deficit, Vit D def, pre-diabetes, presenting following fall. primary survey clear. no e/o head trauma, no reported head trauma, no headache, NEXUS criteria negative for C-spine injury. low pre-test suspicion for CNS associated bony fracture, assess for occult ich, ct head. marked chest wall tenderness anteriorly, NEXUS chest rule applied, cannot rule out significant internal injury, ct chest iv contrast. assess for e/o myocardial contusion, acs, though pain is likely msk given reproducibility on exam and positional. no external e/o abdominal trauma, no tenderness, low suspicion for major intra-abdominal traumatic injury. ambulating and moving extremities w/out difficulty, no bony tenderness over the extremities, low concern for extremity/pelvic fracture. spoke w/ daughter, pt w/ chronic disequilibrium ongoing for x1 month, likely polypharmacy and now w/ acute fall, admission for rehabilitation, Rx reconciliation.    Dr. Guo's Note: Patient has already been worked up for traumatic injuries at outside hospital and was found to have sternal fracture for which she is here to be seen by trauma surgery.  Expectation is for trauma to evaluate patient and admit for further care.

## 2022-11-04 NOTE — ED PROVIDER NOTE - NS ED ROS FT
General: denies fever, chills  HENT: denies nasal congestion, rhinorrhea  Eyes: denies visual changes, blurred vision  CV: CP, denies palpitations  Resp: SOB/MERRITT, denies cough  Abdominal: denies nausea, vomiting, diarrhea, abdominal pain  : denies urinary pain or discharge  MSK: denies muscle aches, leg swelling  Neuro: denies headaches, numbness, tingling  Skin: denies rashes, bruises

## 2022-11-04 NOTE — ED PROVIDER NOTE - CARE PLAN
1 Principal Discharge DX:	Sternal fracture  Secondary Diagnosis:	Fracture of multiple ribs of right side  Secondary Diagnosis:	Disequilibrium

## 2022-11-04 NOTE — ED PROVIDER NOTE - PROGRESS NOTE DETAILS
Edmond Gallego I spoke to patient's daughter Ama Vargas.  Told the patient will be transferred to [facility] for trauma evaluation. Toya Mancera MD (PGY-1 EM): explained to patient CT results and need for transfer.

## 2022-11-04 NOTE — ED ADULT TRIAGE NOTE - CHIEF COMPLAINT QUOTE
Patient s/p fall down steps. c/o chest pain. Patient unable to sit in triage. Patient being treated for tardive dyskinesia s/t psych medication use; medication ingrezza. Denies hitting her head. Denies LOC at this time. Daughter with patient, states this is baseline mental status.

## 2022-11-04 NOTE — ED ADULT NURSE NOTE - OBJECTIVE STATEMENT
Pt received s/p fall x 2 days ago, c/o chest discomfort ever since fall. Pt denies LOC, denies head-strike, denies blood thinner use. Alert and oriented x 3, unsteady gait. Pt currently being treated for tardive dyskinesia caused by psych meds. Pt is unable to provide complete history at this time. Pt calm and cooperative, sitting up in stretcher, Awaiting Ct. scan. Safety maintained. Awaiting further orders.

## 2022-11-04 NOTE — ED PROVIDER NOTE - ATTENDING CONTRIBUTION TO CARE
Patient arrives as a transfer from [facility] due to sternal fracture seen on CT chest.  Patient suffered a mechanical fall a few days ago and has been endorsing chest pain.  She denies any other pain in her body and denies direct head trauma.  Review of work-up at [facility] shows negative head imaging, unremarkable labs, and CT chest showing the sternal fracture along with 2 rib fractures.  Patient is hemodynamically stable here, no acute distress, no other signs of trauma outside of sternum on exam.  Patient will get evaluated and admitted by trauma surgery for further care of her injuries.

## 2022-11-04 NOTE — ED PROVIDER NOTE - PHYSICAL EXAMINATION
GENERAL: well appearing in no acute distress, non-toxic appearing  HEAD: normocephalic, atraumatic  HEENT: normal conjunctiva, oral mucosa moist, uvula midline, no tonsilar exudates, neck supple, no JVD  CARDIAC: CHEST WALL TENDERNESS, regular rate and rhythm, normal S1S2, no appreciable murmurs, 2+ pulses in UE/LE b/l  PULM: normal breath sounds, clear to ascultation bilaterally, no rales, rhonchi, wheezing  GI: abdomen nondistended, soft, nontender, no guarding, rebound tenderness  : no CVA tenderness b/l, no suprapubic tenderness  NEURO: DYSKINESIA, CN2-12 intact, normal speech, PERRLA, EOMI, normal gait, AAOx3  MSK: no peripheral edema, no calf tenderness b/l  SKIN: well-perfused, extremities warm, no visible rashes  PSYCH: appropriate mood and affect GENERAL: well appearing in no acute distress, non-toxic appearing  HEAD: normocephalic, atraumatic  HEENT: normal conjunctiva, oral mucosa moist, uvula midline, no tonsilar exudates, neck supple, no JVD  CARDIAC: CHEST WALL TENDERNESS, regular rate and rhythm, normal S1S2, no appreciable murmurs, 2+ pulses in UE/LE b/l  PULM: normal breath sounds, clear to ascultation bilaterally, no rales, rhonchi, wheezing  GI: abdomen nondistended, soft, nontender, no guarding, rebound tenderness  : no CVA tenderness b/l, no suprapubic tenderness  NEURO: DYSKINESIA, CN2-12 intact, normal speech, PERRLA, EOMI, normal gait, AAOx3  MSK: no peripheral edema, no calf tenderness b/l  SKIN: well-perfused, extremities warm, no visible rashes  PSYCH: appropriate mood and affect  ATTENDING PHYSICAL EXAM  GEN - NAD; Answers questions, RR 12reg.  O2 100%RA, well appearing; + tardive dyskinesia movements  HEAD - NC/AT; EYES/NOSE - PERRL, EOMI, mucous membranes moist, no discharge; THROAT: Oral cavity and pharynx normal. No inflammation, swelling, exudate, or lesions  NECK: Neck supple, non-tender without lymphadenopathy, no masses, no JVD  PULMONARY - Decreased BSs left base compared to right base.  CARDIAC -mild tenderness of sternum and mid chest, no bony step-off appreciated, s1s2, RRR, no M,R,G  ABDOMEN - +NABS, ND, NT, soft, no guarding, no rebound, no masses   BACK - no CVA tenderness, No vertebral or paravertebral tenderness  EXTREMITIES - symmetric pulses, 2+ dp, capillary refill < 2 seconds, no clubbing, no cyanosis, no edema  SKIN - no rash or bruising   NEUROLOGIC - alert, + TD movements, CN 2-12 intact, sensation nl, coordination nl, motor 5/5 RUE/LUE/RLE/LLE/EHL/Plantar flexion, no pronator drift, gait nl

## 2022-11-04 NOTE — ED ADULT NURSE REASSESSMENT NOTE - NS ED NURSE REASSESS COMMENT FT1
Patient is alert and responsive to all stimuli. General condition stable. No acute distress noted. Breathing with ease. Transferred to [facility]. Left unit via stretcher accompanied by two EMT attendants from [facility].

## 2022-11-04 NOTE — ED PROVIDER NOTE - NS ED ROS FT
GENERAL: no fever  EYES: no eye pain  HEENT: no neck pain  CARDIAC: + chest pain  PULMONARY: no SOB  GI: no abdominal pain  : no dysuria  SKIN: no rashes  NEURO: + disequilibrium  MSK: no new joint pain

## 2022-11-04 NOTE — ED PROVIDER NOTE - PHYSICAL EXAMINATION
General: NAD. Patient converses w/ the examiner normally.   Head: NCAT. No wounds, hematomas or active bleeding over the scalp.  Eyes: normal appearing conjunctiva  HENT: Negative for Raccoon eyes or Beck's signs. Ears visually unremarkable. No nasal septal deviation or nasal septal hematoma. Oral cavity and OP are clear.  Chest: Chest wall is visually unremarkable. No clavicular or chest wall tenderness. Heart sounds = normal rate and rhythm w/out M/R/G.   Abdomen: Visually unremarkable. No tenderness or guarding with palpation.  : Visually unremarkable genitalia. Normal appearing sacrum, anal hemorrhoid, otherwise unremarkable anus. Thomas Lentz RN.    MSK: The pelvis is stable w/ AP stressing. No gross deformity of the extremities. No wounds to the extremities. Full ROM x4 extremities. No point tenderness x4 extremities.    Neurologic: Alert. Responds to few words, oriented x2. Intact sensation to light touch in all 4 extremities. Normal motor function of x4 extremities.  Spine: No midline tenderness. No kirsty deformity of the spine.

## 2022-11-05 DIAGNOSIS — Z29.9 ENCOUNTER FOR PROPHYLACTIC MEASURES, UNSPECIFIED: ICD-10-CM

## 2022-11-05 DIAGNOSIS — R42 DIZZINESS AND GIDDINESS: ICD-10-CM

## 2022-11-05 DIAGNOSIS — G24.01 DRUG INDUCED SUBACUTE DYSKINESIA: ICD-10-CM

## 2022-11-05 DIAGNOSIS — S22.20XA UNSPECIFIED FRACTURE OF STERNUM, INITIAL ENCOUNTER FOR CLOSED FRACTURE: ICD-10-CM

## 2022-11-05 DIAGNOSIS — E55.9 VITAMIN D DEFICIENCY, UNSPECIFIED: ICD-10-CM

## 2022-11-05 DIAGNOSIS — F20.0 PARANOID SCHIZOPHRENIA: ICD-10-CM

## 2022-11-05 LAB
ALBUMIN SERPL ELPH-MCNC: 3.6 G/DL — SIGNIFICANT CHANGE UP (ref 3.3–5)
ALP SERPL-CCNC: 83 U/L — SIGNIFICANT CHANGE UP (ref 40–120)
ALT FLD-CCNC: 20 U/L — SIGNIFICANT CHANGE UP (ref 10–45)
ANION GAP SERPL CALC-SCNC: 9 MMOL/L — SIGNIFICANT CHANGE UP (ref 5–17)
APTT BLD: 22.5 SEC — LOW (ref 27.5–35.5)
AST SERPL-CCNC: 38 U/L — SIGNIFICANT CHANGE UP (ref 10–40)
BASE EXCESS BLDV CALC-SCNC: 3.2 MMOL/L — HIGH (ref -2–3)
BASOPHILS # BLD AUTO: 0.02 K/UL — SIGNIFICANT CHANGE UP (ref 0–0.2)
BASOPHILS NFR BLD AUTO: 0.5 % — SIGNIFICANT CHANGE UP (ref 0–2)
BILIRUB SERPL-MCNC: 0.6 MG/DL — SIGNIFICANT CHANGE UP (ref 0.2–1.2)
BLD GP AB SCN SERPL QL: NEGATIVE — SIGNIFICANT CHANGE UP
BUN SERPL-MCNC: 12 MG/DL — SIGNIFICANT CHANGE UP (ref 7–23)
CA-I SERPL-SCNC: 1.25 MMOL/L — SIGNIFICANT CHANGE UP (ref 1.15–1.33)
CALCIUM SERPL-MCNC: 8.9 MG/DL — SIGNIFICANT CHANGE UP (ref 8.4–10.5)
CHLORIDE BLDV-SCNC: 103 MMOL/L — SIGNIFICANT CHANGE UP (ref 96–108)
CHLORIDE SERPL-SCNC: 102 MMOL/L — SIGNIFICANT CHANGE UP (ref 96–108)
CO2 BLDV-SCNC: 32 MMOL/L — HIGH (ref 22–26)
CO2 SERPL-SCNC: 25 MMOL/L — SIGNIFICANT CHANGE UP (ref 22–31)
CREAT SERPL-MCNC: 0.63 MG/DL — SIGNIFICANT CHANGE UP (ref 0.5–1.3)
EGFR: 95 ML/MIN/1.73M2 — SIGNIFICANT CHANGE UP
EOSINOPHIL # BLD AUTO: 0.08 K/UL — SIGNIFICANT CHANGE UP (ref 0–0.5)
EOSINOPHIL NFR BLD AUTO: 2.1 % — SIGNIFICANT CHANGE UP (ref 0–6)
FLUAV AG NPH QL: SIGNIFICANT CHANGE UP
FLUBV AG NPH QL: SIGNIFICANT CHANGE UP
GAS PNL BLDV: 137 MMOL/L — SIGNIFICANT CHANGE UP (ref 136–145)
GAS PNL BLDV: SIGNIFICANT CHANGE UP
GAS PNL BLDV: SIGNIFICANT CHANGE UP
GLUCOSE BLDV-MCNC: 88 MG/DL — SIGNIFICANT CHANGE UP (ref 70–99)
GLUCOSE SERPL-MCNC: 88 MG/DL — SIGNIFICANT CHANGE UP (ref 70–99)
HCO3 BLDV-SCNC: 30 MMOL/L — HIGH (ref 22–29)
HCT VFR BLD CALC: 37 % — SIGNIFICANT CHANGE UP (ref 34.5–45)
HCT VFR BLDA CALC: 35 % — SIGNIFICANT CHANGE UP (ref 34.5–46.5)
HGB BLD CALC-MCNC: 11.7 G/DL — SIGNIFICANT CHANGE UP (ref 11.7–16.1)
HGB BLD-MCNC: 11.3 G/DL — LOW (ref 11.5–15.5)
HOROWITZ INDEX BLDV+IHG-RTO: SIGNIFICANT CHANGE UP
IMM GRANULOCYTES NFR BLD AUTO: 0.3 % — SIGNIFICANT CHANGE UP (ref 0–0.9)
INR BLD: 1.01 RATIO — SIGNIFICANT CHANGE UP (ref 0.88–1.16)
LACTATE BLDV-MCNC: 1.4 MMOL/L — SIGNIFICANT CHANGE UP (ref 0.5–2)
LIDOCAIN IGE QN: 21 U/L — SIGNIFICANT CHANGE UP (ref 7–60)
LYMPHOCYTES # BLD AUTO: 1.09 K/UL — SIGNIFICANT CHANGE UP (ref 1–3.3)
LYMPHOCYTES # BLD AUTO: 28 % — SIGNIFICANT CHANGE UP (ref 13–44)
MAGNESIUM SERPL-MCNC: 2 MG/DL — SIGNIFICANT CHANGE UP (ref 1.6–2.6)
MCHC RBC-ENTMCNC: 28.7 PG — SIGNIFICANT CHANGE UP (ref 27–34)
MCHC RBC-ENTMCNC: 30.5 GM/DL — LOW (ref 32–36)
MCV RBC AUTO: 93.9 FL — SIGNIFICANT CHANGE UP (ref 80–100)
MONOCYTES # BLD AUTO: 0.34 K/UL — SIGNIFICANT CHANGE UP (ref 0–0.9)
MONOCYTES NFR BLD AUTO: 8.7 % — SIGNIFICANT CHANGE UP (ref 2–14)
NEUTROPHILS # BLD AUTO: 2.35 K/UL — SIGNIFICANT CHANGE UP (ref 1.8–7.4)
NEUTROPHILS NFR BLD AUTO: 60.4 % — SIGNIFICANT CHANGE UP (ref 43–77)
NRBC # BLD: 0 /100 WBCS — SIGNIFICANT CHANGE UP (ref 0–0)
PCO2 BLDV: 54 MMHG — HIGH (ref 39–42)
PH BLDV: 7.35 — SIGNIFICANT CHANGE UP (ref 7.32–7.43)
PLATELET # BLD AUTO: 219 K/UL — SIGNIFICANT CHANGE UP (ref 150–400)
PO2 BLDV: 25 MMHG — SIGNIFICANT CHANGE UP (ref 25–45)
POTASSIUM BLDV-SCNC: 3.4 MMOL/L — LOW (ref 3.5–5.1)
POTASSIUM SERPL-MCNC: 5.3 MMOL/L — SIGNIFICANT CHANGE UP (ref 3.5–5.3)
POTASSIUM SERPL-SCNC: 5.3 MMOL/L — SIGNIFICANT CHANGE UP (ref 3.5–5.3)
PROT SERPL-MCNC: 7.1 G/DL — SIGNIFICANT CHANGE UP (ref 6–8.3)
PROTHROM AB SERPL-ACNC: 11.6 SEC — SIGNIFICANT CHANGE UP (ref 10.5–13.4)
RBC # BLD: 3.94 M/UL — SIGNIFICANT CHANGE UP (ref 3.8–5.2)
RBC # FLD: 14.6 % — HIGH (ref 10.3–14.5)
RH IG SCN BLD-IMP: POSITIVE — SIGNIFICANT CHANGE UP
RSV RNA NPH QL NAA+NON-PROBE: SIGNIFICANT CHANGE UP
SAO2 % BLDV: 45 % — LOW (ref 67–88)
SARS-COV-2 RNA SPEC QL NAA+PROBE: SIGNIFICANT CHANGE UP
SODIUM SERPL-SCNC: 136 MMOL/L — SIGNIFICANT CHANGE UP (ref 135–145)
TROPONIN T, HIGH SENSITIVITY RESULT: 9 NG/L — SIGNIFICANT CHANGE UP (ref 0–51)
WBC # BLD: 3.89 K/UL — SIGNIFICANT CHANGE UP (ref 3.8–10.5)
WBC # FLD AUTO: 3.89 K/UL — SIGNIFICANT CHANGE UP (ref 3.8–10.5)

## 2022-11-05 PROCEDURE — 71045 X-RAY EXAM CHEST 1 VIEW: CPT | Mod: 26

## 2022-11-05 PROCEDURE — 99223 1ST HOSP IP/OBS HIGH 75: CPT

## 2022-11-05 RX ORDER — CHOLECALCIFEROL (VITAMIN D3) 125 MCG
1000 CAPSULE ORAL DAILY
Refills: 0 | Status: DISCONTINUED | OUTPATIENT
Start: 2022-11-05 | End: 2022-11-18

## 2022-11-05 RX ORDER — LANOLIN ALCOHOL/MO/W.PET/CERES
3 CREAM (GRAM) TOPICAL AT BEDTIME
Refills: 0 | Status: DISCONTINUED | OUTPATIENT
Start: 2022-11-05 | End: 2022-11-18

## 2022-11-05 RX ORDER — ACETAMINOPHEN 500 MG
650 TABLET ORAL EVERY 6 HOURS
Refills: 0 | Status: DISCONTINUED | OUTPATIENT
Start: 2022-11-05 | End: 2022-11-18

## 2022-11-05 RX ADMIN — Medication 1000 MILLIGRAM(S): at 01:00

## 2022-11-05 RX ADMIN — Medication 400 MILLIGRAM(S): at 00:41

## 2022-11-05 RX ADMIN — Medication 1000 MILLIGRAM(S): at 03:00

## 2022-11-05 NOTE — H&P ADULT - PROBLEM SELECTOR PLAN 4
-Follows with Demetri  -was previously on haldol IM once a month but per daughter has not received any in last 5 weeks, goal was to try to wean pt off antipsychotics to see if they were contributing to her dysequilibirum  -Psych consulted, will see tomorrow -Follows with Demetri  -was previously on haldol IM once a month but per daughter has not received any in last 5 weeks, goal was to try to wean pt off antipsychotics to see if they were contributing to her dysequilibirum  -Psych consulted, will see tomorrow  -If pt were to become agitated overnight would first attempt re-orientation, if unsuccessful then would escalate to family member.

## 2022-11-05 NOTE — H&P ADULT - NSHPLABSRESULTS_GEN_ALL_CORE
CXR personally reviewed by me: no PTX or consolidations  EKG showed NSR, no QTc prolongation CXR personally reviewed by me: no PTX or consolidations  EKG showed NSR, no QTc prolongation    CT Head:  FINDINGS:  There is mild diffuse parenchymal volume loss. There are areas of low   attenuation in the periventricular white matter likely related to mild   chronic microvascular ischemic changes. Focal dilated perivascular space   again noted inferior left basal ganglia region.    There is no acute intracranial hemorrhage, parenchymal mass, mass effect   or midline shift. There is no acute territorial infarct. There is no   hydrocephalus. Partial empty sella    The cranium is intact. The visualized paranasal sinuses are well-aerated.    IMPRESSION:  No acute intracranial hemorrhage or acute territorial infarct.  If   symptoms persist, follow-up MRI exam recommended. CXR personally reviewed by me: no PTX or consolidations  EKG showed NSR, no QTc prolongation    CT Head:  FINDINGS:  There is mild diffuse parenchymal volume loss. There are areas of low   attenuation in the periventricular white matter likely related to mild   chronic microvascular ischemic changes. Focal dilated perivascular space   again noted inferior left basal ganglia region.    There is no acute intracranial hemorrhage, parenchymal mass, mass effect   or midline shift. There is no acute territorial infarct. There is no   hydrocephalus. Partial empty sella    The cranium is intact. The visualized paranasal sinuses are well-aerated.    IMPRESSION:  No acute intracranial hemorrhage or acute territorial infarct.  If   symptoms persist, follow-up MRI exam recommended.    CT Chest  FINDINGS:    LUNGS/AIRWAYS/pleura: Study degraded by moderate respiratory motion in   the mid and lower chest. Bilateral dependent atelectasis. There are trace   pleural effusions. No pneumothorax.    MEDIASTINUM/LYMPH NODES: No lymphadenopathy.    HEART/VESSELS: The heart is normal in size. No pericardial effusion.   There is mild coronary artery calcification. The aorta is normal in   caliber.    VISUALIZED UPPER ABDOMEN: Within normal limits.    BONES: There is a mildly displaced sternal fracture in the lower   manubrium above the sternal angle. Deep to the sternum is infiltration   haziness of the tissues but no hyperdense hematoma. Additionally there is   minimally displaced anterior left second rib fracture and nondisplaced   anterior left third rib fracture. There is eccentric kyphosis in the   thoracic spine. There is diffuse qualitative osteopenia and multilevel   discogenic disease. Bony bridging between the lateral left third and   fourth ribs    IMPRESSION:    Mildly displaced sternal fracture as described with infiltration haziness   of the substernal fat without hyperdense hematoma. Minimally displaced   and nondisplaced fractures of the anterior left second and third ribs.   Trace pleural effusions. No pneumothorax.

## 2022-11-05 NOTE — H&P ADULT - PROBLEM SELECTOR PLAN 3
-Followed by Neurology (Dr. Andres), consulted via answering service, will follow up tomorrow  -Patient was recently started on Ingrezza 40mg to be taken for one week before uptitrating to 80mg, per daughter patient seemed to be improving on Ingrezza; medication not in stock w/ pharmacy, spoke to daughter and she will bring in the medication tomorrow when she visits so we can start patient on her home dose  -Titration of Ingrezza per Neurology

## 2022-11-05 NOTE — H&P ADULT - NSHPPHYSICALEXAM_GEN_ALL_CORE
Vital Signs Last 24 Hrs  T(C): 36.6 (05 Nov 2022 16:34), Max: 37.1 (05 Nov 2022 07:59)  T(F): 97.8 (05 Nov 2022 16:34), Max: 98.7 (05 Nov 2022 07:59)  HR: 82 (05 Nov 2022 16:34) (51 - 82)  BP: 148/68 (05 Nov 2022 16:34) (128/71 - 162/97)  BP(mean): --  RR: 18 (05 Nov 2022 16:34) (18 - 20)  SpO2: 95% (05 Nov 2022 16:34) (95% - 100%)    Parameters below as of 05 Nov 2022 16:34  Patient On (Oxygen Delivery Method): room air        CONSTITUTIONAL: Well-groomed, in no apparent distress, sitting in chair  EYES: No conjunctival or scleral injection, non-icteric; PERRLA and symmetric  ENMT: No external nasal lesions; nasal mucosa not inflamed; normal dentition; no pharyngeal injection or exudates, oral mucosa with moist membranes  NECK: Trachea midline without palpable neck mass; thyroid not enlarged and non-tender  RESPIRATORY: Breathing comfortably; no dullness to percussion; lungs CTA without wheeze/rhonchi/rales  CARDIOVASCULAR: +S1S2, RRR, no M/G/R; no carotid bruits; pedal pulses full and symmetric; no lower extremity edema  CHEST: sternocostal tenderness to palpation  GASTROINTESTINAL: No palpable masses or tenderness, +BS throughout, no rebound/guarding; no hepatosplenomegaly; no hernia palpated  LYMPHATIC: No cervical LAD or tenderness; no axillary LAD or tenderness  MUSCULOSKELETAL: Unable to evaluate gait as patient is fall risk; no digital clubbing or cyanosis; no spinal or paraspinal tenderness; examination of the  head/neck, spine/ribs/pelvis, RUE, LUE, RLE, LLE without misalignment, normal strength and tone of extremities  SKIN: No rashes or ulcers noted; no subcutaneous nodules or induration palpable  NEUROLOGIC: limited exam as patient had some difficulties following commands;  sensation intact in LEs b/l to light touch  PSYCHIATRIC: A+O x 3; mood appropriate, redirectable, however needed multiple iterations to understand

## 2022-11-05 NOTE — H&P ADULT - ASSESSMENT
72F w/ PMHx of paranoid schizophrenia w/ tardive dyskinesia, Vit D deficiency, pre-DM presenting after a fall down 20 steps resulting in sternal fracture likely 2/2 ongoing dysequilibrium.

## 2022-11-05 NOTE — H&P ADULT - HISTORY OF PRESENT ILLNESS
72F w/ PMHx of paranoid schizophrenia w/ tardive dyskinesia presenting after a fall down 20 steps resulting in sternal fracture. History obtained from daughter Ama as patient is unable to give accurate history. Per Ama, since patient was discharged from the hospital 5 weeks ago she has had worsening dysequilibrium resulting in more frequent falls. She states that her mother's condition has worsened drastically over the last weeks and is falling around once a day w/ dizziness and loss of balance. Yesterday, patient was left alone for a few seconds while patient's daughter went to get the car and she fell down 20 steps backwards and was found at the bottom of the stairs. She was taken to [facility] where imaging showed a mild displaced sternal fracture, minimally displaced and nondisplaced fractures in the anterior L second and third ribs and patient was transferred to [facility] for trauma eval. In the ED, patient was seen by trauma surgery, no indication for OR at this time. Of note, patient used to be on haldol however has not received any in 5 weeks since her discharge as outpt MD believed it may be contributing to her dysequilibrium per daughter. She saw Dr. Andres (neuro) approx a week ago who recommended starting patient on Ingrezza 40mg for a week which she feels has been helping her mother. At this time, patient's daughter is concerned that she can no longer take care of her mother.     In the ED, patient was hemodynamically stable, required tylenol for pain control 72F w/ PMHx of paranoid schizophrenia w/ tardive dyskinesia presenting after a fall down 20 steps resulting in sternal fracture. History obtained from daughter Ama as patient is unable to give accurate history. Per Ama, since patient was discharged from the hospital 5 weeks ago she has had worsening dysequilibrium resulting in more frequent falls. She states that her mother's condition has worsened drastically over the last weeks and is falling around once a day w/ dizziness and loss of balance. Yesterday, patient was left alone for a few seconds while patient's daughter went to get the car and she fell down 20 steps backwards and was found at the bottom of the stairs. She was taken to [facility] where imaging showed a mild displaced sternal fracture, minimally displaced and nondisplaced fractures in the anterior L second and third ribs and patient was transferred to [facility] for trauma eval. CT Head was negative for acute infarct or bleed. In the ED, patient was seen by trauma surgery, no indication for OR at this time. Of note, patient used to be on haldol however has not received any in 5 weeks since her discharge as outpt MD believed it may be contributing to her dysequilibrium per daughter. She saw Dr. Andres (neuro) approx a week ago who recommended starting patient on Ingrezza 40mg for a week which she feels has been helping her mother. At this time, patient's daughter is concerned that she can no longer take care of her mother.     In the ED, patient was hemodynamically stable, required tylenol for pain control. Of note patient has second MRN ( [id number]) from her stay at [facility].

## 2022-11-05 NOTE — H&P ADULT - PROBLEM SELECTOR PLAN 6
Diet: Regular  DVT: holding chemical dvt i/s/o fall w/ fracture; SCDs  Dispo: pending PT eval    Daughter Ama updated about patient's plan of care multiple times today

## 2022-11-05 NOTE — H&P ADULT - PROBLEM SELECTOR PLAN 1
-Noted on CT imaging from [facility]: Sternal manubrium fx, mildly dispplaced, w//p evidence retrosternal hematoma. Anterior L 2nd rib fx minimally displaced, L 3rd rib fx nondisplaced w/o hemo/pneumothorax. Evaluated by Trauma here, no surgical intervention noted at this time  -Tylenol PRN for pain control  -Incentive spirometer ordered, pt amenable to using it

## 2022-11-05 NOTE — H&P ADULT - PROBLEM SELECTOR PLAN 2
-Ongoing and worsening over the last 5 weeks, per daughter patient has been falling approx daily  -Likely 2/2 underlying psychiatric conditions, head CT showed no evidence of hydrocephalus making NPH less likely. Also could be possible contribution by medications, will optimize with neuro and psychiatry  -will check B12, MMA, RPR, A1c, TSH  -lower suspicion for neuropathic etiology as patient has preserved sensation on her b/l lower extremities  -PT evaluation w/ orthostatic vitals

## 2022-11-05 NOTE — H&P ADULT - NSICDXPASTMEDICALHX_GEN_ALL_CORE_FT
PAST MEDICAL HISTORY:  History of paranoid schizophrenia     Pre-diabetes     Tardive dyskinesia     Vitamin D deficiency

## 2022-11-05 NOTE — H&P ADULT - NSHPREVIEWOFSYSTEMS_GEN_ALL_CORE
Review of Systems: May be unreliable as patient denies many symptoms however her daughter states are present  CONSTITUTIONAL: No fever, weight loss  EYES: No eye pain, visual disturbances, or discharge  ENMT:  No difficulty hearing, tinnitus, vertigo; No sinus or throat pain  RESPIRATORY: No SOB. No cough, wheezing, chills or hemoptysis  CARDIOVASCULAR: No chest pain, palpitations, dizziness, or leg swelling  GASTROINTESTINAL: No abdominal or epigastric pain. No nausea, vomiting, or hematemesis; No diarrhea or constipation. No melena or hematochezia.  GENITOURINARY: No dysuria, frequency, hematuria, or incontinence  NEUROLOGICAL: No headaches, memory loss, loss of strength, numbness, or tremors  SKIN: No itching, burning, rashes, or lesions   LYMPH NODES: No enlarged glands  ENDOCRINE: No heat or cold intolerance; No hair loss  MUSCULOSKELETAL: No joint pain or swelling; No muscle, back pain  PSYCHIATRIC: No depression, anxiety, mood swings, or difficulty sleeping  HEME/LYMPH: No easy bruising, or bleeding gums

## 2022-11-06 LAB
A1C WITH ESTIMATED AVERAGE GLUCOSE RESULT: 6.1 % — HIGH (ref 4–5.6)
ALBUMIN SERPL ELPH-MCNC: 3.5 G/DL — SIGNIFICANT CHANGE UP (ref 3.3–5)
ALP SERPL-CCNC: 93 U/L — SIGNIFICANT CHANGE UP (ref 40–120)
ALT FLD-CCNC: 14 U/L — SIGNIFICANT CHANGE UP (ref 10–45)
ANION GAP SERPL CALC-SCNC: 9 MMOL/L — SIGNIFICANT CHANGE UP (ref 5–17)
AST SERPL-CCNC: 16 U/L — SIGNIFICANT CHANGE UP (ref 10–40)
BILIRUB SERPL-MCNC: 0.5 MG/DL — SIGNIFICANT CHANGE UP (ref 0.2–1.2)
BUN SERPL-MCNC: 12 MG/DL — SIGNIFICANT CHANGE UP (ref 7–23)
CALCIUM SERPL-MCNC: 9 MG/DL — SIGNIFICANT CHANGE UP (ref 8.4–10.5)
CHLORIDE SERPL-SCNC: 105 MMOL/L — SIGNIFICANT CHANGE UP (ref 96–108)
CHOLEST SERPL-MCNC: 155 MG/DL — SIGNIFICANT CHANGE UP
CO2 SERPL-SCNC: 28 MMOL/L — SIGNIFICANT CHANGE UP (ref 22–31)
CREAT SERPL-MCNC: 0.8 MG/DL — SIGNIFICANT CHANGE UP (ref 0.5–1.3)
EGFR: 79 ML/MIN/1.73M2 — SIGNIFICANT CHANGE UP
ESTIMATED AVERAGE GLUCOSE: 128 MG/DL — HIGH (ref 68–114)
FOLATE SERPL-MCNC: 18 NG/ML — SIGNIFICANT CHANGE UP
GLUCOSE SERPL-MCNC: 77 MG/DL — SIGNIFICANT CHANGE UP (ref 70–99)
HCT VFR BLD CALC: 37.5 % — SIGNIFICANT CHANGE UP (ref 34.5–45)
HCV AB S/CO SERPL IA: 0.19 S/CO — SIGNIFICANT CHANGE UP (ref 0–0.99)
HCV AB SERPL-IMP: SIGNIFICANT CHANGE UP
HDLC SERPL-MCNC: 55 MG/DL — SIGNIFICANT CHANGE UP
HGB BLD-MCNC: 11.6 G/DL — SIGNIFICANT CHANGE UP (ref 11.5–15.5)
LIPID PNL WITH DIRECT LDL SERPL: 89 MG/DL — SIGNIFICANT CHANGE UP
MAGNESIUM SERPL-MCNC: 2.1 MG/DL — SIGNIFICANT CHANGE UP (ref 1.6–2.6)
MCHC RBC-ENTMCNC: 28.6 PG — SIGNIFICANT CHANGE UP (ref 27–34)
MCHC RBC-ENTMCNC: 30.9 GM/DL — LOW (ref 32–36)
MCV RBC AUTO: 92.4 FL — SIGNIFICANT CHANGE UP (ref 80–100)
NON HDL CHOLESTEROL: 100 MG/DL — SIGNIFICANT CHANGE UP
NRBC # BLD: 0 /100 WBCS — SIGNIFICANT CHANGE UP (ref 0–0)
PHOSPHATE SERPL-MCNC: 2.9 MG/DL — SIGNIFICANT CHANGE UP (ref 2.5–4.5)
PLATELET # BLD AUTO: 145 K/UL — LOW (ref 150–400)
POTASSIUM SERPL-MCNC: 3.8 MMOL/L — SIGNIFICANT CHANGE UP (ref 3.5–5.3)
POTASSIUM SERPL-SCNC: 3.8 MMOL/L — SIGNIFICANT CHANGE UP (ref 3.5–5.3)
PROT SERPL-MCNC: 6.9 G/DL — SIGNIFICANT CHANGE UP (ref 6–8.3)
RBC # BLD: 4.06 M/UL — SIGNIFICANT CHANGE UP (ref 3.8–5.2)
RBC # FLD: 14.6 % — HIGH (ref 10.3–14.5)
SODIUM SERPL-SCNC: 142 MMOL/L — SIGNIFICANT CHANGE UP (ref 135–145)
TRIGL SERPL-MCNC: 53 MG/DL — SIGNIFICANT CHANGE UP
TSH SERPL-MCNC: 1.03 UIU/ML — SIGNIFICANT CHANGE UP (ref 0.27–4.2)
VIT B12 SERPL-MCNC: 518 PG/ML — SIGNIFICANT CHANGE UP (ref 232–1245)
WBC # BLD: 3.3 K/UL — LOW (ref 3.8–10.5)
WBC # FLD AUTO: 3.3 K/UL — LOW (ref 3.8–10.5)

## 2022-11-06 PROCEDURE — 99232 SBSQ HOSP IP/OBS MODERATE 35: CPT

## 2022-11-06 PROCEDURE — 99222 1ST HOSP IP/OBS MODERATE 55: CPT

## 2022-11-06 RX ORDER — PYRIDOXINE HCL (VITAMIN B6) 100 MG
50 TABLET ORAL DAILY
Refills: 0 | Status: DISCONTINUED | OUTPATIENT
Start: 2022-11-06 | End: 2022-11-18

## 2022-11-06 RX ORDER — OLANZAPINE 15 MG/1
2.5 TABLET, FILM COATED ORAL EVERY 8 HOURS
Refills: 0 | Status: DISCONTINUED | OUTPATIENT
Start: 2022-11-06 | End: 2022-11-18

## 2022-11-06 RX ADMIN — Medication 1000 UNIT(S): at 12:00

## 2022-11-06 RX ADMIN — Medication 650 MILLIGRAM(S): at 23:57

## 2022-11-06 RX ADMIN — Medication 50 MILLIGRAM(S): at 12:04

## 2022-11-06 RX ADMIN — Medication 650 MILLIGRAM(S): at 21:33

## 2022-11-06 RX ADMIN — Medication 1 TABLET(S): at 12:00

## 2022-11-06 RX ADMIN — Medication 650 MILLIGRAM(S): at 05:38

## 2022-11-06 RX ADMIN — Medication 650 MILLIGRAM(S): at 06:02

## 2022-11-06 RX ADMIN — Medication 650 MILLIGRAM(S): at 14:07

## 2022-11-06 RX ADMIN — Medication 650 MILLIGRAM(S): at 14:37

## 2022-11-06 NOTE — BH CONSULTATION LIAISON ASSESSMENT NOTE - HPI (INCLUDE ILLNESS QUALITY, SEVERITY, DURATION, TIMING, CONTEXT, MODIFYING FACTORS, ASSOCIATED SIGNS AND SYMPTOMS)
This is a 72/yo woman with a history of schizophrenia and tardive dyskinesia presenting with a sternal fracture after falling down a flight of steps.     Per the patient, she has been eating and sleeping okay. She says that her pain is under control except when she makes specific movements.    Per the daughter, the patient has not been the same since being discharged from [facility] (Jun-Sep 2022). She was admitted for increasing paranoia and aggression toward their landlord. She says that the patient could relatively take care of herself prior to that psych admission ("She just needed a little help here and there") but has been globally unable to do so since discharge 5 weeks ago. She says that the patient has been unsteady and unbalanced with difficulties feeding herself and communicating. She now needs assistance for all ADLs.    Per [facility] notes, the patient had been trialed on aripiprazole and risperidone (d/c'ed due to agranulocytosis) and eventually was started on haloperidol decanoate 150mg. Her next dose was scheduled for [id number], but the daughter held off per the PCP's recommendation. Patient does not have a substance use history. This is a 72/yo woman with a history of schizophrenia and tardive dyskinesia presenting with a sternal fracture after falling down a flight of steps.     Per the patient, she has been eating and sleeping okay. She says that her pain is under control except when she makes specific movements. Interview limited due patient's communication difficulties.     Per the daughter, the patient has not been the same since being discharged from [facility] (Jun-Sep 2022). She was admitted for increasing paranoia and aggression toward their landlord. She says that the patient could relatively take care of herself prior to that psych admission ("She just needed a little help here and there") but has been globally unable to do so since discharge 5 weeks ago. She says that the patient has been unsteady and unbalanced with difficulties feeding herself and communicating. She now needs assistance for all ADLs. The daughter also states that the patient has been having memory issues with impaired short term memory and inability recalling past events. She says that the patient describes it as "feel[ing] like an empty body."    Per [facility] notes:   "Patient has a dx of schizophrenia. No known prior suicide attempts. H/o chronic noncompliance. Daughter reports there is a h/o aggression. Was seen in [facility] ED x2 for paranoid delusions leading to conflicts with neighbors/landlord in the past year. She was not considered eligible for involuntary psychiatric care at the time but was kept for observation in medicine due to family not agreeing with discharge to the community. Daughter also reports some admissions to [facility] in the last 2 years but when records were requested, the medical record department denied there were any records. [facility] admission  7/4/2016 - did not follow up with aftercare referral to [facility]. [facility] admission 4/9/2013  Did not follow up at [facility]."    The patient had been trialed on aripiprazole and risperidone (d/c'ed due to agranulocytosis) and eventually was started on haloperidol decanoate 150mg. Her next dose was scheduled for [id number], but the daughter held off per the PCP's recommendation. Patient has historical ACT/AOT orders.

## 2022-11-06 NOTE — BH CONSULTATION LIAISON ASSESSMENT NOTE - NSBHCHARTREVIEWVS_PSY_A_CORE FT
Vital Signs Last 24 Hrs  T(C): 36.8 (06 Nov 2022 08:20), Max: 37.2 (05 Nov 2022 20:03)  T(F): 98.2 (06 Nov 2022 08:20), Max: 98.9 (05 Nov 2022 20:03)  HR: 56 (06 Nov 2022 08:20) (56 - 82)  BP: 138/80 (06 Nov 2022 08:20) (127/54 - 148/68)  BP(mean): --  RR: 18 (06 Nov 2022 08:20) (18 - 18)  SpO2: 97% (06 Nov 2022 08:20) (95% - 100%)    Parameters below as of 06 Nov 2022 08:20  Patient On (Oxygen Delivery Method): room air

## 2022-11-06 NOTE — BH CONSULTATION LIAISON ASSESSMENT NOTE - SUMMARY
This is a 73y/o woman with a history of schizophrenia, tardive dyskinesia, multiple previous psychiatric admissions, and chronic non-compliance presenting after a fall in the context of decreased functionality and balanced s/p psychiatric discharge.    Patient was calm and cooperative on exam, although interview was limited given impairments. Given the protracted and complicated recent inpatient admission with subsequent decompensation after discharge, the patient would benefit from readmission for highly monitored medication management.

## 2022-11-06 NOTE — BH CONSULTATION LIAISON ASSESSMENT NOTE - CURRENT MEDICATION
MEDICATIONS  (STANDING):  cholecalciferol 1000 Unit(s) Oral daily  multivitamin 1 Tablet(s) Oral daily  pyridoxine 50 milliGRAM(s) Oral daily    MEDICATIONS  (PRN):  acetaminophen     Tablet .. 650 milliGRAM(s) Oral every 6 hours PRN Temp greater or equal to 38C (100.4F), Mild Pain (1 - 3)  melatonin 3 milliGRAM(s) Oral at bedtime PRN Insomnia

## 2022-11-06 NOTE — BH CONSULTATION LIAISON ASSESSMENT NOTE - NSBHATTESTCOMMENTATTENDFT_PSY_A_CORE
This is a 72-y.o. AAF patient with a history of schizophrenia, tardive dyskinesia, multiple previous psychiatric admissions, and chronic non-compliance presenting after a fall in the context of decreased functionality and balanced s/p psychiatric discharge.    I have seen and evaluated this patient myself. Chart, labs, meds reviewed. I agree with resident's assessment and plan. Patient may warrant inpatient care for further meds adjustment.

## 2022-11-06 NOTE — BH CONSULTATION LIAISON ASSESSMENT NOTE - NSBHCHARTREVIEWLAB_PSY_A_CORE FT
11.6   3.30  )-----------( 145      ( 06 Nov 2022 06:03 )             37.5     11-06    142  |  105  |  12  ----------------------------<  77  3.8   |  28  |  0.80    Ca    9.0      06 Nov 2022 06:01  Phos  2.9     11-06  Mg     2.1     11-06    TPro  6.9  /  Alb  3.5  /  TBili  0.5  /  DBili  x   /  AST  16  /  ALT  14  /  AlkPhos  93  11-06

## 2022-11-06 NOTE — BH CONSULTATION LIAISON ASSESSMENT NOTE - NSBHCONSULTMEDAGITATION_PSY_A_CORE FT
olanzapine 2.5mg IM  haloperidol 0.5–2mg IV olanzapine 2.5mg IM Q8H PRN or  haloperidol 0.5–2mg IV Q6H PRN

## 2022-11-06 NOTE — CONSULT NOTE ADULT - SUBJECTIVE AND OBJECTIVE BOX
TRAUMA SURGERY CONSULT NOTE  --------------------------------------------------------------------------------------------    HPI:     72F hx paranoid schizophrenia, tardive dyskinesia, cognitive deficit, pre diabetes who presents as transfer to [facility] ED from [facility] ED after fall down stairs 3 days ago. Denies HS, LOC, not on AC. Patient reports persistent chest pain post fall prompting presentation to [facility] ED.  Notably patient reports recent disequilibrium since starting new psychiatric medications ~ 1 month ago. Haldol/Cogentin. At [facility] underwent CTH, CT C/A/P found to have sternal manubrium fx, mildly displaced, w/o evidence retrosternal hematoma. Also found to have anterior L 2nd Rib fx minimally displaced, L 3rd Rib fx non displaced w/o evidence of hemothorax/pneumothorax. Trauma surgery consulted to evaluate.     Patient seen and examined at bedside. Denies headache, dizziness, lightheadedness, SOB, nausea, vomiting, fevers, chills. Pulls 1250cc on IS.     In ED HDS. AF. Labs unremarkable.     PAST MEDICAL & SURGICAL HISTORY:    FAMILY HISTORY:    [] Family history not pertinent as reviewed with the patient and family    ALLERGIES: No Known Allergies    CURRENT MEDICATIONS  MEDICATIONS (STANDING):   MEDICATIONS (PRN):  --------------------------------------------------------------------------------------------    Vitals:   T(C): 36.6 (11-05-22 @ 05:22), Max: 36.9 (11-04-22 @ 21:39)  HR: 51 (11-05-22 @ 05:22) (51 - 57)  BP: 149/84 (11-05-22 @ 05:22) (149/84 - 162/97)  RR: 18 (11-05-22 @ 05:22) (18 - 18)  SpO2: 100% (11-05-22 @ 05:22) (100% - 100%)  CAPILLARY BLOOD GLUCOSE        CAPILLARY BLOOD GLUCOSE              PHYSICAL EXAM:    General: Alert, NAD  Neuro: A+Ox3  HEENT: NC/AT, no asymmetry, no scleral icterus  Neck: Soft, supple  Cardio: RRR   Resp: Airway patent, unlabored breathing, 1250 on IS, on RA   Thorax: Midline chest tender to palpation, (-) ecchymosis, (-) crepitus, (-) gross deformity  GI/Abd: Soft, NT/ND, no rebound/guarding, no masses palpated  Vascular: All 4 extremities warm   Skin: Intact, no breakdown  Musculoskeletal: All 4 extremities moving spontaneously, no limitations  --------------------------------------------------------------------------------------------    LABS  CBC (11-05 @ 00:58)                              11.3<L>                         3.89    )----------------(  219        60.4  % Neutrophils, 28.0  % Lymphocytes, ANC: 2.35                                37.0      BMP (11-05 @ 00:58)             136     |  102     |  12    		Ca++ --      Ca 8.9                ---------------------------------( 88    		Mg 2.0                5.3     |  25      |  0.63  			Ph --        LFTs (11-05 @ 00:58)      TPro 7.1 / Alb 3.6 / TBili 0.6 / DBili -- / AST 38 / ALT 20 / AlkPhos 83    Coags (11-05 @ 01:56)  aPTT 22.5<L> / INR 1.01 / PT 11.6        VBG (11-05 @ 00:42)     7.35 / 54<H> / 25 / 30<H> / 3.2<H> / 45.0<L>%     Lactate: 1.4    --------------------------------------------------------------------------------------------    MICROBIOLOGY      --------------------------------------------------------------------------------------------    IMAGING  
Neurology Consult    Reason for Consult: Patient is a 72y old  Female who presents with a chief complaint of Fall, dysequilibrium (05 Nov 2022 18:02)      HPI:  72F w/ PMHx of paranoid schizophrenia w/ tardive dyskinesia presenting after a fall down 20 steps resulting in sternal fracture. History obtained from daughter Ama as patient is unable to give accurate history. Per Ama, since patient was discharged from the hospital 5 weeks ago she has had worsening dysequilibrium resulting in more frequent falls. She states that her mother's condition has worsened drastically over the last weeks and is falling around once a day w/ dizziness and loss of balance. Yesterday, patient was left alone for a few seconds while patient's daughter went to get the car and she fell down 20 steps backwards and was found at the bottom of the stairs. She was taken to [facility] where imaging showed a mild displaced sternal fracture, minimally displaced and nondisplaced fractures in the anterior L second and third ribs and patient was transferred to [facility] for trauma eval. CT Head was negative for acute infarct or bleed. In the ED, patient was seen by trauma surgery, no indication for OR at this time. Of note, patient used to be on haldol however has not received any in 5 weeks since her discharge as outpt MD believed it may be contributing to her dysequilibrium per daughter. She saw Dr. Andres (neuro) approx a week ago who recommended starting patient on Ingrezza 40mg for a week which she feels has been helping her mother. At this time, patient's daughter is concerned that she can no longer take care of her mother.     In the ED, patient was hemodynamically stable, required tylenol for pain control. Of note patient has second MRN ( [id number]) from her stay at [facility].  (05 Nov 2022 18:02)       PAST MEDICAL & SURGICAL HISTORY:  Vitamin D deficiency      History of paranoid schizophrenia      Pre-diabetes      Tardive dyskinesia      No significant past surgical history          Allergies: Allergies    No Known Allergies    Intolerances        Social History: Denies toxic habits including tobacco, ETOH or illicit drugs.    Family History: FAMILY HISTORY:  FH: schizophrenia (Child)    . No family history of strokes    Medications: MEDICATIONS  (STANDING):  cholecalciferol 1000 Unit(s) Oral daily  multivitamin 1 Tablet(s) Oral daily  pyridoxine 50 milliGRAM(s) Oral daily    MEDICATIONS  (PRN):  acetaminophen     Tablet .. 650 milliGRAM(s) Oral every 6 hours PRN Temp greater or equal to 38C (100.4F), Mild Pain (1 - 3)  melatonin 3 milliGRAM(s) Oral at bedtime PRN Insomnia      Review of Systems:  CONSTITUTIONAL:  No weight loss, fever, chills, weakness or fatigue.  HEENT:  Eyes:  No visual loss, blurred vision, double vision or yellow sclera. Ears, Nose, Throat:  No hearing loss, sneezing, congestion, runny nose or sore throat.  SKIN:  No rash or itching.  CARDIOVASCULAR:  No chest pain, chest pressure or chest discomfort. No palpitations or edema.  RESPIRATORY:  No shortness of breath, cough or sputum.  GASTROINTESTINAL:  No anorexia, nausea, vomiting or diarrhea. No abdominal pain or blood.  GENITOURINARY:  No burning on urination or incontinence   NEUROLOGICAL:  No headache, dizziness, syncope, paralysis, ataxia, numbness or tingling in the extremities. No change in bowel or bladder control. no limb weakness. no vision changes.   MUSCULOSKELETAL:  No muscle, back pain, joint pain or stiffness.  HEMATOLOGIC:  No anemia, bleeding or bruising.  LYMPHATICS:  No enlarged nodes. No history of splenectomy.  PSYCHIATRIC:   schizophrenia   ENDOCRINOLOGIC:  No reports of sweating, cold or heat intolerance. No polyuria or polydipsia.      Vitals:  Vital Signs Last 24 Hrs  T(C): 36.8 (06 Nov 2022 04:44), Max: 37.2 (05 Nov 2022 20:03)  T(F): 98.2 (06 Nov 2022 04:44), Max: 98.9 (05 Nov 2022 20:03)  HR: 60 (06 Nov 2022 04:44) (60 - 82)  BP: 144/75 (06 Nov 2022 04:44) (127/54 - 148/68)  BP(mean): --  RR: 18 (06 Nov 2022 04:44) (18 - 20)  SpO2: 100% (06 Nov 2022 04:44) (95% - 100%)    Parameters below as of 06 Nov 2022 04:44  Patient On (Oxygen Delivery Method): room air        General Exam:   General Appearance: Appropriately dressed and in no acute distress       Head: Normocephalic, atraumatic and no dysmorphic features  Ear, Nose, and Throat: Moist mucous membranes  CVS: S1S2+  Resp: No SOB, no wheeze or rhonchi  GI: soft NT/ND  Extremities: No edema or cyanosis  Skin: No bruises or rashes     Neurological Exam:  Mental Status: Awake, alert and oriented x 1-2.  Able to follow simple  verbal commands. Able to name and repeat. fluent speech. No obvious aphasia   or dysarthria noted.   Cranial Nerves: PERRL, EOMI, VFFC, sensation V1-V3 intact,  no obvious facial asymmetry, equal elevation of palate, scm/trap 5/5, tongue is midline on protrusion. no obvious papilledema on fundoscopic exam. hearing is grossly intact.  + Tardive movements   Motor: SHEPHERD equally but tardive movements noted   Sensation: Intact to light touch and pinprick throughout   Coordination:  trouble given involuntary movements   Gait: unsteady     Data/Labs/Imaging which I personally reviewed.     Labs:     CBC Full  -  ( 06 Nov 2022 06:03 )  WBC Count : 3.30 K/uL  RBC Count : 4.06 M/uL  Hemoglobin : 11.6 g/dL  Hematocrit : 37.5 %  Platelet Count - Automated : 145 K/uL  Mean Cell Volume : 92.4 fl  Mean Cell Hemoglobin : 28.6 pg  Mean Cell Hemoglobin Concentration : 30.9 gm/dL  Auto Neutrophil # : x  Auto Lymphocyte # : x  Auto Monocyte # : x  Auto Eosinophil # : x  Auto Basophil # : x  Auto Neutrophil % : x  Auto Lymphocyte % : x  Auto Monocyte % : x  Auto Eosinophil % : x  Auto Basophil % : x    11-06    142  |  105  |  12  ----------------------------<  77  3.8   |  28  |  0.80    Ca    9.0      06 Nov 2022 06:01  Phos  2.9     11-06  Mg     2.1     11-06    TPro  6.9  /  Alb  3.5  /  TBili  0.5  /  DBili  x   /  AST  16  /  ALT  14  /  AlkPhos  93  11-06    LIVER FUNCTIONS - ( 06 Nov 2022 06:01 )  Alb: 3.5 g/dL / Pro: 6.9 g/dL / ALK PHOS: 93 U/L / ALT: 14 U/L / AST: 16 U/L / GGT: x           PT/INR - ( 05 Nov 2022 01:56 )   PT: 11.6 sec;   INR: 1.01 ratio         PTT - ( 05 Nov 2022 01:56 )  PTT:22.5 sec

## 2022-11-06 NOTE — CONSULT NOTE ADULT - ASSESSMENT
72F hx paranoid schizophrenia, tardive dyskinesia, cognitive deficit, pre diabetes who presents as transfer to [facility] ED from [facility] ED after fall down stairs 3 days ago. Denies HS, LOC, not on AC. Patient reports persistent chest pain post fall prompting presentation to [facility] ED.  At [facility] underwent CTH, CT C/A/P found to have sternal manubrium fx, mildly displaced, w/o evidence retrosternal hematoma. Also found to have anterior L 2nd Rib fx minimally displaced, L 3rd Rib fx non displaced w/o evidence of hemothorax/pneumothorax. Trauma surgery consulted to evaluate. Patient on RA, pulling appropriate volume on IS.     Plan:  -No acute surgical intervention   -Consider medicine admission to work up recent hx of disequilibrium   -Continue with Incentive Spirometry   -Please reconsult Surgery with any questions/concerns    Discussed with Attending Surgeon Dr. Sam García MD PGY2   ACS   [id number]    
72F w/ paranoid schizophrenia w/ tardive dyskinesia presenting after a fall down 20 steps resulting in sternal fracture. + frequent falls and dizziness with poor balaance.   Of note, patient used to be on haldol however has not received any in 5 weeks since her discharge as outpt MD believed it may be contributing to her dysequilibrium per daughter. Patient saw my collegue Dr. Andres (neuro) approx a week ago who recommended starting patient on Ingrezza 40mg for a week which she feels has been helping her mother.   CT Head was negative for acute infarct or bleed. (other MRN)     Impression:  1)  Tardive dyskinesia 2/2 long standing antipyschotic use.    2) fall 2/2 dysequilibrium     - I spoke with Dr. Mullins who still suggests ingrezza 40mg for 1 week then can increase to 80mg.    - Vitamin B6 supplement  - Psych evaluation  - check orthostaitcs   - Dr. Mullins to f/u in AM   - telemetry  - PT/OT   - check FS, glucose control <180  - GI/DVT ppx  - Counseling on diet, exercise, and medication adherence was done  - Counseling on smoking cessation and alcohol consumption offered when appropriate.  - Pain assessed and judicious use of narcotics when appropriate was discussed.    - Stroke education given when appropriate.  - Importance of fall prevention discussed.   - Differential diagnosis and plan of care discussed with patient and/or family and primary team  - Thank you for allowing me to participate in the care of this patient. Call with questions.     Diogenes Meeks MD  Vascular Neurology

## 2022-11-07 LAB — T PALLIDUM AB TITR SER: NEGATIVE — SIGNIFICANT CHANGE UP

## 2022-11-07 PROCEDURE — 99231 SBSQ HOSP IP/OBS SF/LOW 25: CPT

## 2022-11-07 PROCEDURE — 99232 SBSQ HOSP IP/OBS MODERATE 35: CPT

## 2022-11-07 RX ORDER — HALOPERIDOL DECANOATE 100 MG/ML
2 INJECTION INTRAMUSCULAR
Refills: 0 | Status: DISCONTINUED | OUTPATIENT
Start: 2022-11-07 | End: 2022-11-18

## 2022-11-07 RX ORDER — DONEPEZIL HYDROCHLORIDE 10 MG/1
5 TABLET, FILM COATED ORAL AT BEDTIME
Refills: 0 | Status: DISCONTINUED | OUTPATIENT
Start: 2022-11-07 | End: 2022-11-18

## 2022-11-07 RX ADMIN — Medication 3 MILLIGRAM(S): at 21:50

## 2022-11-07 RX ADMIN — Medication 650 MILLIGRAM(S): at 12:30

## 2022-11-07 RX ADMIN — Medication 1000 UNIT(S): at 11:14

## 2022-11-07 RX ADMIN — Medication 1 TABLET(S): at 11:14

## 2022-11-07 RX ADMIN — Medication 650 MILLIGRAM(S): at 11:59

## 2022-11-07 RX ADMIN — HALOPERIDOL DECANOATE 2 MILLIGRAM(S): 100 INJECTION INTRAMUSCULAR at 16:53

## 2022-11-07 RX ADMIN — Medication 650 MILLIGRAM(S): at 05:54

## 2022-11-07 RX ADMIN — Medication 650 MILLIGRAM(S): at 23:30

## 2022-11-07 RX ADMIN — Medication 50 MILLIGRAM(S): at 11:15

## 2022-11-07 RX ADMIN — Medication 650 MILLIGRAM(S): at 16:53

## 2022-11-07 RX ADMIN — Medication 650 MILLIGRAM(S): at 06:00

## 2022-11-07 RX ADMIN — Medication 650 MILLIGRAM(S): at 17:30

## 2022-11-07 RX ADMIN — DONEPEZIL HYDROCHLORIDE 5 MILLIGRAM(S): 10 TABLET, FILM COATED ORAL at 21:50

## 2022-11-07 NOTE — BH CONSULTATION LIAISON PROGRESS NOTE - NSBHCHARTREVIEWVS_PSY_A_CORE FT
Vital Signs Last 24 Hrs  T(C): 37.1 (07 Nov 2022 08:53), Max: 37.1 (06 Nov 2022 23:44)  T(F): 98.8 (07 Nov 2022 08:53), Max: 98.8 (07 Nov 2022 08:53)  HR: 66 (07 Nov 2022 08:53) (60 - 66)  BP: 124/70 (07 Nov 2022 08:53) (123/75 - 135/72)  BP(mean): --  RR: 18 (07 Nov 2022 08:53) (18 - 18)  SpO2: 98% (07 Nov 2022 09:50) (98% - 100%)    Parameters below as of 07 Nov 2022 09:50  Patient On (Oxygen Delivery Method): room air

## 2022-11-07 NOTE — PHYSICAL THERAPY INITIAL EVALUATION ADULT - FOLLOWS COMMANDS/ANSWERS QUESTIONS, REHAB EVAL
repeated commands, mildly inc'd processing time/75% of the time/able to follow single-step instructions

## 2022-11-07 NOTE — BH CONSULTATION LIAISON PROGRESS NOTE - CURRENT MEDICATION
MEDICATIONS  (STANDING):  cholecalciferol 1000 Unit(s) Oral daily  INGREZZA 40 milliGRAM(s) 40 milliGRAM(s) Oral daily  multivitamin 1 Tablet(s) Oral daily  pyridoxine 50 milliGRAM(s) Oral daily    MEDICATIONS  (PRN):  acetaminophen     Tablet .. 650 milliGRAM(s) Oral every 6 hours PRN Temp greater or equal to 38C (100.4F), Mild Pain (1 - 3)  melatonin 3 milliGRAM(s) Oral at bedtime PRN Insomnia  OLANZapine Injectable 2.5 milliGRAM(s) IntraMuscular every 8 hours PRN Agitation

## 2022-11-07 NOTE — PHYSICAL THERAPY INITIAL EVALUATION ADULT - REHAB POTENTIAL, PT EVAL
"The patient is given the patient education document:  ""Artificial Tears"" good, to achieve stated therapy goals

## 2022-11-07 NOTE — BH CONSULTATION LIAISON PROGRESS NOTE - NSBHASSESSMENTFT_PSY_ALL_CORE
This is a 71y/o woman with a history of schizophrenia, tardive dyskinesia, multiple previous psychiatric admissions, and chronic non-compliance presenting after a fall in the context of decreased functionality and balanced s/p psychiatric discharge.    Patient was calm and cooperative on exam, although interview was limited given impairments. pt denies affective symptoms of depression/anxiety, no si/hi, denies psychotic symptoms. dtr called, concerned about pt's recent fall history, and indicates concern at home as dtr has stairs, worried pt may fall again. Dtr feels pt could also benefit from [facility].    Agree with plan to consider starting low dose haldol 2 mg po BID for psychotic disorder, pt did not receive last haldol dec shot due to concerns for LE weakness. dtr in accord with plan to give low dose haldol PO to prevent decompensation. pt can f/u with her outpt psychiatrist at [facility], Dr Harris.  Consider PT evaluation for [facility] and pt's LE weakness.

## 2022-11-07 NOTE — PHYSICAL THERAPY INITIAL EVALUATION ADULT - ADDITIONAL COMMENTS
Pt lives in a PH with her daughter +20 steps with HR. PTA pt states she was ambulating (I) without an AD and was (I) with all ADLs, however pt is a poor historian. As per chart review pt was previously admitted 5 weeks ago, since then pt required increased assistance with all ADLs and was unsteady. Pt's daughter unable to care for patient at this time

## 2022-11-07 NOTE — PATIENT PROFILE ADULT - FALL HARM RISK - HARM RISK INTERVENTIONS
Assistance with ambulation/Assistance OOB with selected safe patient handling equipment/Communicate Risk of Fall with Harm to all staff/Discuss with provider need for PT consult/Monitor gait and stability/Reinforce activity limits and safety measures with patient and family/Tailored Fall Risk Interventions/Visual Cue: Yellow wristband and red socks/Bed in lowest position, wheels locked, appropriate side rails in place/Call bell, personal items and telephone in reach/Instruct patient to call for assistance before getting out of bed or chair/Non-slip footwear when patient is out of bed/[place] to call system/Physically safe environment - no spills, clutter or unnecessary equipment/Purposeful Proactive Rounding/Room/bathroom lighting operational, light cord in reach

## 2022-11-07 NOTE — BH CONSULTATION LIAISON PROGRESS NOTE - NSBHFUPINTERVALHXFT_PSY_A_CORE
pt seen, AOA x 3, denies depression/anxiety, no si/hi, no overt psychosis evident or reported. no AH/VH or paranoid behaviors present. sleep and appetite fair. compliant with current treatment plan.

## 2022-11-07 NOTE — PHYSICAL THERAPY INITIAL EVALUATION ADULT - GAIT DEVIATIONS NOTED, PT EVAL
decreased crystal/increased time in double stance/decreased step length/decreased stride length/decreased weight-shifting ability

## 2022-11-07 NOTE — PHYSICAL THERAPY INITIAL EVALUATION ADULT - PRECAUTIONS/LIMITATIONS, REHAB EVAL
“Floppy Iris Syndrome” can be associated with current or past Flomax use. It can make cataract surgery more difficult and increase the risk of complications including iris damage and posterior capsule rupture with possible need for a second surgery. fall precautions

## 2022-11-07 NOTE — PHYSICAL THERAPY INITIAL EVALUATION ADULT - PLANNED THERAPY INTERVENTIONS, PT EVAL
GOAL: Pt will ascend/descend (20) steps (I) with U HR and step over step pattern in 4 weeks./balance training/bed mobility training/gait training/strengthening/transfer training

## 2022-11-08 LAB
HCT VFR BLD CALC: 36.9 % — SIGNIFICANT CHANGE UP (ref 34.5–45)
HGB BLD-MCNC: 11.6 G/DL — SIGNIFICANT CHANGE UP (ref 11.5–15.5)
MCHC RBC-ENTMCNC: 28.5 PG — SIGNIFICANT CHANGE UP (ref 27–34)
MCHC RBC-ENTMCNC: 31.4 GM/DL — LOW (ref 32–36)
MCV RBC AUTO: 90.7 FL — SIGNIFICANT CHANGE UP (ref 80–100)
NRBC # BLD: 0 /100 WBCS — SIGNIFICANT CHANGE UP (ref 0–0)
PLATELET # BLD AUTO: 277 K/UL — SIGNIFICANT CHANGE UP (ref 150–400)
RBC # BLD: 4.07 M/UL — SIGNIFICANT CHANGE UP (ref 3.8–5.2)
RBC # FLD: 14.6 % — HIGH (ref 10.3–14.5)
WBC # BLD: 3.98 K/UL — SIGNIFICANT CHANGE UP (ref 3.8–10.5)
WBC # FLD AUTO: 3.98 K/UL — SIGNIFICANT CHANGE UP (ref 3.8–10.5)

## 2022-11-08 PROCEDURE — 99232 SBSQ HOSP IP/OBS MODERATE 35: CPT

## 2022-11-08 RX ADMIN — Medication 650 MILLIGRAM(S): at 02:22

## 2022-11-08 RX ADMIN — Medication 50 MILLIGRAM(S): at 11:52

## 2022-11-08 RX ADMIN — HALOPERIDOL DECANOATE 2 MILLIGRAM(S): 100 INJECTION INTRAMUSCULAR at 17:59

## 2022-11-08 RX ADMIN — Medication 650 MILLIGRAM(S): at 14:50

## 2022-11-08 RX ADMIN — Medication 1 TABLET(S): at 11:52

## 2022-11-08 RX ADMIN — DONEPEZIL HYDROCHLORIDE 5 MILLIGRAM(S): 10 TABLET, FILM COATED ORAL at 21:02

## 2022-11-08 RX ADMIN — Medication 1000 UNIT(S): at 11:51

## 2022-11-08 RX ADMIN — Medication 650 MILLIGRAM(S): at 21:01

## 2022-11-08 RX ADMIN — Medication 650 MILLIGRAM(S): at 13:55

## 2022-11-08 RX ADMIN — Medication 650 MILLIGRAM(S): at 00:00

## 2022-11-08 RX ADMIN — HALOPERIDOL DECANOATE 2 MILLIGRAM(S): 100 INJECTION INTRAMUSCULAR at 05:52

## 2022-11-09 PROCEDURE — 99232 SBSQ HOSP IP/OBS MODERATE 35: CPT

## 2022-11-09 RX ADMIN — Medication 3 MILLIGRAM(S): at 21:50

## 2022-11-09 RX ADMIN — Medication 650 MILLIGRAM(S): at 05:49

## 2022-11-09 RX ADMIN — Medication 1000 UNIT(S): at 11:26

## 2022-11-09 RX ADMIN — Medication 650 MILLIGRAM(S): at 02:00

## 2022-11-09 RX ADMIN — HALOPERIDOL DECANOATE 2 MILLIGRAM(S): 100 INJECTION INTRAMUSCULAR at 17:07

## 2022-11-09 RX ADMIN — Medication 650 MILLIGRAM(S): at 11:35

## 2022-11-09 RX ADMIN — Medication 650 MILLIGRAM(S): at 18:56

## 2022-11-09 RX ADMIN — HALOPERIDOL DECANOATE 2 MILLIGRAM(S): 100 INJECTION INTRAMUSCULAR at 05:50

## 2022-11-09 RX ADMIN — Medication 1 TABLET(S): at 11:26

## 2022-11-09 RX ADMIN — Medication 50 MILLIGRAM(S): at 11:26

## 2022-11-09 RX ADMIN — Medication 650 MILLIGRAM(S): at 06:24

## 2022-11-09 RX ADMIN — Medication 650 MILLIGRAM(S): at 12:20

## 2022-11-09 RX ADMIN — DONEPEZIL HYDROCHLORIDE 5 MILLIGRAM(S): 10 TABLET, FILM COATED ORAL at 21:50

## 2022-11-10 PROCEDURE — 99232 SBSQ HOSP IP/OBS MODERATE 35: CPT

## 2022-11-10 RX ADMIN — Medication 1 TABLET(S): at 12:12

## 2022-11-10 RX ADMIN — DONEPEZIL HYDROCHLORIDE 5 MILLIGRAM(S): 10 TABLET, FILM COATED ORAL at 22:03

## 2022-11-10 RX ADMIN — Medication 650 MILLIGRAM(S): at 07:03

## 2022-11-10 RX ADMIN — Medication 650 MILLIGRAM(S): at 16:05

## 2022-11-10 RX ADMIN — Medication 650 MILLIGRAM(S): at 23:04

## 2022-11-10 RX ADMIN — Medication 650 MILLIGRAM(S): at 16:58

## 2022-11-10 RX ADMIN — HALOPERIDOL DECANOATE 2 MILLIGRAM(S): 100 INJECTION INTRAMUSCULAR at 17:36

## 2022-11-10 RX ADMIN — Medication 50 MILLIGRAM(S): at 12:12

## 2022-11-10 RX ADMIN — Medication 650 MILLIGRAM(S): at 05:55

## 2022-11-10 RX ADMIN — Medication 650 MILLIGRAM(S): at 22:04

## 2022-11-10 RX ADMIN — HALOPERIDOL DECANOATE 2 MILLIGRAM(S): 100 INJECTION INTRAMUSCULAR at 05:57

## 2022-11-10 RX ADMIN — Medication 1000 UNIT(S): at 12:13

## 2022-11-10 NOTE — OCCUPATIONAL THERAPY INITIAL EVALUATION ADULT - LIVES WITH, PROFILE
As per daughter Ama, As per H&P: Pt lives with daughter in PH, 20 MONSTER, frequent falls. Attempted daughter Ama for social hx x2. As per H&P: Pt lives with daughter in PH, 20 MONSTER, frequent falls. Attempted daughter Ama for social hx x3.

## 2022-11-10 NOTE — OCCUPATIONAL THERAPY INITIAL EVALUATION ADULT - TRANSFER TRAINING, PT EVAL
GOAL: Pt will complete sit to stand transfer independently to improve functional abilities within 4 weeks. GOAL: Pt will complete toilet transfer independently within 4 weeks.

## 2022-11-10 NOTE — OCCUPATIONAL THERAPY INITIAL EVALUATION ADULT - PLANNED THERAPY INTERVENTIONS, OT EVAL
ADL retraining/balance training/bed mobility training/transfer training ADL retraining/balance training/bed mobility training/cognitive, visual perceptual/strengthening/transfer training

## 2022-11-10 NOTE — OCCUPATIONAL THERAPY INITIAL EVALUATION ADULT - PERTINENT HX OF CURRENT PROBLEM, REHAB EVAL
72F w/ PMHx of paranoid schizophrenia w/ tardive dyskinesia presenting after a fall down 20 steps resulting in sternal fracture. History obtained from daughter Ama as patient is unable to give accurate history. Per Ama, since patient was discharged from the hospital 5 weeks ago she has had worsening dysequilibrium resulting in more frequent falls. She states that her mother's condition has worsened drastically over the last weeks and is falling around once a day w/ dizziness and loss of balance. Yesterday, patient was left alone for a few seconds while patient's daughter went to get the car and she fell down 20 steps backwards and was found at the bottom of the stairs. She was taken to [facility] where imaging showed a mild displaced sternal fracture, minimally displaced and nondisplaced fractures in the anterior L second and third ribs and patient was transferred to [facility] for trauma eval. CT Head was negative for acute infarct or bleed. In the ED, patient was seen by trauma surgery, no indication for OR at this time. Of note, patient used to be on haldol however has not received any in 5 weeks since her discharge as outpt MD believed it may be contributing to her dysequilibrium per daughter. She saw Dr. Andres (neuro) approx a week ago who recommended starting patient on Ingrezza 40mg for a week which she feels has been helping her mother. At this time, patient's daughter is concerned that she can no longer take care of her mother  Chest Xray 11/5: No acute displaced rib fractures.

## 2022-11-11 LAB
HOMOCYSTEINE LEVEL: 10.9 UMOL/L — HIGH
METHYLMALONIC ACID LEVEL: 84 NMOL/L — LOW (ref 87–318)

## 2022-11-11 PROCEDURE — 99232 SBSQ HOSP IP/OBS MODERATE 35: CPT

## 2022-11-11 RX ADMIN — Medication 50 MILLIGRAM(S): at 12:20

## 2022-11-11 RX ADMIN — Medication 3 MILLIGRAM(S): at 22:37

## 2022-11-11 RX ADMIN — Medication 1 TABLET(S): at 12:19

## 2022-11-11 RX ADMIN — Medication 650 MILLIGRAM(S): at 06:23

## 2022-11-11 RX ADMIN — Medication 650 MILLIGRAM(S): at 07:20

## 2022-11-11 RX ADMIN — DONEPEZIL HYDROCHLORIDE 5 MILLIGRAM(S): 10 TABLET, FILM COATED ORAL at 21:17

## 2022-11-11 RX ADMIN — HALOPERIDOL DECANOATE 2 MILLIGRAM(S): 100 INJECTION INTRAMUSCULAR at 06:22

## 2022-11-11 RX ADMIN — HALOPERIDOL DECANOATE 2 MILLIGRAM(S): 100 INJECTION INTRAMUSCULAR at 17:17

## 2022-11-11 RX ADMIN — Medication 1000 UNIT(S): at 12:19

## 2022-11-12 LAB — SARS-COV-2 RNA SPEC QL NAA+PROBE: SIGNIFICANT CHANGE UP

## 2022-11-12 PROCEDURE — 99232 SBSQ HOSP IP/OBS MODERATE 35: CPT

## 2022-11-12 RX ORDER — LIDOCAINE 4 G/100G
1 CREAM TOPICAL EVERY 24 HOURS
Refills: 0 | Status: DISCONTINUED | OUTPATIENT
Start: 2022-11-12 | End: 2022-11-18

## 2022-11-12 RX ADMIN — Medication 1 TABLET(S): at 11:55

## 2022-11-12 RX ADMIN — DONEPEZIL HYDROCHLORIDE 5 MILLIGRAM(S): 10 TABLET, FILM COATED ORAL at 21:47

## 2022-11-12 RX ADMIN — Medication 50 MILLIGRAM(S): at 11:55

## 2022-11-12 RX ADMIN — HALOPERIDOL DECANOATE 2 MILLIGRAM(S): 100 INJECTION INTRAMUSCULAR at 17:12

## 2022-11-12 RX ADMIN — Medication 1000 UNIT(S): at 11:55

## 2022-11-12 RX ADMIN — HALOPERIDOL DECANOATE 2 MILLIGRAM(S): 100 INJECTION INTRAMUSCULAR at 06:09

## 2022-11-13 PROCEDURE — 99232 SBSQ HOSP IP/OBS MODERATE 35: CPT

## 2022-11-13 RX ADMIN — Medication 650 MILLIGRAM(S): at 11:53

## 2022-11-13 RX ADMIN — Medication 50 MILLIGRAM(S): at 11:54

## 2022-11-13 RX ADMIN — Medication 3 MILLIGRAM(S): at 21:08

## 2022-11-13 RX ADMIN — Medication 650 MILLIGRAM(S): at 22:00

## 2022-11-13 RX ADMIN — Medication 650 MILLIGRAM(S): at 21:07

## 2022-11-13 RX ADMIN — DONEPEZIL HYDROCHLORIDE 5 MILLIGRAM(S): 10 TABLET, FILM COATED ORAL at 21:08

## 2022-11-13 RX ADMIN — HALOPERIDOL DECANOATE 2 MILLIGRAM(S): 100 INJECTION INTRAMUSCULAR at 05:56

## 2022-11-13 RX ADMIN — Medication 1 TABLET(S): at 11:54

## 2022-11-13 RX ADMIN — HALOPERIDOL DECANOATE 2 MILLIGRAM(S): 100 INJECTION INTRAMUSCULAR at 17:05

## 2022-11-13 RX ADMIN — Medication 1000 UNIT(S): at 11:53

## 2022-11-13 RX ADMIN — Medication 650 MILLIGRAM(S): at 12:53

## 2022-11-13 NOTE — PROGRESS NOTE ADULT - NSPROGADDITIONALINFOA_GEN_ALL_CORE
Plan discussed with ACP Cherelle Quinones, DO  Available on Teams polo    Medically stable for discharge to [facility].
Plan discussed with NP Karen Quinones, DO  Available on Teams polo
Plan discussed with ACP Grao Quinones, DO  Available on Teams polo    Med ready for DC planning to [facility].
Plan discussed with NP Karoline Quinones, DO  Available on Teams polo    Medically stable for discharge to [facility].
Plan discussed with DEEPA Quinones, DO  Available on Teams polo    Med ready for DC planning to [facility]
Plan discussed with NP Mandi Quinones, DO  Available on Teams polo    Medically stable for discharge to [facility].
Plan d/w [facility] Wyman
Plan discussed with ADRIA Breaux, Dalton Quinones, DO  Available on Teams polo    Medically stable for discharge to [facility].

## 2022-11-14 PROCEDURE — 99232 SBSQ HOSP IP/OBS MODERATE 35: CPT

## 2022-11-14 RX ADMIN — Medication 50 MILLIGRAM(S): at 12:01

## 2022-11-14 RX ADMIN — Medication 650 MILLIGRAM(S): at 22:30

## 2022-11-14 RX ADMIN — Medication 1 TABLET(S): at 12:01

## 2022-11-14 RX ADMIN — Medication 650 MILLIGRAM(S): at 05:29

## 2022-11-14 RX ADMIN — Medication 650 MILLIGRAM(S): at 06:20

## 2022-11-14 RX ADMIN — HALOPERIDOL DECANOATE 2 MILLIGRAM(S): 100 INJECTION INTRAMUSCULAR at 05:31

## 2022-11-14 RX ADMIN — Medication 1000 UNIT(S): at 12:01

## 2022-11-14 RX ADMIN — DONEPEZIL HYDROCHLORIDE 5 MILLIGRAM(S): 10 TABLET, FILM COATED ORAL at 21:34

## 2022-11-14 RX ADMIN — HALOPERIDOL DECANOATE 2 MILLIGRAM(S): 100 INJECTION INTRAMUSCULAR at 17:59

## 2022-11-14 RX ADMIN — Medication 650 MILLIGRAM(S): at 21:32

## 2022-11-15 ENCOUNTER — APPOINTMENT (OUTPATIENT)
Dept: GASTROENTEROLOGY | Facility: CLINIC | Age: 72
End: 2022-11-15

## 2022-11-15 PROCEDURE — 99232 SBSQ HOSP IP/OBS MODERATE 35: CPT

## 2022-11-15 RX ADMIN — Medication 50 MILLIGRAM(S): at 12:05

## 2022-11-15 RX ADMIN — DONEPEZIL HYDROCHLORIDE 5 MILLIGRAM(S): 10 TABLET, FILM COATED ORAL at 23:13

## 2022-11-15 RX ADMIN — HALOPERIDOL DECANOATE 2 MILLIGRAM(S): 100 INJECTION INTRAMUSCULAR at 05:05

## 2022-11-15 RX ADMIN — Medication 650 MILLIGRAM(S): at 15:02

## 2022-11-15 RX ADMIN — LIDOCAINE 1 PATCH: 4 CREAM TOPICAL at 12:49

## 2022-11-15 RX ADMIN — LIDOCAINE 1 PATCH: 4 CREAM TOPICAL at 18:23

## 2022-11-15 RX ADMIN — Medication 1000 UNIT(S): at 12:05

## 2022-11-15 RX ADMIN — Medication 650 MILLIGRAM(S): at 05:03

## 2022-11-15 RX ADMIN — Medication 1 TABLET(S): at 12:05

## 2022-11-15 RX ADMIN — Medication 650 MILLIGRAM(S): at 16:02

## 2022-11-15 RX ADMIN — Medication 650 MILLIGRAM(S): at 05:43

## 2022-11-15 RX ADMIN — HALOPERIDOL DECANOATE 2 MILLIGRAM(S): 100 INJECTION INTRAMUSCULAR at 17:24

## 2022-11-16 ENCOUNTER — TRANSCRIPTION ENCOUNTER (OUTPATIENT)
Age: 72
End: 2022-11-16

## 2022-11-16 PROCEDURE — 99232 SBSQ HOSP IP/OBS MODERATE 35: CPT

## 2022-11-16 RX ADMIN — Medication 1000 UNIT(S): at 12:48

## 2022-11-16 RX ADMIN — Medication 1 TABLET(S): at 12:49

## 2022-11-16 RX ADMIN — Medication 50 MILLIGRAM(S): at 12:49

## 2022-11-16 RX ADMIN — HALOPERIDOL DECANOATE 2 MILLIGRAM(S): 100 INJECTION INTRAMUSCULAR at 05:35

## 2022-11-16 RX ADMIN — HALOPERIDOL DECANOATE 2 MILLIGRAM(S): 100 INJECTION INTRAMUSCULAR at 17:33

## 2022-11-16 RX ADMIN — DONEPEZIL HYDROCHLORIDE 5 MILLIGRAM(S): 10 TABLET, FILM COATED ORAL at 22:09

## 2022-11-16 RX ADMIN — LIDOCAINE 1 PATCH: 4 CREAM TOPICAL at 18:02

## 2022-11-16 RX ADMIN — LIDOCAINE 1 PATCH: 4 CREAM TOPICAL at 12:49

## 2022-11-16 RX ADMIN — LIDOCAINE 1 PATCH: 4 CREAM TOPICAL at 00:30

## 2022-11-16 NOTE — DISCHARGE NOTE PROVIDER - ATTENDING DISCHARGE PHYSICAL EXAMINATION:
T(C): 36.9 (11-18-22 @ 12:38), Max: 36.9 (11-18-22 @ 12:38)  HR: 57 (11-18-22 @ 12:38) (57 - 63)  BP: 115/78 (11-18-22 @ 12:38) (104/64 - 124/71)  RR: 18 (11-18-22 @ 12:38) (18 - 18)  SpO2: 97% (11-18-22 @ 12:38) (97% - 98%)  General: NAD, comfortable  Eyes: no conjunctival erythema  ENT: MMM  Neck: Neck supple, No JVD  Respiratory: CTA B/L, No wheezing, rales, rhonchi  CV: RRR no murmurs  Abdominal: Soft, NT, ND +BS  MSK: no focal weakness  Extremities: No edema, 2+ peripheral pulses  Neurology: A&Ox3, nonfocal, SHEPHERD x 4  Skin: No Rashes, Hematoma, Ecchymosis  Psych: Calm and appropriate

## 2022-11-16 NOTE — DISCHARGE NOTE PROVIDER - DETAILS OF MALNUTRITION DIAGNOSIS/DIAGNOSES
This patient has been assessed with a concern for Malnutrition and was treated during this hospitalization for the following Nutrition diagnosis/diagnoses:     -  11/18/2022: Severe protein-calorie malnutrition

## 2022-11-16 NOTE — DISCHARGE NOTE PROVIDER - NSDCCPCAREPLAN_GEN_ALL_CORE_FT
PRINCIPAL DISCHARGE DIAGNOSIS  Diagnosis: Sternal fracture  Assessment and Plan of Treatment: Noted on CT imaging from [facility]: Sternal manubrium fx, mildly displaced, w/evidence retrosternal hematoma. Anterior L 2nd rib fx minimally displaced, L 3rd rib fx nondisplaced w/o hemo/pneumothorax. Evaluated by Trauma here, no surgical intervention noted at this time  -Tylenol PRN for pain control  -Incentive spirometer ordered, pt amenable to using it.        SECONDARY DISCHARGE DIAGNOSES  Diagnosis: Tardive dyskinesia  Assessment and Plan of Treatment: Impression:  1)  Tardive dyskinesia 2/2 long standing antipyschotic use.    - On Ingrezza 80   - Vitamin B6 supplement      Diagnosis: Fracture of multiple ribs of right side  Assessment and Plan of Treatment:     Diagnosis: Disequilibrium  Assessment and Plan of Treatment:      PRINCIPAL DISCHARGE DIAGNOSIS  Diagnosis: Sternal fracture  Assessment and Plan of Treatment: Noted on CT imaging from J: Sternal manubrium fx, mildly displaced, w/evidence retrosternal hematoma. Anterior L 2nd rib fx minimally displaced, L 3rd rib fx nondisplaced w/o hemo/pneumothorax. Evaluated by Trauma here, no surgical intervention noted at this time  -Tylenol PRN for pain control  -Incentive spirometer ordered, pt amenable to using it.        SECONDARY DISCHARGE DIAGNOSES  Diagnosis: Fracture of multiple ribs of right side  Assessment and Plan of Treatment:     Diagnosis: Disequilibrium  Assessment and Plan of Treatment:     Diagnosis: Tardive dyskinesia  Assessment and Plan of Treatment: Impression:  1)  Tardive dyskinesia 2/2 long standing antipyschotic use.    - On Ingrezza 80   - Vitamin B6 supplement

## 2022-11-16 NOTE — DISCHARGE NOTE PROVIDER - NSDCMRMEDTOKEN_GEN_ALL_CORE_FT
Ingrezza 40 mg oral capsule: 1 cap(s) orally once a day  Multiple Vitamins oral tablet: 1 tab(s) orally once a day  Vitamin D3 25 mcg (1000 intl units) oral tablet: 1 tab(s) orally once a day   donepezil 5 mg oral tablet: 1 tab(s) orally once a day (at bedtime)  haloperidol 2 mg oral tablet: 1 tab(s) orally 2 times a day  Ingrezza 40 mg oral capsule: 1 cap(s) orally once a day  lidocaine 4% topical film: Apply topically to affected area once a day  melatonin 3 mg oral tablet: 1 tab(s) orally once a day (at bedtime), As needed, Insomnia  Multiple Vitamins oral tablet: 1 tab(s) orally once a day  OLANZapine 10 mg intramuscular injection: 2.5 milligram(s) intramuscular every 8 hours, As needed, Agitation  Vitamin D3 25 mcg (1000 intl units) oral tablet: 1 tab(s) orally once a day

## 2022-11-16 NOTE — PROGRESS NOTE ADULT - ASSESSMENT
72F w/ PMHx of paranoid schizophrenia w/ tardive dyskinesia, Vit D deficiency, pre-DM presenting after a fall down 20 steps resulting in sternal fracture likely 2/2 ongoing dysequilibrium.
72F w/ PMHx of paranoid schizophrenia w/ tardive dyskinesia, Vit D deficiency, pre-DM presenting after a fall down 20 steps resulting in sternal fracture likely 2/2 ongoing dysequilibrium.
72F w/ paranoid schizophrenia w/ tardive dyskinesia presenting after a fall down 20 steps resulting in sternal fracture. + frequent falls and dizziness with poor balaance.   Of note, patient used to be on haldol however has not received any in 5 weeks since her discharge   CT Head was negative for acute infarct or bleed. (other MRN)     Impression:  1)  Tardive dyskinesia 2/2 long standing antipyschotic use.      - On Ingrezza 80   - Vitamin B6 supplement  - Psych evaluation noted  -No further inpatient Neurologic workup at this time  -     
72F w/ paranoid schizophrenia w/ tardive dyskinesia presenting after a fall down 20 steps resulting in sternal fracture. + frequent falls and dizziness with poor balaance.   Of note, patient used to be on haldol however has not received any in 5 weeks since her discharge   CT Head was negative for acute infarct or bleed. (other MRN)     Impression:  1)  Tardive dyskinesia 2/2 long standing antipyschotic use.    2) fall 2/2 dysequilibrium     - On Ingrezza 40mg  can increase to 80mg.    - Vitamin B6 supplement  - Psych evaluation  - check orthostaitcs   - B12 folate TSH levels     
72F w/ PMHx of paranoid schizophrenia w/ tardive dyskinesia, Vit D deficiency, pre-DM presenting after a fall down 20 steps resulting in sternal fracture likely 2/2 ongoing dysequilibrium.

## 2022-11-16 NOTE — DISCHARGE NOTE PROVIDER - CARE PROVIDER_API CALL
Jer Andres)  Neurology; Neurophysiology  [address] [facility] [address], [address]  [place] [address]  Phone: [phone]  Fax: [phone]  Follow Up Time: 1 week    [facility]  Psychiatry & Neurology  [address] [place] [address]  Phone: [phone]  Fax: [phone]  Follow Up Time: 2 weeks

## 2022-11-16 NOTE — DISCHARGE NOTE PROVIDER - PROVIDER TOKENS
PROVIDER:[TOKEN:[[id number]],FOLLOWUP:[1 week]],PROVIDER:[TOKEN:[[id number]],FOLLOWUP:[2 weeks]]

## 2022-11-17 PROCEDURE — 99232 SBSQ HOSP IP/OBS MODERATE 35: CPT

## 2022-11-17 RX ADMIN — Medication 1 TABLET(S): at 12:35

## 2022-11-17 RX ADMIN — Medication 1000 UNIT(S): at 12:35

## 2022-11-17 RX ADMIN — HALOPERIDOL DECANOATE 2 MILLIGRAM(S): 100 INJECTION INTRAMUSCULAR at 05:28

## 2022-11-17 RX ADMIN — Medication 650 MILLIGRAM(S): at 12:36

## 2022-11-17 RX ADMIN — Medication 50 MILLIGRAM(S): at 12:35

## 2022-11-17 RX ADMIN — HALOPERIDOL DECANOATE 2 MILLIGRAM(S): 100 INJECTION INTRAMUSCULAR at 17:55

## 2022-11-17 RX ADMIN — DONEPEZIL HYDROCHLORIDE 5 MILLIGRAM(S): 10 TABLET, FILM COATED ORAL at 21:54

## 2022-11-17 RX ADMIN — LIDOCAINE 1 PATCH: 4 CREAM TOPICAL at 00:00

## 2022-11-17 RX ADMIN — Medication 3 MILLIGRAM(S): at 21:55

## 2022-11-17 RX ADMIN — Medication 650 MILLIGRAM(S): at 21:55

## 2022-11-17 RX ADMIN — Medication 650 MILLIGRAM(S): at 22:25

## 2022-11-18 ENCOUNTER — TRANSCRIPTION ENCOUNTER (OUTPATIENT)
Age: 72
End: 2022-11-18

## 2022-11-18 VITALS
HEART RATE: 62 BPM | TEMPERATURE: 98 F | SYSTOLIC BLOOD PRESSURE: 122 MMHG | RESPIRATION RATE: 18 BRPM | OXYGEN SATURATION: 96 % | DIASTOLIC BLOOD PRESSURE: 74 MMHG

## 2022-11-18 PROCEDURE — 86803 HEPATITIS C AB TEST: CPT

## 2022-11-18 PROCEDURE — U0005: CPT

## 2022-11-18 PROCEDURE — 87637 SARSCOV2&INF A&B&RSV AMP PRB: CPT

## 2022-11-18 PROCEDURE — 80061 LIPID PANEL: CPT

## 2022-11-18 PROCEDURE — 83090 ASSAY OF HOMOCYSTEINE: CPT

## 2022-11-18 PROCEDURE — 86901 BLOOD TYPING SEROLOGIC RH(D): CPT

## 2022-11-18 PROCEDURE — 96365 THER/PROPH/DIAG IV INF INIT: CPT

## 2022-11-18 PROCEDURE — 83036 HEMOGLOBIN GLYCOSYLATED A1C: CPT

## 2022-11-18 PROCEDURE — 80053 COMPREHEN METABOLIC PANEL: CPT

## 2022-11-18 PROCEDURE — 83605 ASSAY OF LACTIC ACID: CPT

## 2022-11-18 PROCEDURE — 36415 COLL VENOUS BLD VENIPUNCTURE: CPT

## 2022-11-18 PROCEDURE — 82435 ASSAY OF BLOOD CHLORIDE: CPT

## 2022-11-18 PROCEDURE — 97161 PT EVAL LOW COMPLEX 20 MIN: CPT

## 2022-11-18 PROCEDURE — 97165 OT EVAL LOW COMPLEX 30 MIN: CPT

## 2022-11-18 PROCEDURE — 71045 X-RAY EXAM CHEST 1 VIEW: CPT

## 2022-11-18 PROCEDURE — 85027 COMPLETE CBC AUTOMATED: CPT

## 2022-11-18 PROCEDURE — 86850 RBC ANTIBODY SCREEN: CPT

## 2022-11-18 PROCEDURE — 97530 THERAPEUTIC ACTIVITIES: CPT

## 2022-11-18 PROCEDURE — 84100 ASSAY OF PHOSPHORUS: CPT

## 2022-11-18 PROCEDURE — 82803 BLOOD GASES ANY COMBINATION: CPT

## 2022-11-18 PROCEDURE — 82947 ASSAY GLUCOSE BLOOD QUANT: CPT

## 2022-11-18 PROCEDURE — 84132 ASSAY OF SERUM POTASSIUM: CPT

## 2022-11-18 PROCEDURE — 85018 HEMOGLOBIN: CPT

## 2022-11-18 PROCEDURE — 82746 ASSAY OF FOLIC ACID SERUM: CPT

## 2022-11-18 PROCEDURE — 83690 ASSAY OF LIPASE: CPT

## 2022-11-18 PROCEDURE — 85730 THROMBOPLASTIN TIME PARTIAL: CPT

## 2022-11-18 PROCEDURE — 84484 ASSAY OF TROPONIN QUANT: CPT

## 2022-11-18 PROCEDURE — 99239 HOSP IP/OBS DSCHRG MGMT >30: CPT

## 2022-11-18 PROCEDURE — 97116 GAIT TRAINING THERAPY: CPT

## 2022-11-18 PROCEDURE — 82330 ASSAY OF CALCIUM: CPT

## 2022-11-18 PROCEDURE — 85014 HEMATOCRIT: CPT

## 2022-11-18 PROCEDURE — 83735 ASSAY OF MAGNESIUM: CPT

## 2022-11-18 PROCEDURE — 99285 EMERGENCY DEPT VISIT HI MDM: CPT

## 2022-11-18 PROCEDURE — 86900 BLOOD TYPING SEROLOGIC ABO: CPT

## 2022-11-18 PROCEDURE — 85610 PROTHROMBIN TIME: CPT

## 2022-11-18 PROCEDURE — 84443 ASSAY THYROID STIM HORMONE: CPT

## 2022-11-18 PROCEDURE — 84295 ASSAY OF SERUM SODIUM: CPT

## 2022-11-18 PROCEDURE — 83921 ORGANIC ACID SINGLE QUANT: CPT

## 2022-11-18 PROCEDURE — 97535 SELF CARE MNGMENT TRAINING: CPT

## 2022-11-18 PROCEDURE — U0003: CPT

## 2022-11-18 PROCEDURE — 82607 VITAMIN B-12: CPT

## 2022-11-18 PROCEDURE — 86780 TREPONEMA PALLIDUM: CPT

## 2022-11-18 PROCEDURE — 85025 COMPLETE CBC W/AUTO DIFF WBC: CPT

## 2022-11-18 RX ORDER — LANOLIN ALCOHOL/MO/W.PET/CERES
1 CREAM (GRAM) TOPICAL
Qty: 0 | Refills: 0 | DISCHARGE
Start: 2022-11-18

## 2022-11-18 RX ORDER — OLANZAPINE 15 MG/1
2.5 TABLET, FILM COATED ORAL
Qty: 0 | Refills: 0 | DISCHARGE
Start: 2022-11-18

## 2022-11-18 RX ORDER — HALOPERIDOL DECANOATE 100 MG/ML
1 INJECTION INTRAMUSCULAR
Qty: 0 | Refills: 0 | DISCHARGE
Start: 2022-11-18

## 2022-11-18 RX ORDER — DONEPEZIL HYDROCHLORIDE 10 MG/1
1 TABLET, FILM COATED ORAL
Qty: 0 | Refills: 0 | DISCHARGE
Start: 2022-11-18

## 2022-11-18 RX ORDER — LIDOCAINE 4 G/100G
1 CREAM TOPICAL
Qty: 0 | Refills: 0 | DISCHARGE
Start: 2022-11-18

## 2022-11-18 RX ADMIN — Medication 650 MILLIGRAM(S): at 12:13

## 2022-11-18 RX ADMIN — LIDOCAINE 1 PATCH: 4 CREAM TOPICAL at 12:15

## 2022-11-18 RX ADMIN — Medication 1000 UNIT(S): at 12:11

## 2022-11-18 RX ADMIN — HALOPERIDOL DECANOATE 2 MILLIGRAM(S): 100 INJECTION INTRAMUSCULAR at 17:25

## 2022-11-18 RX ADMIN — Medication 1 TABLET(S): at 12:12

## 2022-11-18 RX ADMIN — Medication 50 MILLIGRAM(S): at 12:13

## 2022-11-18 RX ADMIN — Medication 650 MILLIGRAM(S): at 13:00

## 2022-11-18 RX ADMIN — HALOPERIDOL DECANOATE 2 MILLIGRAM(S): 100 INJECTION INTRAMUSCULAR at 05:33

## 2022-11-18 NOTE — PROGRESS NOTE ADULT - PROBLEM SELECTOR PROBLEM 3
Schizophrenia, paranoid
Tardive dyskinesia
Schizophrenia, paranoid
Schizophrenia, paranoid
Tardive dyskinesia
Tardive dyskinesia
Schizophrenia, paranoid
Tardive dyskinesia
Schizophrenia, paranoid
Tardive dyskinesia

## 2022-11-18 NOTE — PROGRESS NOTE ADULT - PROBLEM SELECTOR PROBLEM 2
Dysequilibrium

## 2022-11-18 NOTE — PROGRESS NOTE ADULT - NUTRITIONAL ASSESSMENT
This patient has been assessed with a concern for Malnutrition and has been determined to have a diagnosis/diagnoses of Severe protein-calorie malnutrition.    This patient is being managed with:   Diet Easy to Chew-  Supplement Feeding Modality:  Oral  Ensure Pudding Cans or Servings Per Day:  2       Frequency:  Daily  Entered: Nov 18 2022  3:16PM

## 2022-11-18 NOTE — DIETITIAN INITIAL EVALUATION ADULT - OTHER INFO
No current height or weight found in chart for this admission or per review of [facility].  Bed weight taken today by RD - 112.8 lb, question accuracy given bed likely not zeroed.

## 2022-11-18 NOTE — PROGRESS NOTE ADULT - PROBLEM SELECTOR PROBLEM 4
Tardive dyskinesia
Schizophrenia, paranoid
Schizophrenia, paranoid
Tardive dyskinesia
Schizophrenia, paranoid
Tardive dyskinesia
Schizophrenia, paranoid
Schizophrenia, paranoid
Tardive dyskinesia
Tardive dyskinesia

## 2022-11-18 NOTE — DIETITIAN INITIAL EVALUATION ADULT - ADD RECOMMEND
1) change diet to easy to chew 2/2 tooth pain  2) recommend ensure pudding 2x/day (170 kcal, 4 g Pro/4oz)  3) Obtain current standing wt and height  4) Malnutrition sticker placed, RD to follow-up as per protocol   5) Encouragement at meals  6) continue MVI daily  7) Monitor PO intake, weight, labs, skin, GI status, diet

## 2022-11-18 NOTE — PROGRESS NOTE ADULT - PROBLEM SELECTOR PLAN 4
-Follows with Demetri  -was previously on haldol IM once a month but per daughter has not received any in last 5 weeks, goal was to try to wean pt off antipsychotics to see if they were contributing to her dysequilibirum  -Psych consulted, appreciate recs  -If pt were to become agitated overnight would first attempt re-orientation, if unsuccessful then would escalate to family member, otherwise olanzapine 2.5mg IM or haldol 0.5-2mg IV
-Followed by Neurology (Dr. Andres), recs appreciated  -cont Ingrezza, increased to 80mg qd, vitamin B6  -Titration of Ingrezza per Neurology.
-Followed by Neurology (Dr. Andres), recs appreciated  -cont Ingrezza, increased to 80mg qd, vitamin B6  -Titration of Ingrezza per Neurology.
-Follows with Dr. Steven Mosquera  -was previously on haldol IM once a month but per daughter has not received any in last 5 weeks, goal was to try to wean pt off antipsychotics to see if they were contributing to her dysequilibirum  -Psych consulted, appreciate recs  -started on haldol 2mg BID and 5mg donepezil qhs
-Followed by Neurology (Dr. Andres), recs appreciated  -cont Ingrezza, increased to 80mg qd, vitamin B6  -Titration of Ingrezza per Neurology.
-Follows with Dr. Steven Mosquera  -was previously on haldol IM once a month but per daughter has not received any in last 5 weeks, goal was to try to wean pt off antipsychotics to see if they were contributing to her dysequilibirum  -Psych consulted, appreciate recs  -started on haldol 2mg BID and 5mg donepezil qhs
-Followed by Neurology (Dr. Andres), recs appreciated  -cont Ingrezza, increased to 80mg qd, vitamin B6  -Titration of Ingrezza per Neurology.
-Followed by Neurology (Dr. Andres), recs appreciated  -cont Ingrezza, increased to 80mg qd, vitamin B6  -Titration of Ingrezza per Neurology.
-Follows with Dr. Steven Mosquera  -was previously on haldol IM once a month but per daughter has not received any in last 5 weeks, goal was to try to wean pt off antipsychotics to see if they were contributing to her dysequilibirum  -Psych consulted, appreciate recs  -started on haldol 2mg BID and 5mg donepezil qhs
-Follows with Dr. Steven Mosquera  -was previously on haldol IM once a month but per daughter has not received any in last 5 weeks, goal was to try to wean pt off antipsychotics to see if they were contributing to her dysequilibirum  -Psych consulted, appreciate recs  -started on haldol 2mg BID and 5mg donepezil qhs

## 2022-11-18 NOTE — DIETITIAN INITIAL EVALUATION ADULT - ENERGY INTAKE
Fair (50-75%) Currently, Pt reports appetite is good. Pt is c/o tooth pain which may be impacting ability to adequately tolerate regular diet. RN reports Pt consuming % of some meals. Pt not amenable to ensure drinks, will try pudding. No food preferences offered.

## 2022-11-18 NOTE — DIETITIAN INITIAL EVALUATION ADULT - SIGNS/SYMPTOMS
as evidenced by multiple fractures severe muscle depletion, mild fat loss, <75% energy intake for >1 month.

## 2022-11-18 NOTE — PROGRESS NOTE ADULT - PROBLEM SELECTOR PLAN 5
-c/w Vit D and MVI  -outpt follow up
c/w Vit D and MVI  -outpt follow up.
-c/w Vit D and MVI  -outpt follow up
-c/w Vit D and MVI  -outpt follow up

## 2022-11-18 NOTE — DIETITIAN INITIAL EVALUATION ADULT - NSFNSADHERENCEPTAFT_GEN_A_CORE
Daughter reports Pt was not following any specific therapeutic diet, but MD had told Pt to "cut down on sugars." Recent HbA1c 6.1% (11/6), indicating adequate glycemic control for age. Was not on any oral DM medications.

## 2022-11-18 NOTE — DIETITIAN INITIAL EVALUATION ADULT - ORAL INTAKE PTA/DIET HISTORY
Chart reviewed, events noted. Spoke with Pt and Pt's daughter (Ama [phone]). Pt reports no issues swallowing, some issues chewing tough foods. NKFA. Good appetite PTA, eating 3 meals/day - cooks for self/family cooks. Daughter reports appetite ok, sometimes fluctuates. UBW ~130 lbs, may have lost a few pounds. Daughter reports Pt's height is 5'8".

## 2022-11-18 NOTE — PROGRESS NOTE ADULT - PROBLEM SELECTOR PLAN 3
-Followed by Neurology (Dr. Andres), recs appreciated  -cont Ingrezza, increase to 80mg qd, vitamin B6  -Titration of Ingrezza per Neurology
Follows with Dr. Steven Mosquera  -was previously on haldol IM once a month but per daughter has not received any in last 5 weeks, goal was to try to wean pt off antipsychotics to see if they were contributing to her dysequilibirum  -Psych consulted, appreciate recs  -started on haldol 2mg BID and 5mg donepezil qhs.
-Followed by Neurology (Dr. Andres), recs appreciated  -cont Ingrezza, increased to 80mg qd, vitamin B6  -Titration of Ingrezza per Neurology
Follows with Dr. Steven Mosquera  -was previously on haldol IM once a month but per daughter has not received any in last 5 weeks, goal was to try to wean pt off antipsychotics to see if they were contributing to her dysequilibirum  -Psych consulted, appreciate recs  -started on haldol 2mg BID and 5mg donepezil qhs.
-Followed by Neurology (Dr. Andres), recs appreciated  -cont Ingrezza, increased to 80mg qd, vitamin B6  -Titration of Ingrezza per Neurology
-Followed by Neurology (Dr. Andres), recs appreciated  -cont Ingrezza, increased to 80mg qd, vitamin B6  -Titration of Ingrezza per Neurology
-Follows with Dr. Steven Mosquera  -was previously on haldol IM once a month but per daughter has not received any in last 5 weeks, goal was to try to wean pt off antipsychotics to see if they were contributing to her dysequilibirum  -Psych consulted, appreciate recs  -started on haldol 2mg BID and 5mg donepezil qhs.
Follows with Dr. Steven Mosquera  -was previously on haldol IM once a month but per daughter has not received any in last 5 weeks, goal was to try to wean pt off antipsychotics to see if they were contributing to her dysequilibirum  -Psych consulted, appreciate recs  -started on haldol 2mg BID and 5mg donepezil qhs.
-Followed by Neurology (Dr. Andres), recs appreciated  -cont Ingrezza, increased to 80mg qd, vitamin B6  -Titration of Ingrezza per Neurology
-Followed by Neurology (Dr. Andres), consulted via answering service, spoke to Dr. Andres on 11/5, start on B6 and restart ingrezza  -Patient was recently started on Ingrezza 40mg to be taken for one week before uptitrating to 80mg, medication brought in by daughter today and can be resumed  -Titration of Ingrezza per Neurology
-Followed by Neurology (Dr. Andres), recs appreciated  -cont Ingrezza, increase to 80mg qd, vitamin B6  -Titration of Ingrezza per Neurology
-Followed by Neurology (Dr. Andres), recs appreciated  -cont Ingrezza, increase to 80mg qd, vitamin B6  -Titration of Ingrezza per Neurology
Follows with Dr. Steven Mosquera  -was previously on haldol IM once a month but per daughter has not received any in last 5 weeks, goal was to try to wean pt off antipsychotics to see if they were contributing to her dysequilibirum  -Psych consulted, appreciate recs  -started on haldol 2mg BID and 5mg donepezil qhs.

## 2022-11-18 NOTE — PROGRESS NOTE ADULT - REASON FOR ADMISSION
Fall, dysequilibrium

## 2022-11-18 NOTE — PROGRESS NOTE ADULT - PROBLEM SELECTOR PROBLEM 5
Vitamin D deficiency

## 2022-11-18 NOTE — PROGRESS NOTE ADULT - PROBLEM SELECTOR PLAN 2
-Ongoing and worsening over the last 5 weeks, per daughter patient has been falling approx daily  -Likely 2/2 underlying psychiatric conditions, head CT showed no evidence of hydrocephalus making NPH less likely. Also could be possible contribution by medications, will optimize with neuro and psychiatry  -lower suspicion for neuropathic etiology  -PT evaluation w/ orthostatic vitals, though standing SBP remains >100
-Ongoing and worsening over the last 5 weeks, per daughter patient has been falling approx daily  -Likely 2/2 underlying psychiatric conditions, head CT showed no evidence of hydrocephalus making NPH less likely. Also could be possible contribution by medications, will optimize with neuro and psychiatry  -lower suspicion for neuropathic etiology  -PT evaluation w/ orthostatic vitals, though standing SBP remains >100
-Ongoing and worsening over the last 5 weeks, per daughter patient has been falling approx daily  -head CT showed no evidence of hydrocephalus making NPH less likely. Also could be possible contribution by medications, will optimize with neuro and psychiatry  -lower suspicion for neuropathic etiology  -PT evaluation w/ orthostatic vitals, though standing SBP remains >100
-Noted on CT imaging from [facility]: Sternal manubrium fx, mildly displaced, w/evidence retrosternal hematoma. Anterior L 2nd rib fx minimally displaced, L 3rd rib fx nondisplaced w/o hemo/pneumothorax. Evaluated by Trauma here, no surgical intervention noted at this time  -Tylenol PRN for pain control  -Incentive spirometer ordered, pt amenable to using it.
-Ongoing and worsening over the last 5 weeks, per daughter patient has been falling approx daily  -Likely 2/2 underlying psychiatric conditions, head CT showed no evidence of hydrocephalus making NPH less likely. Also could be possible contribution by medications, will optimize with neuro and psychiatry  -lower suspicion for neuropathic etiology  -PT evaluation w/ orthostatic vitals, though standing SBP remains >100
Noted on CT imaging from [facility]: Sternal manubrium fx, mildly displaced, w/evidence retrosternal hematoma. Anterior L 2nd rib fx minimally displaced, L 3rd rib fx nondisplaced w/o hemo/pneumothorax. Evaluated by Trauma here, no surgical intervention noted at this time  -Tylenol PRN for pain control  -Incentive spirometer ordered, pt amenable to using it.
-Ongoing and worsening over the last 5 weeks, per daughter patient has been falling approx daily  -head CT showed no evidence of hydrocephalus making NPH less likely. Also could be possible contribution by medications, will optimize with neuro and psychiatry  -lower suspicion for neuropathic etiology  -PT evaluation w/ orthostatic vitals, though standing SBP remains >100
-Noted on CT imaging from [facility]: Sternal manubrium fx, mildly displaced, w/evidence retrosternal hematoma. Anterior L 2nd rib fx minimally displaced, L 3rd rib fx nondisplaced w/o hemo/pneumothorax. Evaluated by Trauma here, no surgical intervention noted at this time  -Tylenol PRN for pain control  -Incentive spirometer ordered, pt amenable to using it.
-Noted on CT imaging from [facility]: Sternal manubrium fx, mildly displaced, w/evidence retrosternal hematoma. Anterior L 2nd rib fx minimally displaced, L 3rd rib fx nondisplaced w/o hemo/pneumothorax. Evaluated by Trauma here, no surgical intervention noted at this time  -Tylenol PRN for pain control  -Incentive spirometer ordered, pt amenable to using it.
-Ongoing and worsening over the last 5 weeks, per daughter patient has been falling approx daily  -Likely 2/2 underlying psychiatric conditions, head CT showed no evidence of hydrocephalus making NPH less likely. Also could be possible contribution by medications, will optimize with neuro and psychiatry  -lower suspicion for neuropathic etiology as patient has preserved sensation on her b/l lower extremities  -PT evaluation w/ orthostatic vitals
-Ongoing and worsening over the last 5 weeks, per daughter patient has been falling approx daily  -head CT showed no evidence of hydrocephalus making NPH less likely. Also could be possible contribution by medications, will optimize with neuro and psychiatry  -lower suspicion for neuropathic etiology  -PT evaluation w/ orthostatic vitals, though standing SBP remains >100
-Noted on CT imaging from [facility]: Sternal manubrium fx, mildly displaced, w/evidence retrosternal hematoma. Anterior L 2nd rib fx minimally displaced, L 3rd rib fx nondisplaced w/o hemo/pneumothorax. Evaluated by Trauma here, no surgical intervention noted at this time  -Tylenol PRN for pain control  -Incentive spirometer ordered, pt amenable to using it.
-Ongoing and worsening over the last 5 weeks, per daughter patient has been falling approx daily  -head CT showed no evidence of hydrocephalus making NPH less likely. Also could be possible contribution by medications, will optimize with neuro and psychiatry  -lower suspicion for neuropathic etiology  -PT evaluation w/ orthostatic vitals, though standing SBP remains >100

## 2022-11-18 NOTE — PROGRESS NOTE ADULT - PROBLEM SELECTOR PROBLEM 1
Sternal fracture

## 2022-11-18 NOTE — PROVIDER CONTACT NOTE (OTHER) - ASSESSMENT
Pt complained of tooth pain and ache. VSS on RA. PRN Tylenol was given as ordered. Medications were crushed.

## 2022-11-18 NOTE — PROGRESS NOTE ADULT - PROBLEM SELECTOR PLAN 6
PT recs PIETRO, per psych no need for inpatient psych admission, pending placement, awaiting authorization.
holding chemical dvt i/s/o fall w/ fracture; SCDs  Dispo: PT recs PIETRO, per psych no need for inpatient psych admission, pending placement, awaiting authorization
Diet: Regular  DVT: holding chemical dvt i/s/o fall w/ fracture; SCDs  Dispo: PT recs PIETRO, per psych no need for inpatient psych admission
PT recs PIETRO, per psych no need for inpatient psych admission, pending placement, awaiting authorization.
Diet: Regular  DVT: holding chemical dvt i/s/o fall w/ fracture; SCDs  Dispo: PT recs PIETRO, per psych no need for inpatient psych admission
Diet: Regular  DVT: holding chemical dvt i/s/o fall w/ fracture; SCDs  Dispo: PT recs PIETRO, per psych no need for inpatient psych admission
Diet: Regular  DVT: holding chemical dvt i/s/o fall w/ fracture; SCDs  Dispo: PT recs PIETRO, per psych no need for inpatient psych admission, pending placement
Diet: Regular  DVT: holding chemical dvt i/s/o fall w/ fracture; SCDs  Dispo: pending PT eval but also requires Demetri admission
Diet: Regular  DVT: holding chemical dvt i/s/o fall w/ fracture; SCDs  Dispo: PT recs PIETRO, per psych no need for inpatient psych admission
PT recs PIETRO, per psych no need for inpatient psych admission, pending placement, awaiting authorization.
Diet: Regular  DVT: holding chemical dvt i/s/o fall w/ fracture; SCDs  Dispo: PT recs PIETRO, per psych no need for inpatient psych admission

## 2022-11-18 NOTE — DISCHARGE NOTE NURSING/CASE MANAGEMENT/SOCIAL WORK - PATIENT PORTAL LINK FT
You can access the FollowMyHealth Patient Portal offered by [facility] by registering at the following website: [email or web address]. By joining [facility]’s FollowMyHealth portal, you will also be able to view your health information using other applications (apps) compatible with our system.

## 2022-11-18 NOTE — PROGRESS NOTE ADULT - PROBLEM SELECTOR PLAN 1
-Noted on CT imaging from [facility]: Sternal manubrium fx, mildly displaced, w/evidence retrosternal hematoma. Anterior L 2nd rib fx minimally displaced, L 3rd rib fx nondisplaced w/o hemo/pneumothorax. Evaluated by Trauma here, no surgical intervention noted at this time  -Tylenol PRN for pain control  -Incentive spirometer ordered, pt amenable to using it.
Noted on CT imaging from [facility]: Sternal manubrium fx, mildly displaced, w/evidence retrosternal hematoma. Anterior L 2nd rib fx minimally displaced, L 3rd rib fx nondisplaced w/o hemo/pneumothorax. Evaluated by Trauma here, no surgical intervention noted at this time  -Tylenol PRN for pain control  -Incentive spirometer ordered, pt amenable to using it.
-Noted on CT imaging from [facility]: Sternal manubrium fx, mildly displaced, w/evidence retrosternal hematoma. Anterior L 2nd rib fx minimally displaced, L 3rd rib fx nondisplaced w/o hemo/pneumothorax. Evaluated by Trauma here, no surgical intervention noted at this time  -Tylenol PRN for pain control  -Incentive spirometer ordered, pt amenable to using it
Noted on CT imaging from [facility]: Sternal manubrium fx, mildly displaced, w/evidence retrosternal hematoma. Anterior L 2nd rib fx minimally displaced, L 3rd rib fx nondisplaced w/o hemo/pneumothorax. Evaluated by Trauma here, no surgical intervention noted at this time  -Tylenol PRN for pain control  -Incentive spirometer ordered, pt amenable to using it.
-Noted on CT imaging from [facility]: Sternal manubrium fx, mildly displaced, w/evidence retrosternal hematoma. Anterior L 2nd rib fx minimally displaced, L 3rd rib fx nondisplaced w/o hemo/pneumothorax. Evaluated by Trauma here, no surgical intervention noted at this time  -Tylenol PRN for pain control  -Incentive spirometer ordered, pt amenable to using it
Noted on CT imaging from [facility]: Sternal manubrium fx, mildly displaced, w/evidence retrosternal hematoma. Anterior L 2nd rib fx minimally displaced, L 3rd rib fx nondisplaced w/o hemo/pneumothorax. Evaluated by Trauma here, no surgical intervention noted at this time  -Tylenol PRN for pain control  -Incentive spirometer ordered, pt amenable to using it.
-Noted on CT imaging from [facility]: Sternal manubrium fx, mildly displaced, w/evidence retrosternal hematoma. Anterior L 2nd rib fx minimally displaced, L 3rd rib fx nondisplaced w/o hemo/pneumothorax. Evaluated by Trauma here, no surgical intervention noted at this time  -Tylenol PRN for pain control  -Incentive spirometer ordered, pt amenable to using it
Noted on CT imaging from [facility]: Sternal manubrium fx, mildly displaced, w/evidence retrosternal hematoma. Anterior L 2nd rib fx minimally displaced, L 3rd rib fx nondisplaced w/o hemo/pneumothorax. Evaluated by Trauma here, no surgical intervention noted at this time  -Tylenol PRN for pain control  -Incentive spirometer ordered, pt amenable to using it.
-Noted on CT imaging from [facility]: Sternal manubrium fx, mildly displaced, w//p evidence retrosternal hematoma. Anterior L 2nd rib fx minimally displaced, L 3rd rib fx nondisplaced w/o hemo/pneumothorax. Evaluated by Trauma here, no surgical intervention noted at this time  -Tylenol PRN for pain control  -Incentive spirometer ordered, pt amenable to using it

## 2022-11-18 NOTE — DIETITIAN INITIAL EVALUATION ADULT - PERTINENT MEDS FT
MEDICATIONS  (STANDING):  cholecalciferol 1000 Unit(s) Oral daily  donepezil 5 milliGRAM(s) Oral at bedtime  haloperidol     Tablet 2 milliGRAM(s) Oral two times a day  INGREZZA 80 milliGRAM(s) 80 milliGRAM(s) Oral daily  lidocaine   4% Patch 1 Patch Transdermal every 24 hours  multivitamin 1 Tablet(s) Oral daily  pyridoxine 50 milliGRAM(s) Oral daily    MEDICATIONS  (PRN):  acetaminophen     Tablet .. 650 milliGRAM(s) Oral every 6 hours PRN Temp greater or equal to 38C (100.4F), Mild Pain (1 - 3)  melatonin 3 milliGRAM(s) Oral at bedtime PRN Insomnia  OLANZapine Injectable 2.5 milliGRAM(s) IntraMuscular every 8 hours PRN Agitation

## 2022-11-18 NOTE — PROGRESS NOTE ADULT - SUBJECTIVE AND OBJECTIVE BOX
[facility] Division of Hospital Medicine  Vivian Quinones DO  Available on Teams Jian    Patient is a 72y old  Female who presents with a chief complaint of Fall, dysequilibrium (08 Nov 2022 15:48)      SUBJECTIVE / OVERNIGHT EVENTS: none. patient has no complaints. Yesterday orthostatics were negative.  ADDITIONAL REVIEW OF SYSTEMS: negative    MEDICATIONS  (STANDING):  cholecalciferol 1000 Unit(s) Oral daily  donepezil 5 milliGRAM(s) Oral at bedtime  haloperidol     Tablet 2 milliGRAM(s) Oral two times a day  INGREZZA 80 milliGRAM(s) 80 milliGRAM(s) Oral daily  multivitamin 1 Tablet(s) Oral daily  pyridoxine 50 milliGRAM(s) Oral daily    MEDICATIONS  (PRN):  acetaminophen     Tablet .. 650 milliGRAM(s) Oral every 6 hours PRN Temp greater or equal to 38C (100.4F), Mild Pain (1 - 3)  melatonin 3 milliGRAM(s) Oral at bedtime PRN Insomnia  OLANZapine Injectable 2.5 milliGRAM(s) IntraMuscular every 8 hours PRN Agitation      CAPILLARY BLOOD GLUCOSE        I&O's Summary    08 Nov 2022 07:01  -  09 Nov 2022 07:00  --------------------------------------------------------  IN: 830 mL / OUT: 1300 mL / NET: -470 mL    09 Nov 2022 07:01  -  09 Nov 2022 14:48  --------------------------------------------------------  IN: 540 mL / OUT: 0 mL / NET: 540 mL        PHYSICAL EXAM:  Vital Signs Last 24 Hrs  T(C): 37 (09 Nov 2022 13:00), Max: 37 (09 Nov 2022 13:00)  T(F): 98.6 (09 Nov 2022 13:00), Max: 98.6 (09 Nov 2022 13:00)  HR: 62 (09 Nov 2022 13:00) (58 - 67)  BP: 120/72 (09 Nov 2022 13:00) (111/67 - 147/82)  BP(mean): --  RR: 18 (09 Nov 2022 13:00) (18 - 18)  SpO2: 97% (09 Nov 2022 13:00) (96% - 100%)    Parameters below as of 09 Nov 2022 13:00  Patient On (Oxygen Delivery Method): room air        CONSTITUTIONAL: Well-groomed, in no apparent distress  EYES: No conjunctival or scleral injection, non-icteric; PERRLA and symmetric  ENMT: No external nasal lesions; no pharyngeal injection or exudates, oral mucosa with moist membranes.   NECK: Trachea midline without palpable neck mass; thyroid not enlarged and non-tender  RESPIRATORY: Breathing comfortably; lungs CTA without wheeze/rhonchi/rales  CARDIOVASCULAR: +S1S2, RRR, no M/G/R; pedal pulses full and symmetric; no lower extremity edema  GASTROINTESTINAL: No palpable masses or tenderness, +BS throughout, no rebound/guarding; no hepatosplenomegaly; no hernia palpated  MUSCULOSKELETAL: no digital clubbing or cyanosis; no paraspinal tenderness; normal strength and tone of extremities  SKIN: No rashes or ulcers noted; no subcutaneous nodules or induration palpable  NEUROLOGIC: CN II-XII intact; sensation intact in LEs b/l to light touch. Exhibits repetitive mouth movements  PSYCHIATRIC: A+O x 3; mood and affect appropriate    LABS:                        11.6   3.98  )-----------( 277      ( 08 Nov 2022 07:08 )             36.9 
[facility] Division of Hospital Medicine  Vivian Quinones DO  Available on Teams Jian    Patient is a 72y old  Female who presents with a chief complaint of Fall, dysequilibrium (10 Nov 2022 16:26)      SUBJECTIVE / OVERNIGHT EVENTS: none. Patient seen eating breakfast, has no complaints.  ADDITIONAL REVIEW OF SYSTEMS: negative    MEDICATIONS  (STANDING):  cholecalciferol 1000 Unit(s) Oral daily  donepezil 5 milliGRAM(s) Oral at bedtime  haloperidol     Tablet 2 milliGRAM(s) Oral two times a day  INGREZZA 80 milliGRAM(s) 80 milliGRAM(s) Oral daily  multivitamin 1 Tablet(s) Oral daily  pyridoxine 50 milliGRAM(s) Oral daily    MEDICATIONS  (PRN):  acetaminophen     Tablet .. 650 milliGRAM(s) Oral every 6 hours PRN Temp greater or equal to 38C (100.4F), Mild Pain (1 - 3)  melatonin 3 milliGRAM(s) Oral at bedtime PRN Insomnia  OLANZapine Injectable 2.5 milliGRAM(s) IntraMuscular every 8 hours PRN Agitation      CAPILLARY BLOOD GLUCOSE        I&O's Summary    10 Nov 2022 07:01  -  11 Nov 2022 07:00  --------------------------------------------------------  IN: 240 mL / OUT: 500 mL / NET: -260 mL        PHYSICAL EXAM:  Vital Signs Last 24 Hrs  T(C): 37.1 (11 Nov 2022 09:27), Max: 37.1 (11 Nov 2022 09:27)  T(F): 98.8 (11 Nov 2022 09:27), Max: 98.8 (11 Nov 2022 09:27)  HR: 89 (11 Nov 2022 09:27) (57 - 89)  BP: 124/73 (11 Nov 2022 09:27) (114/72 - 132/62)  BP(mean): --  RR: 18 (11 Nov 2022 09:27) (18 - 18)  SpO2: 97% (11 Nov 2022 09:27) (95% - 98%)    Parameters below as of 11 Nov 2022 09:27  Patient On (Oxygen Delivery Method): room air      CONSTITUTIONAL: Well-groomed, in no apparent distress  EYES: No conjunctival or scleral injection, non-icteric; PERRLA and symmetric  ENMT: No external nasal lesions; no pharyngeal injection or exudates, oral mucosa with moist membranes.   NECK: Trachea midline without palpable neck mass; thyroid not enlarged and non-tender  RESPIRATORY: Breathing comfortably; lungs CTA without wheeze/rhonchi/rales  CARDIOVASCULAR: +S1S2, RRR, no M/G/R; pedal pulses full and symmetric; no lower extremity edema  GASTROINTESTINAL: No palpable masses or tenderness, +BS throughout, no rebound/guarding; no hepatosplenomegaly; no hernia palpated  MUSCULOSKELETAL: no digital clubbing or cyanosis; no paraspinal tenderness; normal strength and tone of extremities  SKIN: No rashes or ulcers noted; no subcutaneous nodules or induration palpable  NEUROLOGIC: CN II-XII intact; sensation intact in LEs b/l to light touch. Exhibits repetitive mouth movements  PSYCHIATRIC: A+O x 3; mood and affect appropriate  
  MEDICATIONS:    acetaminophen     Tablet .. 650 milliGRAM(s) Oral every 6 hours PRN  cholecalciferol 1000 Unit(s) Oral daily  donepezil 5 milliGRAM(s) Oral at bedtime  haloperidol     Tablet 2 milliGRAM(s) Oral two times a day  INGREZZA 80 milliGRAM(s) 80 milliGRAM(s) Oral daily  melatonin 3 milliGRAM(s) Oral at bedtime PRN  multivitamin 1 Tablet(s) Oral daily  OLANZapine Injectable 2.5 milliGRAM(s) IntraMuscular every 8 hours PRN  pyridoxine 50 milliGRAM(s) Oral daily      LABS:            CAPILLARY BLOOD GLUCOSE            I&O's Summary    09 Nov 2022 07:01  -  10 Nov 2022 07:00  --------------------------------------------------------  IN: 1215 mL / OUT: 500 mL / NET: 715 mL      Vital Signs Last 24 Hrs  T(C): 36.3 (10 Nov 2022 08:55), Max: 37 (09 Nov 2022 13:00)  T(F): 97.4 (10 Nov 2022 08:55), Max: 98.6 (09 Nov 2022 13:00)  HR: 58 (10 Nov 2022 08:55) (54 - 62)  BP: 130/74 (10 Nov 2022 08:55) (119/66 - 130/74)  BP(mean): --  RR: 18 (10 Nov 2022 08:55) (18 - 18)  SpO2: 96% (10 Nov 2022 08:55) (96% - 97%)    Parameters below as of 10 Nov 2022 08:55  Patient On (Oxygen Delivery Method): room air          Neurological Exam:  Mental Status: Awake, alert and oriented x 1-2.  Able to follow simple  verbal commands. Able to name and repeat. fluent speech. No obvious aphasia   mild dsyarhtria  Cranial Nerves: PERRL, EOMI, VFFC, sensation V1-V3 intact,  no obvious facial asymmetry, equal elevation of palate, scm/trap 5/5, tongue is midline on protrusion. no obvious papilledema on fundoscopic exam. hearing is grossly intact.  + Tardive movements   Motor: SHEPHERD equally but tardive movements noted  dyskinesias  Sensation: Intact to light touch and pinprick throughout   Coordination:  trouble given involuntary movements   Gait: unsteady     Data/Labs/Imaging which I personally reviewed.     
Patient seen and examined this am. No new events    MEDICATIONS:    acetaminophen     Tablet .. 650 milliGRAM(s) Oral every 6 hours PRN  cholecalciferol 1000 Unit(s) Oral daily  donepezil 5 milliGRAM(s) Oral at bedtime  haloperidol     Tablet 2 milliGRAM(s) Oral two times a day  INGREZZA 40 milliGRAM(s) 40 milliGRAM(s) Oral daily  melatonin 3 milliGRAM(s) Oral at bedtime PRN  multivitamin 1 Tablet(s) Oral daily  OLANZapine Injectable 2.5 milliGRAM(s) IntraMuscular every 8 hours PRN  pyridoxine 50 milliGRAM(s) Oral daily      LABS:                          11.6   3.30  )-----------( 145      ( 06 Nov 2022 06:03 )             37.5     11-06    142  |  105  |  12  ----------------------------<  77  3.8   |  28  |  0.80    Ca    9.0      06 Nov 2022 06:01  Phos  2.9     11-06  Mg     2.1     11-06    TPro  6.9  /  Alb  3.5  /  TBili  0.5  /  DBili  x   /  AST  16  /  ALT  14  /  AlkPhos  93  11-06    CAPILLARY BLOOD GLUCOSE            I&O's Summary    06 Nov 2022 07:01  -  07 Nov 2022 07:00  --------------------------------------------------------  IN: 840 mL / OUT: 650 mL / NET: 190 mL    07 Nov 2022 07:01  -  07 Nov 2022 12:51  --------------------------------------------------------  IN: 240 mL / OUT: 0 mL / NET: 240 mL      Vital Signs Last 24 Hrs  T(C): 36.7 (07 Nov 2022 12:00), Max: 37.1 (06 Nov 2022 23:44)  T(F): 98 (07 Nov 2022 12:00), Max: 98.8 (07 Nov 2022 08:53)  HR: 68 (07 Nov 2022 12:00) (60 - 68)  BP: 115/69 (07 Nov 2022 12:00) (115/69 - 135/72)  BP(mean): --  RR: 18 (07 Nov 2022 12:00) (18 - 18)  SpO2: 97% (07 Nov 2022 12:00) (97% - 100%)    Parameters below as of 07 Nov 2022 12:00  Patient On (Oxygen Delivery Method): room air        Neurological Exam:  Mental Status: Awake, alert and oriented x 1-2.  Able to follow simple  verbal commands. Able to name and repeat. fluent speech. No obvious aphasia   mild dsyarhtria  Cranial Nerves: PERRL, EOMI, VFFC, sensation V1-V3 intact,  no obvious facial asymmetry, equal elevation of palate, scm/trap 5/5, tongue is midline on protrusion. no obvious papilledema on fundoscopic exam. hearing is grossly intact.  + Tardive movements   Motor: SHEPHERD equally but tardive movements noted  dyskinesias  Sensation: Intact to light touch and pinprick throughout   Coordination:  trouble given involuntary movements   Gait: unsteady     Data/Labs/Imaging which I personally reviewed.     
[facility] Division of Hospital Medicine  Leonardo Patterson MD  Available via MS Teams      SUBJECTIVE / OVERNIGHT EVENTS:  NO acute events overnight    ADDITIONAL REVIEW OF SYSTEMS:  REVIEW OF SYSTEMS:    CONSTITUTIONAL: No weakness, fevers or chills  EYES: no blurry vision or eye pain.   ENT: No throat pain. No dysphagia.    NECK: No pain or stiffness  RESPIRATORY: No cough, wheezing, hemoptysis; No shortness of breath  CARDIOVASCULAR: No chest pain or palpitations.  GASTROINTESTINAL: No abdominal pain. No nausea or vomiting; No diarrhea or constipation. No melena or hematochezia.  GENITOURINARY: No dysuria, frequency or hematuria  NEUROLOGICAL: No numbness or weakness. No dizziness or falls.   SKIN: No itching, burning, rashes, or lesions.   LYMPHATIC: No masses or swelling.   All other review of systems is negative unless indicated above.  MEDICATIONS  (STANDING):  cholecalciferol 1000 Unit(s) Oral daily  donepezil 5 milliGRAM(s) Oral at bedtime  haloperidol     Tablet 2 milliGRAM(s) Oral two times a day  INGREZZA 80 milliGRAM(s) 80 milliGRAM(s) Oral daily  lidocaine   4% Patch 1 Patch Transdermal every 24 hours  multivitamin 1 Tablet(s) Oral daily  pyridoxine 50 milliGRAM(s) Oral daily    MEDICATIONS  (PRN):  acetaminophen     Tablet .. 650 milliGRAM(s) Oral every 6 hours PRN Temp greater or equal to 38C (100.4F), Mild Pain (1 - 3)  melatonin 3 milliGRAM(s) Oral at bedtime PRN Insomnia  OLANZapine Injectable 2.5 milliGRAM(s) IntraMuscular every 8 hours PRN Agitation      I&O's Summary    16 Nov 2022 07:01  -  17 Nov 2022 07:00  --------------------------------------------------------  IN: 1200 mL / OUT: 900 mL / NET: 300 mL    17 Nov 2022 07:01  -  17 Nov 2022 15:03  --------------------------------------------------------  IN: 600 mL / OUT: 250 mL / NET: 350 mL        PHYSICAL EXAM:  Vital Signs Last 24 Hrs  T(C): 36.8 (17 Nov 2022 13:40), Max: 37 (16 Nov 2022 16:20)  T(F): 98.2 (17 Nov 2022 13:40), Max: 98.6 (16 Nov 2022 16:20)  HR: 58 (17 Nov 2022 13:40) (56 - 78)  BP: 110/72 (17 Nov 2022 13:40) (106/64 - 122/72)  BP(mean): --  RR: 18 (17 Nov 2022 13:40) (18 - 18)  SpO2: 99% (17 Nov 2022 13:40) (97% - 99%)    Parameters below as of 17 Nov 2022 13:40  Patient On (Oxygen Delivery Method): room air    CONSTITUTIONAL: NAD, well-developed, well-groomed  EYES: PERRLA; conjunctiva and sclera clear  ENMT: Moist oral mucosa, no pharyngeal injection or exudates; normal dentition  NECK: Supple, no palpable masses; no thyromegaly  RESPIRATORY: Normal respiratory effort; lungs are clear to auscultation bilaterally  CARDIOVASCULAR: Regular rate and rhythm, normal S1 and S2, no murmur/rub/gallop; No lower extremity edema; Peripheral pulses are 2+ bilaterally  ABDOMEN: Nontender to palpation, normoactive bowel sounds, no rebound/guarding; No hepatosplenomegaly  MUSCULOSKELETAL:  Normal gait; no clubbing or cyanosis of digits; no joint swelling or tenderness to palpation  PSYCH: A+O to person, place; affect blunted  NEUROLOGY: CN 2-12 are intact and symmetric; no gross sensory deficits   SKIN: No rashes; no palpable lesions    LABS:                    COVID-19 PCR: NotDetec (12 Nov 2022 08:00)      RADIOLOGY & ADDITIONAL TESTS:  ekg, labs, micro imaging personally reviewed      COORDINATION OF CARE:  care discussed and coordinated with consultants.    
[facility] Division of Hospital Medicine  Vivian Quinones DO  Available on Teams Jian    Patient is a 72y old  Female who presents with a chief complaint of Fall, dysequilibrium (11 Nov 2022 13:12)      SUBJECTIVE / OVERNIGHT EVENTS: patient was moved rooms again, she is not listening to instructions to not get out of bed on her own. She went above the bed rails and walked out to the doorway, was very unsteady/unstable. She almost fell but towards the wall and I helped her get steady to sit in the bed again.   ADDITIONAL REVIEW OF SYSTEMS: negative    MEDICATIONS  (STANDING):  cholecalciferol 1000 Unit(s) Oral daily  donepezil 5 milliGRAM(s) Oral at bedtime  haloperidol     Tablet 2 milliGRAM(s) Oral two times a day  INGREZZA 80 milliGRAM(s) 80 milliGRAM(s) Oral daily  multivitamin 1 Tablet(s) Oral daily  pyridoxine 50 milliGRAM(s) Oral daily    MEDICATIONS  (PRN):  acetaminophen     Tablet .. 650 milliGRAM(s) Oral every 6 hours PRN Temp greater or equal to 38C (100.4F), Mild Pain (1 - 3)  melatonin 3 milliGRAM(s) Oral at bedtime PRN Insomnia  OLANZapine Injectable 2.5 milliGRAM(s) IntraMuscular every 8 hours PRN Agitation      CAPILLARY BLOOD GLUCOSE        I&O's Summary    11 Nov 2022 07:01  -  12 Nov 2022 07:00  --------------------------------------------------------  IN: 840 mL / OUT: 1200 mL / NET: -360 mL    12 Nov 2022 07:01  -  12 Nov 2022 14:01  --------------------------------------------------------  IN: 300 mL / OUT: 300 mL / NET: 0 mL        PHYSICAL EXAM:  Vital Signs Last 24 Hrs  T(C): 36.4 (12 Nov 2022 09:11), Max: 37 (11 Nov 2022 17:15)  T(F): 97.6 (12 Nov 2022 09:11), Max: 98.6 (11 Nov 2022 17:15)  HR: 75 (12 Nov 2022 09:11) (63 - 75)  BP: 112/69 (12 Nov 2022 09:11) (101/64 - 112/69)  BP(mean): --  RR: 18 (12 Nov 2022 09:11) (18 - 18)  SpO2: 97% (12 Nov 2022 09:11) (95% - 97%)    Parameters below as of 12 Nov 2022 09:11  Patient On (Oxygen Delivery Method): room air      CONSTITUTIONAL: Well-groomed, in no apparent distress  EYES: No conjunctival or scleral injection, non-icteric; PERRLA and symmetric  ENMT: No external nasal lesions; no pharyngeal injection or exudates, oral mucosa with moist membranes.   NECK: Trachea midline without palpable neck mass; thyroid not enlarged and non-tender  RESPIRATORY: Breathing comfortably; lungs CTA without wheeze/rhonchi/rales  CARDIOVASCULAR: +S1S2, RRR, no M/G/R; pedal pulses full and symmetric; no lower extremity edema  GASTROINTESTINAL: No palpable masses or tenderness, +BS throughout, no rebound/guarding; no hepatosplenomegaly; no hernia palpated  MUSCULOSKELETAL: no digital clubbing or cyanosis; no paraspinal tenderness; normal strength and tone of extremities  SKIN: No rashes or ulcers noted; no subcutaneous nodules or induration palpable  NEUROLOGIC: CN II-XII intact; sensation intact in LEs b/l to light touch.   PSYCHIATRIC: A+O x 3; anxious, not follow directions, trying to get out of bed
[facility] Division of Hospital Medicine  Vivian Quinones DO  Available on Teams Jian    Patient is a 72y old  Female who presents with a chief complaint of Fall, dysequilibrium (10 Nov 2022 12:47)      SUBJECTIVE / OVERNIGHT EVENTS:  ADDITIONAL REVIEW OF SYSTEMS: negative    MEDICATIONS  (STANDING):  cholecalciferol 1000 Unit(s) Oral daily  donepezil 5 milliGRAM(s) Oral at bedtime  haloperidol     Tablet 2 milliGRAM(s) Oral two times a day  INGREZZA 80 milliGRAM(s) 80 milliGRAM(s) Oral daily  multivitamin 1 Tablet(s) Oral daily  pyridoxine 50 milliGRAM(s) Oral daily    MEDICATIONS  (PRN):  acetaminophen     Tablet .. 650 milliGRAM(s) Oral every 6 hours PRN Temp greater or equal to 38C (100.4F), Mild Pain (1 - 3)  melatonin 3 milliGRAM(s) Oral at bedtime PRN Insomnia  OLANZapine Injectable 2.5 milliGRAM(s) IntraMuscular every 8 hours PRN Agitation      CAPILLARY BLOOD GLUCOSE        I&O's Summary    09 Nov 2022 07:01  -  10 Nov 2022 07:00  --------------------------------------------------------  IN: 1215 mL / OUT: 500 mL / NET: 715 mL        PHYSICAL EXAM:  Vital Signs Last 24 Hrs  T(C): 36.7 (10 Nov 2022 13:47), Max: 36.9 (09 Nov 2022 21:28)  T(F): 98.1 (10 Nov 2022 13:47), Max: 98.5 (09 Nov 2022 21:28)  HR: 60 (10 Nov 2022 13:47) (54 - 60)  BP: 125/69 (10 Nov 2022 13:47) (119/66 - 130/74)  BP(mean): --  RR: 18 (10 Nov 2022 13:47) (18 - 18)  SpO2: 95% (10 Nov 2022 13:47) (95% - 97%)    Parameters below as of 10 Nov 2022 13:47  Patient On (Oxygen Delivery Method): room air      CONSTITUTIONAL: Well-groomed, in no apparent distress  EYES: No conjunctival or scleral injection, non-icteric; PERRLA and symmetric  ENMT: No external nasal lesions; no pharyngeal injection or exudates, oral mucosa with moist membranes.   NECK: Trachea midline without palpable neck mass; thyroid not enlarged and non-tender  RESPIRATORY: Breathing comfortably; lungs CTA without wheeze/rhonchi/rales  CARDIOVASCULAR: +S1S2, RRR, no M/G/R; pedal pulses full and symmetric; no lower extremity edema  GASTROINTESTINAL: No palpable masses or tenderness, +BS throughout, no rebound/guarding; no hepatosplenomegaly; no hernia palpated  MUSCULOSKELETAL: no digital clubbing or cyanosis; no paraspinal tenderness; normal strength and tone of extremities  SKIN: No rashes or ulcers noted; no subcutaneous nodules or induration palpable  NEUROLOGIC: CN II-XII intact; sensation intact in LEs b/l to light touch. Exhibits repetitive mouth movements  PSYCHIATRIC: A+O x 3; mood and affect appropriate  
[facility] Division of Hospital Medicine  Vivian Quinones DO  Available on Teams Jian    Patient is a 72y old  Female who presents with a chief complaint of Fall, dysequilibrium (07 Nov 2022 15:22)      SUBJECTIVE / OVERNIGHT EVENTS: none, was not dizzy when she stood up yesterday, not dizzy now. Ate lunch fine.  ADDITIONAL REVIEW OF SYSTEMS: negative    MEDICATIONS  (STANDING):  cholecalciferol 1000 Unit(s) Oral daily  donepezil 5 milliGRAM(s) Oral at bedtime  haloperidol     Tablet 2 milliGRAM(s) Oral two times a day  INGREZZA 80mg capsule 1 Capsule(s) 1 Capsule(s) Oral daily  multivitamin 1 Tablet(s) Oral daily  pyridoxine 50 milliGRAM(s) Oral daily    MEDICATIONS  (PRN):  acetaminophen     Tablet .. 650 milliGRAM(s) Oral every 6 hours PRN Temp greater or equal to 38C (100.4F), Mild Pain (1 - 3)  melatonin 3 milliGRAM(s) Oral at bedtime PRN Insomnia  OLANZapine Injectable 2.5 milliGRAM(s) IntraMuscular every 8 hours PRN Agitation      CAPILLARY BLOOD GLUCOSE        I&O's Summary    07 Nov 2022 07:01  -  08 Nov 2022 07:00  --------------------------------------------------------  IN: 540 mL / OUT: 500 mL / NET: 40 mL    08 Nov 2022 07:01  -  08 Nov 2022 15:48  --------------------------------------------------------  IN: 360 mL / OUT: 0 mL / NET: 360 mL        PHYSICAL EXAM:  Vital Signs Last 24 Hrs  T(C): 36.6 (08 Nov 2022 12:05), Max: 36.8 (07 Nov 2022 16:27)  T(F): 97.8 (08 Nov 2022 12:05), Max: 98.3 (07 Nov 2022 16:27)  HR: 64 (08 Nov 2022 12:05) (51 - 64)  BP: 120/73 (08 Nov 2022 12:05) (113/70 - 133/72)  BP(mean): --  RR: 18 (08 Nov 2022 12:05) (18 - 18)  SpO2: 99% (08 Nov 2022 12:05) (95% - 99%)    Parameters below as of 08 Nov 2022 12:05  Patient On (Oxygen Delivery Method): room air      CONSTITUTIONAL: Well-groomed, in no apparent distress  EYES: No conjunctival or scleral injection, non-icteric; PERRLA and symmetric  ENMT: No external nasal lesions; no pharyngeal injection or exudates, oral mucosa with moist membranes.   NECK: Trachea midline without palpable neck mass; thyroid not enlarged and non-tender  RESPIRATORY: Breathing comfortably; lungs CTA without wheeze/rhonchi/rales  CARDIOVASCULAR: +S1S2, RRR, no M/G/R; pedal pulses full and symmetric; no lower extremity edema  GASTROINTESTINAL: No palpable masses or tenderness, +BS throughout, no rebound/guarding; no hepatosplenomegaly; no hernia palpated  MUSCULOSKELETAL: no digital clubbing or cyanosis; no paraspinal tenderness; normal strength and tone of extremities  SKIN: No rashes or ulcers noted; no subcutaneous nodules or induration palpable  NEUROLOGIC: CN II-XII intact; sensation intact in LEs b/l to light touch. Exhibits repetitive mouth movements  PSYCHIATRIC: A+O x 3; mood and affect appropriate    LABS:                        11.6   3.98  )-----------( 277      ( 08 Nov 2022 07:08 )             36.9   
[facility] Division of Hospital Medicine  Leonardo Patterson MD  Available via MS Teams      SUBJECTIVE / OVERNIGHT EVENTS:  No acute events overnight.     ADDITIONAL REVIEW OF SYSTEMS:  REVIEW OF SYSTEMS:    CONSTITUTIONAL: No weakness, fevers or chills  EYES: no blurry vision or eye pain.   ENT: No throat pain. No dysphagia.    NECK: No pain or stiffness  RESPIRATORY: No cough, wheezing, hemoptysis; No shortness of breath  CARDIOVASCULAR: No chest pain or palpitations.  GASTROINTESTINAL: No abdominal pain. No nausea or vomiting; No diarrhea or constipation. No melena or hematochezia.  GENITOURINARY: No dysuria, frequency or hematuria  NEUROLOGICAL: No numbness or weakness. No dizziness or falls.   SKIN: No itching, burning, rashes, or lesions.   LYMPHATIC: No masses or swelling.   All other review of systems is negative unless indicated above.  MEDICATIONS  (STANDING):  cholecalciferol 1000 Unit(s) Oral daily  donepezil 5 milliGRAM(s) Oral at bedtime  haloperidol     Tablet 2 milliGRAM(s) Oral two times a day  INGREZZA 80 milliGRAM(s) 80 milliGRAM(s) Oral daily  lidocaine   4% Patch 1 Patch Transdermal every 24 hours  multivitamin 1 Tablet(s) Oral daily  pyridoxine 50 milliGRAM(s) Oral daily    MEDICATIONS  (PRN):  acetaminophen     Tablet .. 650 milliGRAM(s) Oral every 6 hours PRN Temp greater or equal to 38C (100.4F), Mild Pain (1 - 3)  melatonin 3 milliGRAM(s) Oral at bedtime PRN Insomnia  OLANZapine Injectable 2.5 milliGRAM(s) IntraMuscular every 8 hours PRN Agitation      I&O's Summary    13 Nov 2022 07:01  -  14 Nov 2022 07:00  --------------------------------------------------------  IN: 1400 mL / OUT: 100 mL / NET: 1300 mL    14 Nov 2022 07:01  -  14 Nov 2022 14:11  --------------------------------------------------------  IN: 600 mL / OUT: 125 mL / NET: 475 mL        PHYSICAL EXAM:  Vital Signs Last 24 Hrs  T(C): 36.8 (14 Nov 2022 11:48), Max: 37.3 (13 Nov 2022 16:00)  T(F): 98.3 (14 Nov 2022 11:48), Max: 99.1 (13 Nov 2022 16:00)  HR: 77 (14 Nov 2022 11:48) (60 - 88)  BP: 125/77 (14 Nov 2022 11:48) (104/66 - 125/77)  BP(mean): --  RR: 18 (14 Nov 2022 11:48) (18 - 18)  SpO2: 98% (14 Nov 2022 11:48) (96% - 99%)    Parameters below as of 14 Nov 2022 11:48  Patient On (Oxygen Delivery Method): room air      CONSTITUTIONAL: NAD, well-developed, well-groomed  EYES: PERRLA; conjunctiva and sclera clear  ENMT: Moist oral mucosa, no pharyngeal injection or exudates; normal dentition  NECK: Supple, no palpable masses; no thyromegaly  RESPIRATORY: Normal respiratory effort; lungs are clear to auscultation bilaterally  CARDIOVASCULAR: Regular rate and rhythm, normal S1 and S2, no murmur/rub/gallop; No lower extremity edema; Peripheral pulses are 2+ bilaterally  ABDOMEN: Nontender to palpation, normoactive bowel sounds, no rebound/guarding; No hepatosplenomegaly  MUSCULOSKELETAL:  Normal gait; no clubbing or cyanosis of digits; no joint swelling or tenderness to palpation  PSYCH: A+O to person, place, and time; affect appropriate  NEUROLOGY: AAOx3, anxious, not following commands   SKIN: No rashes; no palpable lesions    LABS:                    COVID-19 PCR: NotDetec (12 Nov 2022 08:00)      RADIOLOGY & ADDITIONAL TESTS:  ekg, labs, micro imaging personally reviewed      COORDINATION OF CARE:  care discussed and coordinated with consultants.    
[facility] Division of Hospital Medicine  Vivian Quinones DO  Available on Teams Jian    Patient is a 72y old  Female who presents with a chief complaint of Fall, dysequilibrium (12 Nov 2022 14:01)      SUBJECTIVE / OVERNIGHT EVENTS: none. No complaints. More calm today, not trying to get out of bed.  ADDITIONAL REVIEW OF SYSTEMS: negative    MEDICATIONS  (STANDING):  cholecalciferol 1000 Unit(s) Oral daily  donepezil 5 milliGRAM(s) Oral at bedtime  haloperidol     Tablet 2 milliGRAM(s) Oral two times a day  INGREZZA 80 milliGRAM(s) 80 milliGRAM(s) Oral daily  lidocaine   4% Patch 1 Patch Transdermal every 24 hours  multivitamin 1 Tablet(s) Oral daily  pyridoxine 50 milliGRAM(s) Oral daily    MEDICATIONS  (PRN):  acetaminophen     Tablet .. 650 milliGRAM(s) Oral every 6 hours PRN Temp greater or equal to 38C (100.4F), Mild Pain (1 - 3)  melatonin 3 milliGRAM(s) Oral at bedtime PRN Insomnia  OLANZapine Injectable 2.5 milliGRAM(s) IntraMuscular every 8 hours PRN Agitation      CAPILLARY BLOOD GLUCOSE        I&O's Summary    12 Nov 2022 07:01  -  13 Nov 2022 07:00  --------------------------------------------------------  IN: 1080 mL / OUT: 750 mL / NET: 330 mL        PHYSICAL EXAM:  Vital Signs Last 24 Hrs  T(C): 37.1 (13 Nov 2022 12:20), Max: 37.3 (12 Nov 2022 20:00)  T(F): 98.7 (13 Nov 2022 12:20), Max: 99.1 (12 Nov 2022 20:00)  HR: 65 (13 Nov 2022 12:20) (57 - 69)  BP: 100/66 (13 Nov 2022 12:20) (100/66 - 110/70)  BP(mean): --  RR: 18 (13 Nov 2022 12:20) (18 - 18)  SpO2: 98% (13 Nov 2022 12:20) (98% - 99%)    Parameters below as of 13 Nov 2022 12:20  Patient On (Oxygen Delivery Method): room air        CONSTITUTIONAL: Well-groomed, in no apparent distress  EYES: No conjunctival or scleral injection, non-icteric; PERRLA and symmetric  ENMT: No external nasal lesions; no pharyngeal injection or exudates, oral mucosa with moist membranes.   NECK: Trachea midline without palpable neck mass; thyroid not enlarged and non-tender  RESPIRATORY: Breathing comfortably; lungs CTA without wheeze/rhonchi/rales  CARDIOVASCULAR: +S1S2, RRR, no M/G/R; pedal pulses full and symmetric; no lower extremity edema  GASTROINTESTINAL: No palpable masses or tenderness, +BS throughout, no rebound/guarding; no hepatosplenomegaly; no hernia palpated  MUSCULOSKELETAL: no digital clubbing or cyanosis; no paraspinal tenderness; normal strength and tone of extremities  SKIN: No rashes or ulcers noted; no subcutaneous nodules or induration palpable  NEUROLOGIC: CN II-XII intact; sensation intact in LEs b/l to light touch.   PSYCHIATRIC: A+O x 3; anxious, not follow directions, trying to get out of bed  
[facility] Division of Hospital Medicine  Leonardo Patterson MD  Available via MS Teams      SUBJECTIVE / OVERNIGHT EVENTS:  No acute events overnight    ADDITIONAL REVIEW OF SYSTEMS:  REVIEW OF SYSTEMS:    CONSTITUTIONAL: No weakness, fevers or chills  EYES: no blurry vision or eye pain.   ENT: No throat pain. No dysphagia.    NECK: No pain or stiffness  RESPIRATORY: No cough, wheezing, hemoptysis; No shortness of breath  CARDIOVASCULAR: No chest pain or palpitations.  GASTROINTESTINAL: No abdominal pain. No nausea or vomiting; No diarrhea or constipation. No melena or hematochezia.  GENITOURINARY: No dysuria, frequency or hematuria  NEUROLOGICAL: No numbness or weakness. No dizziness or falls.   SKIN: No itching, burning, rashes, or lesions.   LYMPHATIC: No masses or swelling.   All other review of systems is negative unless indicated above.  MEDICATIONS  (STANDING):  cholecalciferol 1000 Unit(s) Oral daily  donepezil 5 milliGRAM(s) Oral at bedtime  haloperidol     Tablet 2 milliGRAM(s) Oral two times a day  INGREZZA 80 milliGRAM(s) 80 milliGRAM(s) Oral daily  lidocaine   4% Patch 1 Patch Transdermal every 24 hours  multivitamin 1 Tablet(s) Oral daily  pyridoxine 50 milliGRAM(s) Oral daily    MEDICATIONS  (PRN):  acetaminophen     Tablet .. 650 milliGRAM(s) Oral every 6 hours PRN Temp greater or equal to 38C (100.4F), Mild Pain (1 - 3)  melatonin 3 milliGRAM(s) Oral at bedtime PRN Insomnia  OLANZapine Injectable 2.5 milliGRAM(s) IntraMuscular every 8 hours PRN Agitation      I&O's Summary    14 Nov 2022 07:01  -  15 Nov 2022 07:00  --------------------------------------------------------  IN: 2180 mL / OUT: 275 mL / NET: 1905 mL    15 Nov 2022 07:01  -  15 Nov 2022 14:01  --------------------------------------------------------  IN: 240 mL / OUT: 125 mL / NET: 115 mL        PHYSICAL EXAM:  Vital Signs Last 24 Hrs  T(C): 37.2 (15 Nov 2022 13:08), Max: 37.2 (15 Nov 2022 13:08)  T(F): 98.9 (15 Nov 2022 13:08), Max: 98.9 (15 Nov 2022 13:08)  HR: 86 (15 Nov 2022 13:08) (55 - 86)  BP: 105/65 (15 Nov 2022 13:08) (99/60 - 120/74)  BP(mean): --  RR: 18 (15 Nov 2022 13:08) (18 - 18)  SpO2: 97% (15 Nov 2022 13:08) (95% - 100%)    Parameters below as of 15 Nov 2022 13:08  Patient On (Oxygen Delivery Method): room air    CONSTITUTIONAL: NAD, well-developed, well-groomed  EYES: PERRLA; conjunctiva and sclera clear  ENMT: Moist oral mucosa, no pharyngeal injection or exudates; normal dentition  NECK: Supple, no palpable masses; no thyromegaly  RESPIRATORY: Normal respiratory effort; lungs are clear to auscultation bilaterally  CARDIOVASCULAR: Regular rate and rhythm, normal S1 and S2, no murmur/rub/gallop; No lower extremity edema; Peripheral pulses are 2+ bilaterally  ABDOMEN: Nontender to palpation, normoactive bowel sounds, no rebound/guarding; No hepatosplenomegaly  MUSCULOSKELETAL:  Normal gait; no clubbing or cyanosis of digits; no joint swelling or tenderness to palpation  PSYCH: A+O to person, place, and time; affect appropriate  NEUROLOGY: AAOx3, anxious, not following commands   SKIN: No rashes; no palpable lesions      LABS:                    COVID-19 PCR: NotDetec (12 Nov 2022 08:00)      RADIOLOGY & ADDITIONAL TESTS:  ekg, labs, micro imaging personally reviewed      COORDINATION OF CARE:  care discussed and coordinated with consultants.    
[facility] Division of Hospital Medicine  Leonardo Patterson MD  Available via MS Teams      SUBJECTIVE / OVERNIGHT EVENTS:  No acute events overnight    ADDITIONAL REVIEW OF SYSTEMS:    CONSTITUTIONAL: No weakness, fevers or chills  EYES: no blurry vision or eye pain.   ENT: No throat pain. No dysphagia.    NECK: No pain or stiffness  RESPIRATORY: No cough, wheezing, hemoptysis; No shortness of breath  CARDIOVASCULAR: No chest pain or palpitations.  GASTROINTESTINAL: No abdominal pain. No nausea or vomiting; No diarrhea or constipation. No melena or hematochezia.  GENITOURINARY: No dysuria, frequency or hematuria  NEUROLOGICAL: No numbness or weakness. No dizziness or falls.   SKIN: No itching, burning, rashes, or lesions.   LYMPHATIC: No masses or swelling.   All other review of systems is negative unless indicated above.  MEDICATIONS  (STANDING):  cholecalciferol 1000 Unit(s) Oral daily  donepezil 5 milliGRAM(s) Oral at bedtime  haloperidol     Tablet 2 milliGRAM(s) Oral two times a day  INGREZZA 80 milliGRAM(s) 80 milliGRAM(s) Oral daily  lidocaine   4% Patch 1 Patch Transdermal every 24 hours  multivitamin 1 Tablet(s) Oral daily  pyridoxine 50 milliGRAM(s) Oral daily    MEDICATIONS  (PRN):  acetaminophen     Tablet .. 650 milliGRAM(s) Oral every 6 hours PRN Temp greater or equal to 38C (100.4F), Mild Pain (1 - 3)  melatonin 3 milliGRAM(s) Oral at bedtime PRN Insomnia  OLANZapine Injectable 2.5 milliGRAM(s) IntraMuscular every 8 hours PRN Agitation      I&O's Summary    15 Nov 2022 07:01  -  16 Nov 2022 07:00  --------------------------------------------------------  IN: 1160 mL / OUT: 1075 mL / NET: 85 mL    16 Nov 2022 07:01  -  16 Nov 2022 13:58  --------------------------------------------------------  IN: 480 mL / OUT: 200 mL / NET: 280 mL        PHYSICAL EXAM:  Vital Signs Last 24 Hrs  T(C): 37 (16 Nov 2022 12:01), Max: 37.3 (15 Nov 2022 16:00)  T(F): 98.6 (16 Nov 2022 12:01), Max: 99.1 (15 Nov 2022 16:00)  HR: 68 (16 Nov 2022 12:01) (57 - 68)  BP: 119/71 (16 Nov 2022 12:01) (99/61 - 124/65)  BP(mean): 3 (16 Nov 2022 12:01) (3 - 3)  RR: 18 (16 Nov 2022 12:01) (18 - 18)  SpO2: 99% (16 Nov 2022 12:01) (97% - 100%)    Parameters below as of 16 Nov 2022 12:01  Patient On (Oxygen Delivery Method): room air      CONSTITUTIONAL: NAD, well-developed, well-groomed  EYES: PERRLA; conjunctiva and sclera clear  ENMT: Moist oral mucosa, no pharyngeal injection or exudates; normal dentition  NECK: Supple, no palpable masses; no thyromegaly  RESPIRATORY: Normal respiratory effort; lungs are clear to auscultation bilaterally  CARDIOVASCULAR: Regular rate and rhythm, normal S1 and S2, no murmur/rub/gallop; No lower extremity edema; Peripheral pulses are 2+ bilaterally  ABDOMEN: Nontender to palpation, normoactive bowel sounds, no rebound/guarding; No hepatosplenomegaly  MUSCULOSKELETAL:  Normal gait; no clubbing or cyanosis of digits; no joint swelling or tenderness to palpation  PSYCH: A+O to person, place, and time; affect appropriate  NEUROLOGY: CN 2-12 are intact and symmetric; no gross sensory deficits   SKIN: No rashes; no palpable lesions    LABS:                    COVID-19 PCR: NotDetec (12 Nov 2022 08:00)      RADIOLOGY & ADDITIONAL TESTS:  ekg, labs, micro imaging personally reviewed      COORDINATION OF CARE:  care discussed and coordinated with consultants.    
Malgorzata Ty MD  Division of Hospital Medicine  Pager: [phone] or reachable on MS Teams  After hours please page [phone]    PROGRESS NOTE:     Patient is a 72y old  Female who presents with a chief complaint of Fall, dysequilibrium (06 Nov 2022 07:54)      SUBJECTIVE / OVERNIGHT EVENTS: No acute events overnight, pt seen and evaluated this AM. States she is still having pain from her fractures, wants tylenol. Otherwise no cardiac chest pain, no shortness of breath, no abdominal pain.     ADDITIONAL REVIEW OF SYSTEMS: as abaove    MEDICATIONS  (STANDING):  cholecalciferol 1000 Unit(s) Oral daily  INGREZZA 40 milliGRAM(s) 40 milliGRAM(s) Oral daily  multivitamin 1 Tablet(s) Oral daily  pyridoxine 50 milliGRAM(s) Oral daily    MEDICATIONS  (PRN):  acetaminophen     Tablet .. 650 milliGRAM(s) Oral every 6 hours PRN Temp greater or equal to 38C (100.4F), Mild Pain (1 - 3)  melatonin 3 milliGRAM(s) Oral at bedtime PRN Insomnia      CAPILLARY BLOOD GLUCOSE        I&O's Summary    05 Nov 2022 08:01  -  06 Nov 2022 07:00  --------------------------------------------------------  IN: 300 mL / OUT: 0 mL / NET: 300 mL    06 Nov 2022 07:01  -  06 Nov 2022 15:04  --------------------------------------------------------  IN: 360 mL / OUT: 0 mL / NET: 360 mL        PHYSICAL EXAM:  Vital Signs Last 24 Hrs  T(C): 36.8 (06 Nov 2022 12:00), Max: 37.2 (05 Nov 2022 20:03)  T(F): 98.3 (06 Nov 2022 12:00), Max: 98.9 (05 Nov 2022 20:03)  HR: 61 (06 Nov 2022 12:00) (56 - 82)  BP: 118/75 (06 Nov 2022 12:00) (118/75 - 148/68)  BP(mean): --  RR: 18 (06 Nov 2022 12:00) (18 - 18)  SpO2: 97% (06 Nov 2022 12:00) (95% - 100%)    Parameters below as of 06 Nov 2022 12:00  Patient On (Oxygen Delivery Method): room air        CONSTITUTIONAL: NAD, thin female, bradykinetic movements  RESPIRATORY: Normal respiratory effort; lungs are clear to auscultation bilaterally  CARDIOVASCULAR: Regular rate and rhythm, normal S1 and S2, no murmur/rub/gallop; No lower extremity edema; Peripheral pulses are 2+ bilaterally  ABDOMEN: Nontender to palpation, normoactive bowel sounds, no rebound/guarding; No hepatosplenomegaly  MUSCLOSKELETAL: no clubbing or cyanosis of digits; no joint swelling or tenderness to palpation  PSYCH: A+O to person, place, affect appropriate though requires things to be repeated multiple times for comprehension    LABS:                        11.6   3.30  )-----------( 145      ( 06 Nov 2022 06:03 )             37.5     11-06    142  |  105  |  12  ----------------------------<  77  3.8   |  28  |  0.80    Ca    9.0      06 Nov 2022 06:01  Phos  2.9     11-06  Mg     2.1     11-06    TPro  6.9  /  Alb  3.5  /  TBili  0.5  /  DBili  x   /  AST  16  /  ALT  14  /  AlkPhos  93  11-06    PT/INR - ( 05 Nov 2022 01:56 )   PT: 11.6 sec;   INR: 1.01 ratio         PTT - ( 05 Nov 2022 01:56 )  PTT:22.5 sec            RADIOLOGY & ADDITIONAL TESTS:  Results Reviewed:   Imaging Personally Reviewed:  Electrocardiogram Personally Reviewed:    COORDINATION OF CARE:  Care Discussed with Consultants/Other Providers [Y/N]:  Prior or Outpatient Records Reviewed [Y/N]:  
[facility] Division of Hospital Medicine  Leonardo Patterson MD  Available via MS Teams      SUBJECTIVE / OVERNIGHT EVENTS:  No acute events overnight. Patient complained of tooth pain. Tylenol PRN was given.     ADDITIONAL REVIEW OF SYSTEMS:  REVIEW OF SYSTEMS:    CONSTITUTIONAL: No weakness, fevers or chills  EYES: no blurry vision or eye pain.   ENT: No throat pain. No dysphagia.    NECK: No pain or stiffness  RESPIRATORY: No cough, wheezing, hemoptysis; No shortness of breath  CARDIOVASCULAR: No chest pain or palpitations.  GASTROINTESTINAL: No abdominal pain. No nausea or vomiting; No diarrhea or constipation. No melena or hematochezia.  GENITOURINARY: No dysuria, frequency or hematuria  NEUROLOGICAL: No numbness or weakness. No dizziness or falls.   SKIN: No itching, burning, rashes, or lesions.   LYMPHATIC: No masses or swelling.   All other review of systems is negative unless indicated above.  MEDICATIONS  (STANDING):  cholecalciferol 1000 Unit(s) Oral daily  donepezil 5 milliGRAM(s) Oral at bedtime  haloperidol     Tablet 2 milliGRAM(s) Oral two times a day  INGREZZA 80 milliGRAM(s) 80 milliGRAM(s) Oral daily  lidocaine   4% Patch 1 Patch Transdermal every 24 hours  multivitamin 1 Tablet(s) Oral daily  pyridoxine 50 milliGRAM(s) Oral daily    MEDICATIONS  (PRN):  acetaminophen     Tablet .. 650 milliGRAM(s) Oral every 6 hours PRN Temp greater or equal to 38C (100.4F), Mild Pain (1 - 3)  melatonin 3 milliGRAM(s) Oral at bedtime PRN Insomnia  OLANZapine Injectable 2.5 milliGRAM(s) IntraMuscular every 8 hours PRN Agitation      I&O's Summary    17 Nov 2022 07:01  -  18 Nov 2022 07:00  --------------------------------------------------------  IN: 900 mL / OUT: 650 mL / NET: 250 mL    18 Nov 2022 07:01  -  18 Nov 2022 15:42  --------------------------------------------------------  IN: 600 mL / OUT: 200 mL / NET: 400 mL        PHYSICAL EXAM:  Vital Signs Last 24 Hrs  T(C): 36.9 (18 Nov 2022 12:38), Max: 36.9 (18 Nov 2022 12:38)  T(F): 98.4 (18 Nov 2022 12:38), Max: 98.4 (18 Nov 2022 12:38)  HR: 57 (18 Nov 2022 12:38) (57 - 63)  BP: 115/78 (18 Nov 2022 12:38) (104/64 - 124/71)  BP(mean): --  RR: 18 (18 Nov 2022 12:38) (18 - 18)  SpO2: 97% (18 Nov 2022 12:38) (97% - 99%)    Parameters below as of 18 Nov 2022 12:38  Patient On (Oxygen Delivery Method): room air      CONSTITUTIONAL: NAD, well-developed, well-groomed  EYES: PERRLA; conjunctiva and sclera clear  ENMT: Moist oral mucosa, no pharyngeal injection or exudates; normal dentition  NECK: Supple, no palpable masses; no thyromegaly  RESPIRATORY: Normal respiratory effort; lungs are clear to auscultation bilaterally  CARDIOVASCULAR: Regular rate and rhythm, normal S1 and S2, no murmur/rub/gallop; No lower extremity edema; Peripheral pulses are 2+ bilaterally  ABDOMEN: Nontender to palpation, normoactive bowel sounds, no rebound/guarding; No hepatosplenomegaly  MUSCULOSKELETAL:  Normal gait; no clubbing or cyanosis of digits; no joint swelling or tenderness to palpation  PSYCH: A+O to person, place; affect flat  NEUROLOGY: CN 2-12 are intact and symmetric; no gross sensory deficits   SKIN: No rashes; no palpable lesions    LABS:                    COVID-19 PCR: NotDetec (12 Nov 2022 08:00)      RADIOLOGY & ADDITIONAL TESTS:  ekg, labs, micro imaging personally reviewed      COORDINATION OF CARE:  care discussed and coordinated with consultants.    
[facility] Division of Hospital Medicine  Vivian Quinones DO  Available on Teams Jian    Patient is a 72y old  Female who presents with a chief complaint of Fall, dysequilibrium (07 Nov 2022 12:50)      SUBJECTIVE / OVERNIGHT EVENTS: none. Patient is aaox3, though repeats questions a lot. Denies pain.  ADDITIONAL REVIEW OF SYSTEMS: negative    MEDICATIONS  (STANDING):  cholecalciferol 1000 Unit(s) Oral daily  donepezil 5 milliGRAM(s) Oral at bedtime  haloperidol     Tablet 2 milliGRAM(s) Oral two times a day  INGREZZA 40 milliGRAM(s) 40 milliGRAM(s) Oral daily  multivitamin 1 Tablet(s) Oral daily  pyridoxine 50 milliGRAM(s) Oral daily    MEDICATIONS  (PRN):  acetaminophen     Tablet .. 650 milliGRAM(s) Oral every 6 hours PRN Temp greater or equal to 38C (100.4F), Mild Pain (1 - 3)  melatonin 3 milliGRAM(s) Oral at bedtime PRN Insomnia  OLANZapine Injectable 2.5 milliGRAM(s) IntraMuscular every 8 hours PRN Agitation      CAPILLARY BLOOD GLUCOSE        I&O's Summary    06 Nov 2022 07:01  -  07 Nov 2022 07:00  --------------------------------------------------------  IN: 840 mL / OUT: 650 mL / NET: 190 mL    07 Nov 2022 07:01  -  07 Nov 2022 15:23  --------------------------------------------------------  IN: 540 mL / OUT: 200 mL / NET: 340 mL        PHYSICAL EXAM:  Vital Signs Last 24 Hrs  T(C): 36.7 (07 Nov 2022 12:00), Max: 37.1 (06 Nov 2022 23:44)  T(F): 98 (07 Nov 2022 12:00), Max: 98.8 (07 Nov 2022 08:53)  HR: 68 (07 Nov 2022 12:00) (60 - 68)  BP: 115/69 (07 Nov 2022 12:00) (115/69 - 135/72)  BP(mean): --  RR: 18 (07 Nov 2022 12:00) (18 - 18)  SpO2: 97% (07 Nov 2022 12:00) (97% - 100%)    Parameters below as of 07 Nov 2022 12:00  Patient On (Oxygen Delivery Method): room air        CONSTITUTIONAL: Well-groomed, in no apparent distress  EYES: No conjunctival or scleral injection, non-icteric; PERRLA and symmetric  ENMT: No external nasal lesions; no pharyngeal injection or exudates, oral mucosa with moist membranes  NECK: Trachea midline without palpable neck mass; thyroid not enlarged and non-tender  RESPIRATORY: Breathing comfortably; lungs CTA without wheeze/rhonchi/rales  CARDIOVASCULAR: +S1S2, RRR, no M/G/R; pedal pulses full and symmetric; no lower extremity edema  GASTROINTESTINAL: No palpable masses or tenderness, +BS throughout, no rebound/guarding; no hepatosplenomegaly; no hernia palpated  MUSCULOSKELETAL: no digital clubbing or cyanosis; no paraspinal tenderness; normal strength and tone of extremities  SKIN: No rashes or ulcers noted; no subcutaneous nodules or induration palpable  NEUROLOGIC: CN II-XII intact; sensation intact in LEs b/l to light touch  PSYCHIATRIC: A+O x 3; mood and affect appropriate    LABS:                        11.6   3.30  )-----------( 145      ( 06 Nov 2022 06:03 )             37.5     11-06    142  |  105  |  12  ----------------------------<  77  3.8   |  28  |  0.80    Ca    9.0      06 Nov 2022 06:01  Phos  2.9     11-06  Mg     2.1     11-06    TPro  6.9  /  Alb  3.5  /  TBili  0.5  /  DBili  x   /  AST  16  /  ALT  14  /  AlkPhos  93  11-06

## 2022-11-18 NOTE — DISCHARGE NOTE NURSING/CASE MANAGEMENT/SOCIAL WORK - NSDCPEFALRISK_GEN_ALL_CORE
For information on Fall & Injury Prevention, visit: [email or web address] OR  [email or web address] OR  https://www.cdc.gov/steadi/patient.html

## 2022-11-18 NOTE — DIETITIAN INITIAL EVALUATION ADULT - REASON FOR ADMISSION
Per chart, Pt is a 71 y/o F with PMH: "paranoid schizophrenia w/ tardive dyskinesia presenting after a fall down 20 steps resulting in sternal fracture." Also with rib fractures. No surgical intervention at this time. Psych following, no need for inpatient psych admission. Pending PIETRO placement.

## 2023-02-16 NOTE — BH INPATIENT PSYCHIATRY PROGRESS NOTE - NSBHFUPINTERVALHXFT_PSY_A_CORE
Chart reviewed and case discussed with treatment team. Staff report patient continues to   Chart reviewed and case discussed with treatment team. Staff report patient has been compliant with PO abilify after treatment over objection order. She continues to be paranoid about landlord and also this writer. She refused to discuss her care this morning stating writer should speak to her  instead. Patient continues to have very poor insight about her illness.    Ear Wedge Repair Text: A wedge excision was completed by carrying down an excision through the full thickness of the ear and cartilage with an inward facing Burow's triangle. The wound was then closed in a layered fashion.

## 2023-03-06 NOTE — OCCUPATIONAL THERAPY INITIAL EVALUATION ADULT - BED MOBILITY TRAINING, PT EVAL
Stable. GOAL: Pt will complete all bed mobility independently to improve functional abilities within 4 weeks.

## 2023-04-10 NOTE — ED ADULT NURSE NOTE - NSFALLRSKHRMRISKTYPE_ED_ALL_ED
Continue Ibuprofen for pain. Norco as needed for pain control. Strain your urine to attempt to catch the stone. Follow up with urology.
bones(Osteoporosis,prev fx,steroid use,metastatic bone ca)

## 2023-04-16 NOTE — BH INPATIENT PSYCHIATRY DISCHARGE NOTE - NSBHDCHANDOFFVIA_PSY_ALL_CORE
Nursing Suicide Assessment Note - Inpatient    Current assessment:    Pt sitting up in room. Isolative to same. Pt affect is flat and depressed. Reports neck discomfort and prn is given for same. Polite, cooperative with RN. Denies nursing needs at this time.     Current C-SSRS score: Negative Screen - White      Protective Factors / Reason for Living: Social supports    · Interventions: Maintain current plan of care.   Email

## 2023-05-03 NOTE — SOCIAL WORK POST DISCHARGE FOLLOW UP NOTE - NSBHSWFOLLOWUP_PSY_ALL_CORE_FT
message from [facility] team Erinn Lima   [phone]; they are now assigned patient. she inquired if pt was dc with AOT. cecilio called back to advise sw out of office at time of dc however notes indicated AOT application was not followed up prior to dc with notations could be addressed from community if needed..  suggested she call daughter sue at  for most recent info (pt discharged Sept 2022)

## 2023-11-28 ENCOUNTER — APPOINTMENT (OUTPATIENT)
Dept: OPHTHALMOLOGY | Facility: CLINIC | Age: 73
End: 2023-11-28
Payer: MEDICARE

## 2023-11-28 ENCOUNTER — NON-APPOINTMENT (OUTPATIENT)
Age: 73
End: 2023-11-28

## 2023-11-28 PROCEDURE — 92136 OPHTHALMIC BIOMETRY: CPT

## 2023-11-28 PROCEDURE — 92025 CPTRIZED CORNEAL TOPOGRAPHY: CPT

## 2023-11-28 PROCEDURE — 92004 COMPRE OPH EXAM NEW PT 1/>: CPT | Mod: 57

## 2024-01-03 NOTE — BH TREATMENT PLAN - NSTXDCOTHRPROGRES_PSY_ALL_CORE
Improving
No Change
Stable
No Change

## 2024-01-04 ENCOUNTER — APPOINTMENT (OUTPATIENT)
Dept: OPHTHALMOLOGY | Facility: CLINIC | Age: 74
End: 2024-01-04
Payer: MEDICARE

## 2024-01-04 ENCOUNTER — NON-APPOINTMENT (OUTPATIENT)
Age: 74
End: 2024-01-04

## 2024-01-04 PROCEDURE — 76519 ECHO EXAM OF EYE: CPT | Mod: LT

## 2024-01-19 NOTE — BH INPATIENT PSYCHIATRY PROGRESS NOTE - CURRENT MEDICATION
done MEDICATIONS  (STANDING):  ARIPiprazole  Solution 10 milliGRAM(s) Oral daily  chlorhexidine 0.12% Liquid 15 milliLiter(s) Swish and Spit two times a day    MEDICATIONS  (PRN):  LORazepam     Tablet 1 milliGRAM(s) Oral every 8 hours PRN severe anxiety  OLANZapine Injectable 5 milliGRAM(s) IntraMuscular at bedtime PRN refusing abilify PO

## 2024-01-19 NOTE — DIETITIAN INITIAL EVALUATION ADULT - DIET TYPE
Today, pain with urination, burning.  HPI: Radha Barclay is a 25 year old female who complains of burning with urination and dysuria for 1 days. Patient does not have a history of recurrent UTI or pyelonephritis.    The following portions of the patient's history were reviewed by a provider in this encounter and updated as appropriate:     Review of Systems      Visit Vitals  /78 (BP Location: LUE - Left upper extremity, Patient Position: Sitting, Cuff Size: Regular)   Pulse 74   Temp 97.2 °F (36.2 °C) (Temporal)   Resp 18   Ht 5' 6\" (1.676 m)   Wt 59 kg (130 lb)   LMP 06/11/2023   SpO2 97%   BMI 20.98 kg/m²     General: alert and cooperative  Abdomen: soft, non-tender, without masses or organomegaly  Back: CVA tenderness absent    Laboratory:   Urine dipstick shows +protein, +blood.    Micro exam: .    Radha was seen today for video visit, office visit and uti.    Diagnoses and all orders for this visit:    Urinary tract infection without hematuria, site unspecified  -     Cancel: POCT Urine Dip Non-Auto  -     POCT Urine Dip Non-Auto  -     Urine, Bacterial Culture  -     Discontinue: nitrofurantoin, macrocrystal-monohydrate, (MACROBID) 100 MG capsule; Take 1 capsule by mouth in the morning and 1 capsule in the evening. Do all this for 5 days.  -     Discontinue: nitrofurantoin, macrocrystal-monohydrate, (MACROBID) 100 MG capsule; Take 1 capsule by mouth in the morning and 1 capsule in the evening. Do all this for 7 days.  -     nitrofurantoin, macrocrystal-monohydrate, (MACROBID) 100 MG capsule; Take 1 capsule by mouth in the morning and 1 capsule in the evening. Do all this for 7 days.  -     nitrofurantoin, macrocrystal-monohydrate, (MACROBID) 100 MG capsule; Take 1 capsule by mouth in the morning and 1 capsule in the evening. Do all this for 5 days.            
regular

## 2024-01-23 PROBLEM — Z00.00 ENCOUNTER FOR PREVENTIVE HEALTH EXAMINATION: Status: ACTIVE | Noted: 2024-01-23

## 2024-02-06 ENCOUNTER — APPOINTMENT (OUTPATIENT)
Dept: OPHTHALMOLOGY | Facility: AMBULATORY SURGERY CENTER | Age: 74
End: 2024-02-06

## 2024-02-07 ENCOUNTER — APPOINTMENT (OUTPATIENT)
Dept: OPHTHALMOLOGY | Facility: CLINIC | Age: 74
End: 2024-02-07

## 2024-02-13 ENCOUNTER — APPOINTMENT (OUTPATIENT)
Dept: OPHTHALMOLOGY | Facility: CLINIC | Age: 74
End: 2024-02-13

## 2024-02-13 NOTE — DISCHARGE NOTE PROVIDER - HOSPITAL COURSE
72F w/ PMHx of paranoid schizophrenia w/ tardive dyskinesia, Vit D deficiency, pre-DM presenting after a fall down 20 steps resulting in sternal fracture likely 2/2 ongoing dysequilibrium.     Problem/Plan - 1:  ·  Problem: Sternal fracture.   ·  Plan: Noted on CT imaging from [facility]: Sternal manubrium fx, mildly displaced, w/evidence retrosternal hematoma. Anterior L 2nd rib fx minimally displaced, L 3rd rib fx nondisplaced w/o hemo/pneumothorax. Evaluated by Trauma here, no surgical intervention noted at this time  -Tylenol PRN for pain control  -Incentive spirometer ordered, pt amenable to using it.     Problem/Plan - 2:  ·  Problem: Dysequilibrium.   ·  Plan: -Noted on CT imaging from [facility]: Sternal manubrium fx, mildly displaced, w/evidence retrosternal hematoma. Anterior L 2nd rib fx minimally displaced, L 3rd rib fx nondisplaced w/o hemo/pneumothorax. Evaluated by Trauma here, no surgical intervention noted at this time  -Tylenol PRN for pain control  -Incentive spirometer ordered, pt amenable to using it.     Problem/Plan - 3:  ·  Problem: Schizophrenia, paranoid.   ·  Plan: Follows with Dr. Steven Mosquera  -was previously on haldol IM once a month but per daughter has not received any in last 5 weeks, goal was to try to wean pt off antipsychotics to see if they were contributing to her dysequilibirum  -Psych consulted, started on haldol 2mg BID and 5mg donepezil qhs.     Problem/Plan - 4:  ·  Problem: Tardive dyskinesia.   ·  Plan: -Followed by Neurology (Dr. Andres), recs appreciated  -cont Ingrezza, increased to 80mg qd, vitamin B6  -Titration of Ingrezza per Neurology.     Problem/Plan - 5:  ·  Problem: Vitamin D deficiency.   ·  Plan: c/w Vit D and MVI  -outpt follow up.     Problem/Plan - 6:  ·  Problem: Need for prophylactic measure.   ·  Plan: PT recs [facility], per psych no need for inpatient psych admission, pending placement, awaiting authorization.       show   72F w/ PMHx of paranoid schizophrenia w/ tardive dyskinesia, Vit D deficiency, pre-DM presenting after a fall down 20 steps resulting in sternal fracture likely 2/2 ongoing dysequilibrium.     Problem/Plan - 1:  ·  Problem: Sternal fracture.   ·  Plan: Noted on CT imaging from [facility]: Sternal manubrium fx, mildly displaced, w/evidence retrosternal hematoma. Anterior L 2nd rib fx minimally displaced, L 3rd rib fx nondisplaced w/o hemo/pneumothorax. Evaluated by Trauma here, no surgical intervention noted at this time  -Tylenol PRN for pain control  -Incentive spirometer ordered, pt amenable to using it.     Problem/Plan - 2:  ·  Problem: Dysequilibrium.   ·  Plan: -Noted on CT imaging from [facility]: Sternal manubrium fx, mildly displaced, w/evidence retrosternal hematoma. Anterior L 2nd rib fx minimally displaced, L 3rd rib fx nondisplaced w/o hemo/pneumothorax. Evaluated by Trauma here, no surgical intervention noted at this time  -Tylenol PRN for pain control  -Incentive spirometer ordered, pt amenable to using it.     Problem/Plan - 3:  ·  Problem: Schizophrenia, paranoid.   ·  Plan: Follows with Dr. Steven Mosquera  -was previously on haldol IM once a month but per daughter has not received any in last 5 weeks, goal was to try to wean pt off antipsychotics to see if they were contributing to her dysequilibirum  -Psych consulted, started on haldol 2mg BID and 5mg donepezil qhs.     Problem/Plan - 4:  ·  Problem: Tardive dyskinesia.   ·  Plan: -Followed by Neurology (Dr. Andres), recs appreciated  -cont Ingrezza, increased to 80mg qd, vitamin B6  -Titration of Ingrezza per Neurology.     Problem/Plan - 5:  ·  Problem: Vitamin D deficiency.   ·  Plan: c/w Vit D and MVI  -outpt follow up.     Problem/Plan - 6:  ·  Problem: Need for prophylactic measure.   ·  Plan: PT recs [facility], per psych no need for inpatient psych admission, Stable to DC today

## 2024-03-07 ENCOUNTER — OUTPATIENT (OUTPATIENT)
Dept: OUTPATIENT SERVICES | Facility: HOSPITAL | Age: 74
LOS: 1 days | End: 2024-03-07
Payer: MEDICARE

## 2024-03-07 VITALS
HEIGHT: 69 IN | DIASTOLIC BLOOD PRESSURE: 76 MMHG | OXYGEN SATURATION: 98 % | HEART RATE: 78 BPM | WEIGHT: 126.99 LBS | SYSTOLIC BLOOD PRESSURE: 129 MMHG | TEMPERATURE: 98 F

## 2024-03-07 DIAGNOSIS — Z01.818 ENCOUNTER FOR OTHER PREPROCEDURAL EXAMINATION: ICD-10-CM

## 2024-03-07 DIAGNOSIS — H25.812 COMBINED FORMS OF AGE-RELATED CATARACT, LEFT EYE: ICD-10-CM

## 2024-03-07 PROCEDURE — G0463: CPT

## 2024-03-07 NOTE — H&P PST ADULT - HISTORY OF PRESENT ILLNESS
74 y/o  female with P<H of Tardive dyskinesia on wheel chair followed with her daughter for PST for scheduled left eye cataract. C/o blindness, no pain and tearing  for more than a year. 72 y/o  female with PMH of Tardive dyskinesia on wheel chair followed with her daughter for PST for scheduled left eye cataract. C/o blindness, no pain and tearing  for more than a year.

## 2024-03-07 NOTE — H&P PST ADULT - NSICDXPROCEDURE_GEN_ALL_CORE_FT
PROCEDURES:  Excision of cataract of left eye without insertion of intraocular lens 07-Mar-2024 11:40:26  Tea Stanton

## 2024-03-07 NOTE — H&P PST ADULT - NSANTHOSAYNRD_GEN_A_CORE
No. ADELAIDA screening performed.  STOP BANG Legend: 0-2 = LOW Risk; 3-4 = INTERMEDIATE Risk; 5-8 = HIGH Risk

## 2024-03-07 NOTE — H&P PST ADULT - ASSESSMENT
72 y/o female with left eye cataract  Planned surgery.- left cataract extraction  Will obtain medical clearance-cbc, bmp, EKG fronm PMD-2/6/24-verified  Pre op instructions provided  Instructions provided on medications to continue and to take the day morning of surgery   74 y/o female with left eye cataract  Planned surgery.- left cataract extraction  Will obtain medical clearance-cbc, bmp, EKG from PMD-2/6/24-verified  Pre op instructions provided  Instructions provided on medications to continue and to take the day morning of surgery

## 2024-03-07 NOTE — H&P PST ADULT - NSICDXPASTMEDICALHX_GEN_ALL_CORE_FT
PAST MEDICAL HISTORY:  Cataract, left     History of paranoid schizophrenia     Pre-diabetes     Tardive dyskinesia     Vitamin D deficiency

## 2024-03-12 ENCOUNTER — APPOINTMENT (OUTPATIENT)
Dept: OPHTHALMOLOGY | Facility: CLINIC | Age: 74
End: 2024-03-12

## 2024-03-20 ENCOUNTER — TRANSCRIPTION ENCOUNTER (OUTPATIENT)
Age: 74
End: 2024-03-20

## 2024-03-21 ENCOUNTER — NON-APPOINTMENT (OUTPATIENT)
Age: 74
End: 2024-03-21

## 2024-03-21 ENCOUNTER — TRANSCRIPTION ENCOUNTER (OUTPATIENT)
Age: 74
End: 2024-03-21

## 2024-03-21 ENCOUNTER — OUTPATIENT (OUTPATIENT)
Dept: OUTPATIENT SERVICES | Facility: HOSPITAL | Age: 74
LOS: 1 days | End: 2024-03-21
Payer: MEDICARE

## 2024-03-21 ENCOUNTER — APPOINTMENT (OUTPATIENT)
Dept: OPHTHALMOLOGY | Facility: HOSPITAL | Age: 74
End: 2024-03-21

## 2024-03-21 VITALS
SYSTOLIC BLOOD PRESSURE: 139 MMHG | HEART RATE: 69 BPM | DIASTOLIC BLOOD PRESSURE: 88 MMHG | RESPIRATION RATE: 20 BRPM | OXYGEN SATURATION: 98 %

## 2024-03-21 VITALS
HEART RATE: 64 BPM | RESPIRATION RATE: 19 BRPM | TEMPERATURE: 98 F | DIASTOLIC BLOOD PRESSURE: 89 MMHG | OXYGEN SATURATION: 100 % | SYSTOLIC BLOOD PRESSURE: 122 MMHG | WEIGHT: 126.1 LBS | HEIGHT: 65 IN

## 2024-03-21 DIAGNOSIS — H25.812 COMBINED FORMS OF AGE-RELATED CATARACT, LEFT EYE: ICD-10-CM

## 2024-03-21 PROCEDURE — V2632: CPT

## 2024-03-21 PROCEDURE — 66982 XCAPSL CTRC RMVL CPLX WO ECP: CPT | Mod: LT

## 2024-03-21 PROCEDURE — 66984 XCAPSL CTRC RMVL W/O ECP: CPT | Mod: LT

## 2024-03-21 DEVICE — LENS IOL TECNIS EYHANCE DIB00 19.5D
Type: IMPLANTABLE DEVICE | Site: LEFT | Status: NON-FUNCTIONAL
Removed: 2024-03-21

## 2024-03-21 NOTE — ASU DISCHARGE PLAN (ADULT/PEDIATRIC) - CARE PROVIDER_API CALL
Alexey Villasenor)  Ophthalmology  [address] [address]  Phone: [phone]  Fax: [phone]  Follow Up Time:

## 2024-03-21 NOTE — ASU PATIENT PROFILE, ADULT - PATIENT REPRESENTATIVE: ( YOU CAN CHOOSE ANY PERSON THAT CAN ASSIST YOU WITH YOUR HEALTH CARE PREFERENCES, DOES NOT HAVE TO BE A SPOUSE, IMMEDIATE FAMILY OR SIGNIFICANT OTHER/PARTNER)
"Chief Complaint   Patient presents with     Establish Care     pt passed a kidney stone with blood in her urine and pain and pt thinks she passed the other 2 stones      initial LMP 05/01/2000  Estimated body mass index is 29.01 kg/m  as calculated from the following:    Height as of 4/4/20: 1.626 m (5' 4\").    Weight as of 4/4/20: 76.7 kg (169 lb)..  bp completed using cuff size NA (Not Taken)  ESA PALMER LPN  " Yes

## 2024-03-21 NOTE — ASU PATIENT PROFILE, ADULT - FALL HARM RISK - HARM RISK INTERVENTIONS
Assistance with ambulation/Assistance OOB with selected safe patient handling equipment/Communicate Risk of Fall with Harm to all staff/Discuss with provider need for PT consult/Monitor gait and stability/Provide patient with walking aids - walker, cane, crutches/Reinforce activity limits and safety measures with patient and family/Tailored Fall Risk Interventions/Toileting schedule using arm’s reach rule for commode and bathroom/Visual Cue: Yellow wristband and red socks/Bed in lowest position, wheels locked, appropriate side rails in place/Call bell, personal items and telephone in reach/Instruct patient to call for assistance before getting out of bed or chair/Non-slip footwear when patient is out of bed/[place] to call system/Physically safe environment - no spills, clutter or unnecessary equipment/Purposeful Proactive Rounding/Room/bathroom lighting operational, light cord in reach

## 2024-03-21 NOTE — ASU DISCHARGE PLAN (ADULT/PEDIATRIC) - CALL YOUR DOCTOR IF YOU HAVE ANY OF THE FOLLOWING:
100.4/Bleeding that does not stop/Fever greater than (need to indicate Fahrenheit or Celsius)/Wound/Surgical Site with redness, or foul smelling discharge or pus/Nausea and vomiting that does not stop

## 2024-03-21 NOTE — ASU PATIENT PROFILE, ADULT - NS PRO AD PATIENT TYPE
Spiritual Plan of Care    Pt Name: Celestina Ruvalcaba  Pt : 1967  Date: 2023    Visit Type: In person  Reason for Visit: Consult  Visited With: Patient  Length of Visit: 15 minutes    Requires Follow-up: No    Spiritual Care Consult Needed: Spiritual Care eval completed  Spiritual Care Visit Preference:     Taxonomy:    Intended Effects: Build relationship of care and support, Establish rapport and connectedness  Methods: Collaborate with care team member, Encourage self reflection, Offer support  Interventions: Active listening, Ask guided questions    Patient reported that she suffered from a fall.  She knocked and called out for help from the residents above her but to no success.  Patient expresses worry that others will break into her residence and take her belongings.  She anticipates being released tomorrow.      Patient Affect at Time of Visit: Agitated, Alert, Cooperative, Guarded, Open to  Visit, Scared  Patient Assessment: Anxious, Confident, Coping   Patient  Intervention: Empathic Listening    Patient was having lunch at the time of the visit.  She expressed tiredness in her voice and gesture:  her speech was slurred and sluggish movements.  A part of her attention was on the television.    Spiritual Plan of Care: Follow up if requested     Health Care Proxy (HCP)/Living Will

## 2024-03-22 ENCOUNTER — NON-APPOINTMENT (OUTPATIENT)
Age: 74
End: 2024-03-22

## 2024-03-22 ENCOUNTER — APPOINTMENT (OUTPATIENT)
Dept: OPHTHALMOLOGY | Facility: CLINIC | Age: 74
End: 2024-03-22
Payer: MEDICARE

## 2024-03-22 PROCEDURE — 99024 POSTOP FOLLOW-UP VISIT: CPT

## 2024-03-26 ENCOUNTER — APPOINTMENT (OUTPATIENT)
Dept: OPHTHALMOLOGY | Facility: CLINIC | Age: 74
End: 2024-03-26
Payer: MEDICARE

## 2024-03-26 ENCOUNTER — NON-APPOINTMENT (OUTPATIENT)
Age: 74
End: 2024-03-26

## 2024-03-26 PROCEDURE — 99024 POSTOP FOLLOW-UP VISIT: CPT

## 2024-05-02 ENCOUNTER — APPOINTMENT (OUTPATIENT)
Dept: OPHTHALMOLOGY | Facility: CLINIC | Age: 74
End: 2024-05-02
Payer: MEDICARE

## 2024-05-02 ENCOUNTER — NON-APPOINTMENT (OUTPATIENT)
Age: 74
End: 2024-05-02

## 2024-05-02 PROCEDURE — 99024 POSTOP FOLLOW-UP VISIT: CPT

## 2024-06-04 RX ORDER — AMLODIPINE BESYLATE 2.5 MG/1
1 TABLET ORAL
Refills: 0 | DISCHARGE

## 2024-06-04 RX ORDER — AMANTADINE HCL 100 MG
1 CAPSULE ORAL
Refills: 0 | DISCHARGE

## 2024-06-04 RX ORDER — VALBENAZINE 80 MG/1
2 CAPSULE ORAL
Refills: 0 | DISCHARGE

## 2024-06-04 RX ORDER — CHOLECALCIFEROL (VITAMIN D3) 125 MCG
1 CAPSULE ORAL
Qty: 0 | Refills: 0 | DISCHARGE

## 2024-07-31 PROBLEM — H26.9 UNSPECIFIED CATARACT: Chronic | Status: ACTIVE | Noted: 2024-03-07

## 2024-07-31 PROBLEM — E55.9 VITAMIN D DEFICIENCY, UNSPECIFIED: Chronic | Status: ACTIVE | Noted: 2022-11-05

## 2024-07-31 PROBLEM — Z86.59 PERSONAL HISTORY OF OTHER MENTAL AND BEHAVIORAL DISORDERS: Chronic | Status: ACTIVE | Noted: 2022-11-05

## 2024-07-31 PROBLEM — R73.03 PREDIABETES: Chronic | Status: ACTIVE | Noted: 2022-11-05

## 2024-07-31 PROBLEM — G24.01 DRUG INDUCED SUBACUTE DYSKINESIA: Chronic | Status: ACTIVE | Noted: 2022-11-05

## 2024-08-06 ENCOUNTER — OUTPATIENT (OUTPATIENT)
Dept: OUTPATIENT SERVICES | Facility: HOSPITAL | Age: 74
LOS: 1 days | End: 2024-08-06
Payer: MEDICARE

## 2024-08-06 VITALS
OXYGEN SATURATION: 99 % | DIASTOLIC BLOOD PRESSURE: 75 MMHG | TEMPERATURE: 98 F | SYSTOLIC BLOOD PRESSURE: 122 MMHG | HEIGHT: 70 IN | RESPIRATION RATE: 16 BRPM | HEART RATE: 69 BPM | WEIGHT: 128.97 LBS

## 2024-08-06 DIAGNOSIS — Z98.42 CATARACT EXTRACTION STATUS, LEFT EYE: Chronic | ICD-10-CM

## 2024-08-06 DIAGNOSIS — H25.811 COMBINED FORMS OF AGE-RELATED CATARACT, RIGHT EYE: ICD-10-CM

## 2024-08-06 DIAGNOSIS — Z01.818 ENCOUNTER FOR OTHER PREPROCEDURAL EXAMINATION: ICD-10-CM

## 2024-08-06 PROBLEM — H26.9 UNSPECIFIED CATARACT: Chronic | Status: INACTIVE | Noted: 2024-03-07 | Resolved: 2024-08-06

## 2024-08-06 PROBLEM — R73.03 PREDIABETES: Chronic | Status: INACTIVE | Noted: 2022-11-05 | Resolved: 2024-08-06

## 2024-08-06 PROBLEM — Z86.59 PERSONAL HISTORY OF OTHER MENTAL AND BEHAVIORAL DISORDERS: Chronic | Status: INACTIVE | Noted: 2022-10-04 | Resolved: 2024-08-06

## 2024-08-06 PROCEDURE — G0463: CPT

## 2024-08-06 NOTE — H&P PST ADULT - NSICDXPROCEDURE_GEN_ALL_CORE_FT
PROCEDURES:  Intracapsular extraction of cataract of right eye with insertion of intraocular lens 06-Aug-2024 11:40:54  Tea Stanton S

## 2024-08-06 NOTE — H&P PST ADULT - NSICDXPASTMEDICALHX_GEN_ALL_CORE_FT
PAST MEDICAL HISTORY:  Cataract, right     History of paranoid schizophrenia     Tardive dyskinesia     Tardive dyskinesia     Vitamin D deficiency

## 2024-08-06 NOTE — H&P PST ADULT - HISTORY OF PRESENT ILLNESS
72 y/o  female with PMH of Tardive dyskinesia on wheel chair followed with her daughter for PST for scheduled right  eye cataract. C/o blindness, no pain and tearing  for more than a year. Had left done in june,2024 with clear vision to left and is scheduled for right eye catarcat

## 2024-08-06 NOTE — H&P PST ADULT - ASSESSMENT
72 y/o female with right eye cataract  Planned surgery.- right  cataract extraction  Will obtain medical clearance-cbc, bmp, EKG from PMD-7/23/24-verified  Pre op instructions provided with daughter at bedside  Instructions provided on medications to continue and to take the day morning of surgery

## 2024-08-15 ENCOUNTER — APPOINTMENT (OUTPATIENT)
Dept: OPHTHALMOLOGY | Facility: HOSPITAL | Age: 74
End: 2024-08-15

## 2024-08-15 ENCOUNTER — TRANSCRIPTION ENCOUNTER (OUTPATIENT)
Age: 74
End: 2024-08-15

## 2024-08-15 ENCOUNTER — EMERGENCY (EMERGENCY)
Facility: HOSPITAL | Age: 74
LOS: 0 days | Discharge: HOME | End: 2024-08-15
Attending: STUDENT IN AN ORGANIZED HEALTH CARE EDUCATION/TRAINING PROGRAM
Payer: MEDICARE

## 2024-08-15 VITALS
OXYGEN SATURATION: 94 % | HEART RATE: 108 BPM | TEMPERATURE: 99 F | SYSTOLIC BLOOD PRESSURE: 117 MMHG | WEIGHT: 126.99 LBS | RESPIRATION RATE: 22 BRPM | DIASTOLIC BLOOD PRESSURE: 69 MMHG | HEIGHT: 66 IN

## 2024-08-15 DIAGNOSIS — R45.1 RESTLESSNESS AND AGITATION: ICD-10-CM

## 2024-08-15 DIAGNOSIS — Z88.6 ALLERGY STATUS TO ANALGESIC AGENT: ICD-10-CM

## 2024-08-15 DIAGNOSIS — Z98.42 CATARACT EXTRACTION STATUS, LEFT EYE: Chronic | ICD-10-CM

## 2024-08-15 DIAGNOSIS — F20.0 PARANOID SCHIZOPHRENIA: ICD-10-CM

## 2024-08-15 PROBLEM — H26.9 UNSPECIFIED CATARACT: Chronic | Status: ACTIVE | Noted: 2024-08-06

## 2024-08-15 PROCEDURE — 99283 EMERGENCY DEPT VISIT LOW MDM: CPT

## 2024-08-15 PROCEDURE — 66984 XCAPSL CTRC RMVL W/O ECP: CPT | Mod: RT

## 2024-08-15 NOTE — ED ADULT NURSE REASSESSMENT NOTE - NS ED NURSE REASSESS COMMENT FT1
as per RN Yareli patient's daughter at bedside said  " LETS GO" patient and daughter left w/o seeing MD Caro

## 2024-08-15 NOTE — ED ADULT NURSE NOTE - CAS EDN DISCHARGE ASSESSMENT
"Chief Complaint   Patient presents with   • Sent by MD     seen at urgent care.  states he needs CT scan for possible, \"colon infection.\"   • Diarrhea     this am with small amount of blood in stool       Triage process explained to patient.  Pt back to waiting room.  Pt instructed to inform RN if any changes or questions arise.    "
Pt discharge home. Pt given discharge instructions and prescription. Pt verbalized understanding, all questions answered ,vss upon d/c. Pt steady on feet upon discharge  Family will be driving pt home  
Received report from Sandoval WHEELER  
Report given to Patricia WHEELER to assume care of pt.  
Awake

## 2024-08-15 NOTE — ED PROVIDER NOTE - PHYSICAL EXAMINATION
VITAL SIGNS: I have reviewed nursing notes and confirm.  CONSTITUTIONAL: well-appearing, non-toxic, NAD  SKIN: Warm dry, normal skin turgor  HEAD: NCAT  EYES: EOMI, PERRLA, no scleral icterus  ENT: Moist mucous membranes, normal pharynx with no erythema or exudates  NECK: Supple; non tender. Full ROM.   CARD: RRR, no murmurs, rubs or gallops  RESP: clear to ausculation b/l.  No rales, rhonchi, or wheezing.  ABD: soft, + BS, non-tender, non-distended, no rebound or guarding. No CVA tenderness  EXT: Full ROM, no bony tenderness, no pedal edema, no calf tenderness  NEURO: normal motor. normal sensory Normal gait.  tardive dyskinesia noted perioral  movements and LUE.   PSYCH: Cooperative, appropriate.

## 2024-08-15 NOTE — ED PROVIDER NOTE - IV ALTEPLASE INCLUSION HIDDEN
----- Message from Cristi Matson sent at 4/21/2022  4:08 PM CDT -----  Pt's mother would like a return call regarding the pt's liquid diet that she is starting tomorrow. Please call back .[phone]       show

## 2024-08-15 NOTE — ED ADULT NURSE NOTE - NSFALLHARMRISKINTERV_ED_ALL_ED

## 2024-08-15 NOTE — ED PROVIDER NOTE - CLINICAL SUMMARY MEDICAL DECISION MAKING FREE TEXT BOX
74-year-old female with history of tardive dyskinesia and paranoid schizophrenia on Haldol 2 mg twice daily with recent operative procedure for cataract extraction of right eye patient was reported to receive sedation prior to procedure, was discharged within the hour after cataract extraction and was reported to have period of agitation 1 daughter attempted to get patient into car, patient was brought in by EMS to our facility and given 5 mg Versed for reported agitation per triage note.  Patient is awake alert denies any acute medical complaints at this time, as per daughter patient is back to baseline, and attributes her symptoms likely related to sedation from her operative procedure today.  No other reported complaints.    Patient is well-appearing at baseline, with reported buttock Toradol dyskinesia, symptoms quickly alleviated, patient is at baseline neurologic status, daughter at bedside, reports patient was discharged within an hour after receiving cataract removal surgery of right eye and received sedation for procedure.  Symptoms of agitation episode likely related to sedation from procedure.

## 2024-08-15 NOTE — ED ADULT NURSE NOTE - NSFALLASSISTNEEDED_ED_ALL_ED
Standing/Walking Carac Counseling:  I discussed with the patient the risks of Carac including but not limited to erythema, scaling, itching, weeping, crusting, and pain.

## 2024-08-15 NOTE — ED ADULT TRIAGE NOTE - CHIEF COMPLAINT QUOTE
as per EMS " Today after cataract surgery, patient was refusing to get into daughter's car, confused and disoriented, running around and resisting the EMS staff. Given 5 mg versed for agitation. Patient  noted A/ox 2, and groggy in triage [ Hx of schizophrenia, bipolar, tardive dyskinesia, HTN]

## 2024-08-15 NOTE — ED PROVIDER NOTE - PATIENT PORTAL LINK FT
You can access the FollowMyHealth Patient Portal offered by [facility] by registering at the following website: [email or web address]. By joining [employer]’s FollowMyHealth portal, you will also be able to view your health information using other applications (apps) compatible with our system.

## 2024-08-15 NOTE — ED PROVIDER NOTE - OBJECTIVE STATEMENT
74-year-old female with history of tardive dyskinesia and paranoid schizophrenia on Haldol 2 mg twice daily with recent operative procedure for cataract extraction of right eye patient was reported to receive sedation prior to procedure, was discharged within the hour after cataract extraction and was reported to have period of agitation 1 daughter attempted to get patient into car, patient was brought in by EMS to our facility and given 5 mg Versed for reported agitation per triage note.  Patient is awake alert denies any acute medical complaints at this time, as per daughter patient is back to baseline, and attributes her symptoms likely related to sedation from her operative procedure today.  No other reported complaints.

## 2024-08-16 ENCOUNTER — NON-APPOINTMENT (OUTPATIENT)
Age: 74
End: 2024-08-16

## 2024-08-16 ENCOUNTER — APPOINTMENT (OUTPATIENT)
Dept: OPHTHALMOLOGY | Facility: CLINIC | Age: 74
End: 2024-08-16
Payer: MEDICARE

## 2024-08-16 PROCEDURE — 99024 POSTOP FOLLOW-UP VISIT: CPT

## 2024-08-22 ENCOUNTER — NON-APPOINTMENT (OUTPATIENT)
Age: 74
End: 2024-08-22

## 2024-08-22 ENCOUNTER — APPOINTMENT (OUTPATIENT)
Dept: OPHTHALMOLOGY | Facility: CLINIC | Age: 74
End: 2024-08-22
Payer: MEDICARE

## 2024-08-22 PROCEDURE — 99024 POSTOP FOLLOW-UP VISIT: CPT

## 2024-09-10 ENCOUNTER — NON-APPOINTMENT (OUTPATIENT)
Age: 74
End: 2024-09-10

## 2024-09-10 ENCOUNTER — APPOINTMENT (OUTPATIENT)
Dept: OPHTHALMOLOGY | Facility: CLINIC | Age: 74
End: 2024-09-10
Payer: MEDICARE

## 2024-09-10 PROCEDURE — 99024 POSTOP FOLLOW-UP VISIT: CPT

## 2024-09-28 NOTE — BH INPATIENT PSYCHIATRY PROGRESS NOTE - NSBHFUPINTERVALHXFT_PSY_A_CORE
Chart reviewed and case discussed with treatment team. Staff report patient continues to refuse medications. She remains malodorous despite staff confirming they helped her shower. Patient is defensive about being told she has poor hygiene. Patient continues to be paranoid about her landlord and she becomes very easily paranoid with anyone that disagrees with her or supports her treatment. She is in gross denial about her needs, particularly with her TD causing her to be a fall risk - she has trouble walking without grabbing on to rails in the hallway. She declines any assistance when walking. Nursing staff report she has been managing to feed herself adequately. Assessment is very difficult due to patient completely dominating conversation.    REQUIRED- Click to run Sepsis Recognition Calculator

## 2024-11-05 NOTE — BH INPATIENT PSYCHIATRY PROGRESS NOTE - MSE UNSTRUCTURED FT
Plan: Patient will continue home health wound care 3x weekly as she was doing in [place] per Wound Care physician. If not improved by insurance for home health care, she will come Friday for wound care. Detail Level: Zone Appearance: appears stated age; thin; TD of LE, hands, oral muscles; poor dentition but better oral hygiene  Behavior: remains guarded, defensive, rigit; was sleeping in chair on approach  Speech: articulation remains poor, more loud and pressured when irritated or defensive  Mood: denies complaints despite obvious dissatisfaction with being here against her wishes  Affect: irritable, constricted, stable  Thought process: illogical, concrete, disorganized  Thought content: paranoid delusions continue, about her landlord, about this writer and at times other staff, no reports of SI  Perceptual disturbances: no appearance of internal preoccupation  Orientation: unable to ascertain due to poor cooperation; no obvious deficits in recall  Abstraction: concrete  Fund of knowledge: limited  Insight: poor  Judgement: poor     Appearance: thin habitus, appears stated age; TD of LE, hands, oral muscles - affects gait but improved  Behavior: remains guarded, defensive, superficial  Speech: articulation remains poor due to poor dentition and TD; otherwise wnl; tends to talk over this writer at times  Mood: denies complaints   Affect: irritable - superficially and incongruent to subjective report, constricted, stable  Thought process: illogical, concrete, disorganized  Thought content: paranoid delusions continue, no reports of SI  Perceptual disturbances: no appearance of internal preoccupation  Orientation: unable to ascertain due to poor cooperation; no obvious deficits in recall  Abstraction: concrete  Fund of knowledge: limited  Insight: poor  Judgement: poor

## 2025-02-10 NOTE — BH INPATIENT PSYCHIATRY PROGRESS NOTE - NSBHMETABOLIC_PSY_ALL_CORE_FT
Virtual appt scheduled with Unique Negrete on 2/20/2025  Bonita Gimenez, TC     BMI: BMI (kg/m2): 19.6 (09-10-22 @ 15:45)  HbA1c: A1C with Estimated Average Glucose Result: 5.9 % (06-19-22 @ 08:45)    Glucose:   BP: --  Lipid Panel: Date/Time: 06-19-22 @ 08:45  Cholesterol, Serum: 165  Direct LDL: --  HDL Cholesterol, Serum: 64  Total Cholesterol/HDL Ration Measurement: --  Triglycerides, Serum: 58

## 2025-03-31 ENCOUNTER — INPATIENT (INPATIENT)
Facility: HOSPITAL | Age: 75
LOS: 2 days | Discharge: NURSING, REHAB OR LONG-TERM CARE | End: 2025-04-03
Attending: STUDENT IN AN ORGANIZED HEALTH CARE EDUCATION/TRAINING PROGRAM | Admitting: STUDENT IN AN ORGANIZED HEALTH CARE EDUCATION/TRAINING PROGRAM
Payer: MEDICARE

## 2025-03-31 VITALS
RESPIRATION RATE: 18 BRPM | OXYGEN SATURATION: 100 % | TEMPERATURE: 99 F | DIASTOLIC BLOOD PRESSURE: 77 MMHG | HEART RATE: 82 BPM | SYSTOLIC BLOOD PRESSURE: 138 MMHG

## 2025-03-31 DIAGNOSIS — Z98.42 CATARACT EXTRACTION STATUS, LEFT EYE: Chronic | ICD-10-CM

## 2025-03-31 DIAGNOSIS — T14.8XXA OTHER INJURY OF UNSPECIFIED BODY REGION, INITIAL ENCOUNTER: ICD-10-CM

## 2025-03-31 LAB
ALBUMIN SERPL ELPH-MCNC: 3.8 G/DL — SIGNIFICANT CHANGE UP (ref 3.3–5)
ALP SERPL-CCNC: 112 U/L — SIGNIFICANT CHANGE UP (ref 40–120)
ALT FLD-CCNC: 21 U/L — SIGNIFICANT CHANGE UP (ref 4–33)
ANION GAP SERPL CALC-SCNC: 12 MMOL/L — SIGNIFICANT CHANGE UP (ref 7–14)
ANION GAP SERPL CALC-SCNC: 12 MMOL/L — SIGNIFICANT CHANGE UP (ref 7–14)
AST SERPL-CCNC: 55 U/L — HIGH (ref 4–32)
BASOPHILS # BLD AUTO: 0.04 K/UL — SIGNIFICANT CHANGE UP (ref 0–0.2)
BASOPHILS NFR BLD AUTO: 0.8 % — SIGNIFICANT CHANGE UP (ref 0–2)
BILIRUB SERPL-MCNC: 0.3 MG/DL — SIGNIFICANT CHANGE UP (ref 0.2–1.2)
BUN SERPL-MCNC: 18 MG/DL — SIGNIFICANT CHANGE UP (ref 7–23)
BUN SERPL-MCNC: 19 MG/DL — SIGNIFICANT CHANGE UP (ref 7–23)
CALCIUM SERPL-MCNC: 9.5 MG/DL — SIGNIFICANT CHANGE UP (ref 8.4–10.5)
CALCIUM SERPL-MCNC: 9.6 MG/DL — SIGNIFICANT CHANGE UP (ref 8.4–10.5)
CHLORIDE SERPL-SCNC: 104 MMOL/L — SIGNIFICANT CHANGE UP (ref 98–107)
CHLORIDE SERPL-SCNC: 104 MMOL/L — SIGNIFICANT CHANGE UP (ref 98–107)
CO2 SERPL-SCNC: 19 MMOL/L — LOW (ref 22–31)
CO2 SERPL-SCNC: 25 MMOL/L — SIGNIFICANT CHANGE UP (ref 22–31)
CREAT SERPL-MCNC: 0.87 MG/DL — SIGNIFICANT CHANGE UP (ref 0.5–1.3)
CREAT SERPL-MCNC: 0.91 MG/DL — SIGNIFICANT CHANGE UP (ref 0.5–1.3)
EGFR: 66 ML/MIN/1.73M2 — SIGNIFICANT CHANGE UP
EGFR: 66 ML/MIN/1.73M2 — SIGNIFICANT CHANGE UP
EGFR: 70 ML/MIN/1.73M2 — SIGNIFICANT CHANGE UP
EGFR: 70 ML/MIN/1.73M2 — SIGNIFICANT CHANGE UP
EOSINOPHIL # BLD AUTO: 0.08 K/UL — SIGNIFICANT CHANGE UP (ref 0–0.5)
EOSINOPHIL NFR BLD AUTO: 1.6 % — SIGNIFICANT CHANGE UP (ref 0–6)
GLUCOSE SERPL-MCNC: 114 MG/DL — HIGH (ref 70–99)
GLUCOSE SERPL-MCNC: 141 MG/DL — HIGH (ref 70–99)
HCT VFR BLD CALC: 38.8 % — SIGNIFICANT CHANGE UP (ref 34.5–45)
HGB BLD-MCNC: 12.3 G/DL — SIGNIFICANT CHANGE UP (ref 11.5–15.5)
IANC: 2.63 K/UL — SIGNIFICANT CHANGE UP (ref 1.8–7.4)
IMM GRANULOCYTES NFR BLD AUTO: 0.4 % — SIGNIFICANT CHANGE UP (ref 0–0.9)
LYMPHOCYTES # BLD AUTO: 1.73 K/UL — SIGNIFICANT CHANGE UP (ref 1–3.3)
LYMPHOCYTES # BLD AUTO: 33.7 % — SIGNIFICANT CHANGE UP (ref 13–44)
MCHC RBC-ENTMCNC: 28.5 PG — SIGNIFICANT CHANGE UP (ref 27–34)
MCHC RBC-ENTMCNC: 31.7 G/DL — LOW (ref 32–36)
MCV RBC AUTO: 89.8 FL — SIGNIFICANT CHANGE UP (ref 80–100)
MONOCYTES # BLD AUTO: 0.64 K/UL — SIGNIFICANT CHANGE UP (ref 0–0.9)
MONOCYTES NFR BLD AUTO: 12.5 % — SIGNIFICANT CHANGE UP (ref 2–14)
NEUTROPHILS # BLD AUTO: 2.63 K/UL — SIGNIFICANT CHANGE UP (ref 1.8–7.4)
NEUTROPHILS NFR BLD AUTO: 51 % — SIGNIFICANT CHANGE UP (ref 43–77)
NRBC # BLD AUTO: 0 K/UL — SIGNIFICANT CHANGE UP (ref 0–0)
NRBC # FLD: 0 K/UL — SIGNIFICANT CHANGE UP (ref 0–0)
NRBC BLD AUTO-RTO: 0 /100 WBCS — SIGNIFICANT CHANGE UP (ref 0–0)
PLATELET # BLD AUTO: 249 K/UL — SIGNIFICANT CHANGE UP (ref 150–400)
POTASSIUM SERPL-MCNC: 4.3 MMOL/L — SIGNIFICANT CHANGE UP (ref 3.5–5.3)
POTASSIUM SERPL-MCNC: 5.9 MMOL/L — HIGH (ref 3.5–5.3)
POTASSIUM SERPL-SCNC: 4.3 MMOL/L — SIGNIFICANT CHANGE UP (ref 3.5–5.3)
POTASSIUM SERPL-SCNC: 5.9 MMOL/L — HIGH (ref 3.5–5.3)
PROT SERPL-MCNC: 7.9 G/DL — SIGNIFICANT CHANGE UP (ref 6–8.3)
RBC # BLD: 4.32 M/UL — SIGNIFICANT CHANGE UP (ref 3.8–5.2)
RBC # FLD: 14.5 % — SIGNIFICANT CHANGE UP (ref 10.3–14.5)
SODIUM SERPL-SCNC: 135 MMOL/L — SIGNIFICANT CHANGE UP (ref 135–145)
SODIUM SERPL-SCNC: 141 MMOL/L — SIGNIFICANT CHANGE UP (ref 135–145)
WBC # BLD: 5.14 K/UL — SIGNIFICANT CHANGE UP (ref 3.8–10.5)
WBC # FLD AUTO: 5.14 K/UL — SIGNIFICANT CHANGE UP (ref 3.8–10.5)

## 2025-03-31 PROCEDURE — 71046 X-RAY EXAM CHEST 2 VIEWS: CPT | Mod: 26

## 2025-03-31 PROCEDURE — 99285 EMERGENCY DEPT VISIT HI MDM: CPT

## 2025-03-31 PROCEDURE — 73030 X-RAY EXAM OF SHOULDER: CPT | Mod: 26,LT

## 2025-03-31 PROCEDURE — 70450 CT HEAD/BRAIN W/O DYE: CPT | Mod: 26

## 2025-03-31 RX ORDER — ACETAMINOPHEN 500 MG/5ML
650 LIQUID (ML) ORAL ONCE
Refills: 0 | Status: COMPLETED | OUTPATIENT
Start: 2025-03-31 | End: 2025-03-31

## 2025-03-31 RX ADMIN — Medication 650 MILLIGRAM(S): at 12:18

## 2025-03-31 RX ADMIN — Medication 650 MILLIGRAM(S): at 12:48

## 2025-03-31 NOTE — ED PROVIDER NOTE - WR ORDER STATUS 2
Resulted

## 2025-03-31 NOTE — PROVIDER CONTACT NOTE (OTHER) - ASSESSMENT
Patient was brought to ER by son and daughter from [facility] asking to transfer to [facility]. Per son Nadeem ([phone]) pt is accepted at [facility]. ASTRID spoke with Ewa (Admissions)  [phone] at [facility] and was told pt can not be accepted tonight. Pt has to meet Medicare Requirement of 3 Nights Stay in the Hospital prior to Nursing Home Placement. Discussed with Provider and asked for PT Eval, and recommended admission. ASTRID will f/u. Updated pt, son and daughter.

## 2025-03-31 NOTE — ED PROVIDER NOTE - PROGRESS NOTE DETAILS
[place], PGY2    Patient reassessed, pain improved.  X-rays negative.  Arranging for discharge however [place] [facility] new facility that is excepting them, [place].  Family will go to [facility], spoke to our social work however [place] stating they need 3-day hospital stay prior to admission.  Will admit to the hospitalist for social admission.

## 2025-03-31 NOTE — ED PROVIDER NOTE - OBJECTIVE STATEMENT
75-year-old female past medical history of paranoid schizophrenia, tardive dyskinesia on Haldol 2 mg twice daily presents ED from [facility] for left shoulder pain after a fall.  Patient had unwitnessed fall 4 days ago, was in the shower went to bend down, slipped struck left shoulder, denies head strike denies LOC, was amatory afterwards.  Complaining of persistent left shoulder pain endorsed to daughter today prompting visit to ED.  Otherwise denies any prodromal symptoms prior. Denies fevers, chills, nausea, vomiting, dizziness, chest pain, SOB, abdominal pain, dysuria, hematuria.

## 2025-03-31 NOTE — ED PROVIDER NOTE - ATTENDING CONTRIBUTION TO CARE
I performed a history and physical exam of the patient and discussed their management with the resident and /or advanced care provider. I reviewed the resident and /or ACP's note and agree with the documented findings and plan of care. My medical decision making and observations are found above.  Lungs clear, abd soft, moving lt shoulder, abrasion over lt scapula

## 2025-03-31 NOTE — ED PROVIDER NOTE - NSFOLLOWUPINSTRUCTIONS_ED_ALL_ED_FT
You were seen in the ED for a fall. You were evaluated with Xrays and a CT scan. The results are attached below. No further intervention is required in the ED. Please return to the ED for worsening symptoms, this includes worsening pain not improved with medications, difficulty ranging your shoulder, scabbing, streaking, draining from the site, headaches dizziness weakness.     Fall Prevention    WHAT YOU NEED TO KNOW:    Fall prevention includes ways to make your home and other areas safer. It also includes ways you can move more carefully to prevent a fall. Health conditions that cause changes in your blood pressure, vision, or muscle strength and coordination may increase your risk for falls. Medicines may also increase your risk for falls if they make you dizzy, weak, or sleepy.     DISCHARGE INSTRUCTIONS:    Call 911 or have someone else call if:     You have fallen and are unconscious.      You have fallen and cannot move part of your body.    Contact your healthcare provider if:     You have fallen and have pain or a headache.      You have questions or concerns about your condition or care.    Fall prevention tips:     Stand or sit up slowly. This may help you keep your balance and prevent falls.      Use assistive devices as directed. Your healthcare provider may suggest that you use a cane or walker to help you keep your balance. You may need to have grab bars put in your bathroom near the toilet or in the shower.      Wear shoes that fit well and have soles that . Wear shoes both inside and outside. Use slippers with good . Do not wear shoes with high heels.      Wear a personal alarm. This is a device that allows you to call 911 if you fall and need help. Ask your healthcare provider for more information.      Stay active. Exercise can help strengthen your muscles and improve your balance. Your healthcare provider may recommend water aerobics or walking. He or she may also recommend physical therapy to improve your coordination. Never start an exercise program without talking to your healthcare provider first.       Manage your medical conditions. Keep all appointments with your healthcare providers. Visit your eye doctor as directed.    Home safety tips:     Add items to prevent falls in the bathroom. Put nonslip strips on your bath or shower floor to prevent you from slipping. Use a bath mat if you do not have carpet in the bathroom. This will prevent you from falling when you step out of the bath or shower. Use a shower seat so you do not need to stand while you shower. Sit on the toilet or a chair in your bathroom to dry yourself and put on clothing. This will prevent you from losing your balance from drying or dressing yourself while you are standing.       Keep paths clear. Remove books, shoes, and other objects from walkways and stairs. Place cords for telephones and lamps out of the way so that you do not need to walk over them. Tape them down if you cannot move them. Remove small rugs. If you cannot remove a rug, secure it with double-sided tape. This will prevent you from tripping.       Install bright lights in your home. Use night lights to help light paths to the bathroom or kitchen. Always turn on the light before you start walking.      Keep items you use often on shelves within reach. Do not use a step stool to help you reach an item.      Paint or place reflective tape on the edges of your stairs. This will help you see the stairs better.    Follow up with your healthcare provider as directed: Write down your questions so you remember to ask them during your visits.

## 2025-03-31 NOTE — ED PROVIDER NOTE - CLINICAL SUMMARY MEDICAL DECISION MAKING FREE TEXT BOX
75-year-old female past medical history of paranoid schizophrenia, tardive dyskinesia on Haldol 2 mg twice daily presents ED from [facility] [place] [facility] for left shoulder pain after a fall.  Patient had unwitnessed fall 4 days ago, was in the shower went to bend down, slipped struck left shoulder, denies head strike denies LOC, was amatory afterwards.  Complaining of persistent left shoulder pain endorsed to daughter today prompting visit to ED.  Otherwise denies any prodromal symptoms prior. Denies fevers, chills, nausea, vomiting, dizziness, chest pain, SOB, abdominal pain, dysuria, hematuria.       VSS. Clinically stable. PE, well appearing, no acute distress, AAOx3. NCAT, no ctl spinal ttp, no step offs or crepitus, PERRL, EOMI, normal conjunctiva, facial twitching, mucous membranes moist, LCTAB no w/r/c, no MRG, RRR, abd NDNT, no rebound tenderness or guarding, no CVA ttp, no focal neuro deficits, neurovascularly intact, no rashes, or erythema L shoulder w well healing 1cm circumferential scab to L posterior shoulder. full ROM of b/l UE. Suspicion for bone bruise, muscle contusion, low suspicion for fx vs dislocation, will get XRs, pain meds, CT Head given unwitnessed fall. likely DC 75-year-old female past medical history of paranoid schizophrenia, tardive dyskinesia on Haldol 2 mg twice daily presents ED from [facility] [place] for left shoulder pain after a fall.  Patient had unwitnessed fall 4 days ago, was in the shower went to bend down, slipped struck left shoulder, denies head strike denies LOC, was amatory afterwards.  Complaining of persistent left shoulder pain endorsed to daughter today prompting visit to ED.  Otherwise denies any prodromal symptoms prior. Denies fevers, chills, nausea, vomiting, dizziness, chest pain, SOB, abdominal pain, dysuria, hematuria.     VSS. Clinically stable. PE, well appearing, no acute distress, AAOx3. NCAT, no ctl spinal ttp, no step offs or crepitus, PERRL, EOMI, normal conjunctiva, facial twitching, mucous membranes moist, LCTAB no w/r/c, no MRG, RRR, abd NDNT, no rebound tenderness or guarding, no CVA ttp, no focal neuro deficits, neurovascularly intact, no rashes, or erythema L shoulder w well healing 1cm circumferential scab to L posterior shoulder. full ROM of b/l UE. Suspicion for bone bruise, muscle contusion, low suspicion for fx vs dislocation, will get XRs, pain meds, CT Head given unwitnessed fall. likely DC    Jose: Patient is a 75-year-old who fell 5 or 6 days ago injured her left shoulder and has bruising along her back as well as an abrasion.  Abrasion with no signs of infection.  Will check on tetanus status.  Patient moving left arm because of her tardive dyskinesia but it hurts more than usual.  Will get chest x-ray and shoulder x-rays looking for fracture.  Will treat patient's pain.

## 2025-03-31 NOTE — ED ADULT NURSE NOTE - OBJECTIVE STATEMENT
A&Ox4. ambulatory. c/o contusion to left shoulder s/p unwitnessed fall one week ago. unknown head strike. HX tardive dyskinsia & schizophrenia.  NAD . pt denies SOB, chest pain, dizziness, weakness, urinary symptoms, HA, n/v/d, fevers, chills, LOC. respirations are even and unlabored. ABD is soft and nontender. ecchymosis observed to bilateral posterior shoulders. abrasion observed to left shoulder.  no active bleeding at this time. positive pulse, motor and sensory to bilateral upper extremities. skin intact. call bell at bedside. safety precautions maintained.

## 2025-03-31 NOTE — ED PROVIDER NOTE - PHYSICAL EXAMINATION
Gen: AAOx3, non-toxic  Head: NCAT. no ctl spinal ttp no step offs or crepitus  HEENT: EOMI, PERRL, oral mucosa moist, normal conjunctiva  Lung: CTAB, no respiratory distress, no wheezes/rhonchi/rales B/L,   CV: RRR, no murmurs, rubs or gallops  Abd: soft, NTND, no guarding, no CVA tenderness  MSK: no visible deformities. L shoulder w well healing 1cm circumferential scab to L posterior shoulder. full ROM of b/l UE  Neuro: No focal sensory or motor deficits  Skin: Warm, well perfused, no rash  Psych: normal affect.

## 2025-03-31 NOTE — ED PROVIDER NOTE - PATIENT PORTAL LINK FT
You can access the FollowMyHealth Patient Portal offered by [facility] by registering at the following website: [email or web address]. By joining [email or web address]’s FollowMyHealth portal, you will also be able to view your health information using other applications (apps) compatible with our system.

## 2025-04-01 DIAGNOSIS — F20.0 PARANOID SCHIZOPHRENIA: ICD-10-CM

## 2025-04-01 DIAGNOSIS — M25.512 PAIN IN LEFT SHOULDER: ICD-10-CM
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
## 2025-04-01 DIAGNOSIS — Z29.9 ENCOUNTER FOR PROPHYLACTIC MEASURES, UNSPECIFIED: ICD-10-CM

## 2025-04-01 DIAGNOSIS — Z79.899 OTHER LONG TERM (CURRENT) DRUG THERAPY: ICD-10-CM

## 2025-04-01 DIAGNOSIS — G24.01 DRUG INDUCED SUBACUTE DYSKINESIA: ICD-10-CM

## 2025-04-01 DIAGNOSIS — R42 DIZZINESS AND GIDDINESS: ICD-10-CM

## 2025-04-01 DIAGNOSIS — Z60.9 PROBLEM RELATED TO SOCIAL ENVIRONMENT, UNSPECIFIED: ICD-10-CM

## 2025-04-01 LAB
AMPHET UR-MCNC: NEGATIVE — SIGNIFICANT CHANGE UP
APPEARANCE UR: CLEAR — SIGNIFICANT CHANGE UP
BACTERIA # UR AUTO: NEGATIVE /HPF — SIGNIFICANT CHANGE UP
BARBITURATES UR SCN-MCNC: NEGATIVE — SIGNIFICANT CHANGE UP
BENZODIAZ UR-MCNC: NEGATIVE — SIGNIFICANT CHANGE UP
BILIRUB UR-MCNC: NEGATIVE — SIGNIFICANT CHANGE UP
CAST: 0 /LPF — SIGNIFICANT CHANGE UP (ref 0–4)
COCAINE METAB.OTHER UR-MCNC: NEGATIVE — SIGNIFICANT CHANGE UP
COLOR SPEC: YELLOW — SIGNIFICANT CHANGE UP
CREATININE URINE RESULT, DAU: 86 MG/DL — SIGNIFICANT CHANGE UP
DIFF PNL FLD: NEGATIVE — SIGNIFICANT CHANGE UP
FENTANYL UR QL SCN: NEGATIVE — SIGNIFICANT CHANGE UP
GLUCOSE UR QL: NEGATIVE MG/DL — SIGNIFICANT CHANGE UP
KETONES UR-MCNC: NEGATIVE MG/DL — SIGNIFICANT CHANGE UP
LEUKOCYTE ESTERASE UR-ACNC: ABNORMAL
METHADONE UR-MCNC: NEGATIVE — SIGNIFICANT CHANGE UP
NITRITE UR-MCNC: NEGATIVE — SIGNIFICANT CHANGE UP
OPIATES UR-MCNC: NEGATIVE — SIGNIFICANT CHANGE UP
OXYCODONE UR-MCNC: NEGATIVE — SIGNIFICANT CHANGE UP
PCP SPEC-MCNC: SIGNIFICANT CHANGE UP
PH UR: 7 — SIGNIFICANT CHANGE UP (ref 5–8)
PROT UR-MCNC: NEGATIVE MG/DL — SIGNIFICANT CHANGE UP
RBC CASTS # UR COMP ASSIST: 0 /HPF — SIGNIFICANT CHANGE UP (ref 0–4)
SP GR SPEC: 1.01 — SIGNIFICANT CHANGE UP (ref 1–1.03)
SQUAMOUS # UR AUTO: 0 /HPF — SIGNIFICANT CHANGE UP (ref 0–5)
THC UR QL: NEGATIVE — SIGNIFICANT CHANGE UP
UROBILINOGEN FLD QL: 1 MG/DL — SIGNIFICANT CHANGE UP (ref 0.2–1)
WBC UR QL: 2 /HPF — SIGNIFICANT CHANGE UP (ref 0–5)

## 2025-04-01 PROCEDURE — 99223 1ST HOSP IP/OBS HIGH 75: CPT

## 2025-04-01 RX ORDER — INFLUENZA A VIRUS A/IDAHO/07/2018 (H1N1) ANTIGEN (MDCK CELL DERIVED, PROPIOLACTONE INACTIVATED, INFLUENZA A VIRUS A/INDIANA/08/2018 (H3N2) ANTIGEN (MDCK CELL DERIVED, PROPIOLACTONE INACTIVATED), INFLUENZA B VIRUS B/SINGAPORE/INFTT-16-0610/2016 ANTIGEN (MDCK CELL DERIVED, PROPIOLACTONE INACTIVATED), INFLUENZA B VIRUS B/IOWA/06/2017 ANTIGEN (MDCK CELL DERIVED, PROPIOLACTONE INACTIVATED) 15; 15; 15; 15 UG/.5ML; UG/.5ML; UG/.5ML; UG/.5ML
0.5 INJECTION, SUSPENSION INTRAMUSCULAR ONCE
Refills: 0 | Status: DISCONTINUED | OUTPATIENT
Start: 2025-04-01 | End: 2025-04-03

## 2025-04-01 RX ORDER — ONDANSETRON HCL/PF 4 MG/2 ML
4 VIAL (ML) INJECTION EVERY 8 HOURS
Refills: 0 | Status: DISCONTINUED | OUTPATIENT
Start: 2025-04-01 | End: 2025-04-03

## 2025-04-01 RX ORDER — OXYCODONE HYDROCHLORIDE 30 MG/1
2.5 TABLET ORAL EVERY 6 HOURS
Refills: 0 | Status: DISCONTINUED | OUTPATIENT
Start: 2025-04-01 | End: 2025-04-03

## 2025-04-01 RX ORDER — AMLODIPINE BESYLATE 10 MG/1
5 TABLET ORAL DAILY
Refills: 0 | Status: DISCONTINUED | OUTPATIENT
Start: 2025-04-01 | End: 2025-04-03

## 2025-04-01 RX ORDER — VALBENAZINE 80 MG/1
80 CAPSULE ORAL DAILY
Refills: 0 | Status: DISCONTINUED | OUTPATIENT
Start: 2025-04-01 | End: 2025-04-03

## 2025-04-01 RX ORDER — ACETAMINOPHEN 500 MG/5ML
650 LIQUID (ML) ORAL ONCE
Refills: 0 | Status: COMPLETED | OUTPATIENT
Start: 2025-04-01 | End: 2025-04-01

## 2025-04-01 RX ORDER — AMANTADINE HCL 100 MG
2 CAPSULE ORAL
Refills: 0 | DISCHARGE

## 2025-04-01 RX ORDER — MELATONIN 5 MG
3 TABLET ORAL AT BEDTIME
Refills: 0 | Status: DISCONTINUED | OUTPATIENT
Start: 2025-04-01 | End: 2025-04-03

## 2025-04-01 RX ORDER — VALBENAZINE 80 MG/1
1 CAPSULE ORAL
Refills: 0 | DISCHARGE

## 2025-04-01 RX ORDER — ENOXAPARIN SODIUM 100 MG/ML
40 INJECTION SUBCUTANEOUS EVERY 24 HOURS
Refills: 0 | Status: DISCONTINUED | OUTPATIENT
Start: 2025-04-01 | End: 2025-04-03

## 2025-04-01 RX ORDER — AMANTADINE HCL 100 MG
200 CAPSULE ORAL
Refills: 0 | Status: DISCONTINUED | OUTPATIENT
Start: 2025-04-01 | End: 2025-04-03

## 2025-04-01 RX ORDER — LIDOCAINE HYDROCHLORIDE 20 MG/ML
1 JELLY TOPICAL ONCE
Refills: 0 | Status: COMPLETED | OUTPATIENT
Start: 2025-04-01 | End: 2025-04-01

## 2025-04-01 RX ORDER — LIDOCAINE HYDROCHLORIDE 20 MG/ML
1 JELLY TOPICAL DAILY
Refills: 0 | Status: DISCONTINUED | OUTPATIENT
Start: 2025-04-01 | End: 2025-04-03

## 2025-04-01 RX ORDER — ACETAMINOPHEN 500 MG/5ML
650 LIQUID (ML) ORAL EVERY 6 HOURS
Refills: 0 | Status: DISCONTINUED | OUTPATIENT
Start: 2025-04-01 | End: 2025-04-03

## 2025-04-01 RX ADMIN — Medication 650 MILLIGRAM(S): at 01:23

## 2025-04-01 RX ADMIN — LIDOCAINE HYDROCHLORIDE 1 PATCH: 20 JELLY TOPICAL at 14:00

## 2025-04-01 RX ADMIN — ENOXAPARIN SODIUM 40 MILLIGRAM(S): 100 INJECTION SUBCUTANEOUS at 11:35

## 2025-04-01 RX ADMIN — LIDOCAINE HYDROCHLORIDE 1 PATCH: 20 JELLY TOPICAL at 02:42

## 2025-04-01 RX ADMIN — LIDOCAINE HYDROCHLORIDE 1 PATCH: 20 JELLY TOPICAL at 07:00

## 2025-04-01 RX ADMIN — Medication 650 MILLIGRAM(S): at 00:23

## 2025-04-01 RX ADMIN — LIDOCAINE HYDROCHLORIDE 1 PATCH: 20 JELLY TOPICAL at 11:34

## 2025-04-01 SDOH — SOCIAL STABILITY - SOCIAL INSECURITY: PROBLEM RELATED TO SOCIAL ENVIRONMENT, UNSPECIFIED: Z60.9

## 2025-04-01 NOTE — H&P ADULT - NSHPLABSRESULTS_GEN_ALL_CORE
.    -Personally INTERPRETED EKG: NSR bpm, QTc=, no acute Tw or ST changes, no PACs or PVCs    -Personally INTERPRETED CXR: clear lungs, no pleural effusions, no PTX      -Personally reviewed the following labs below:                        12.3   5.14  )-----------( 249      ( 31 Mar 2025 19:00 )             38.8     03-31    141  |  104  |  18  ----------------------------<  114[H]  4.3   |  25  |  0.91    Ca    9.5      31 Mar 2025 19:55    TPro  7.9  /  Alb  3.8  /  TBili  0.3  /  DBili  x   /  AST  55[H]  /  ALT  21  /  AlkPhos  112  03-31      -Personally reviewed: XR SHOULDER COMP MIN 2V LT   ORDERED BY: MARITO HARRIS     PROCEDURE DATE:  03/31/2025    INTERPRETATION:  Clinical indication: Shoulder pain.  3 views of the left shoulder were performed.    IMPRESSION:  No evidence of acute fracture or dislocation. Mild glenohumeral   arthrosis. Moderate acromial clavicular joint arthrosis. These change   along the greater tuberosity. Loose body in the subacromial/subdeltoid   bursal region. Chronic healed deformity of the left anterolateral third   rib.      -Personally reviewed: CT BRAIN ORDERED BY: MARITO HARRIS    PROCEDURE DATE: 03/31/2025  INTERPRETATION: CLINICAL INFORMATION: fall  FINDINGS:  VENTRICLES AND SULCI: Age appropriate involutional changes.  INTRA-AXIAL: No mass effect, acute hemorrhage, or midline shift. There are periventricular and subcortical white matter hypodensities, consistent with microvascular type changes. Chronic infarcts versus prominent perivascular space in the left basal ganglia.  EXTRA-AXIAL: No mass or fluid collection. Basal cisterns are normal in appearance.  VISUALIZED SINUSES: Scattered mucosal thickening.  TYMPANOMASTOID CAVITIES: Clear.  VISUALIZED ORBITS: Bilateral lens replacement.  CALVARIUM: Intact.  MISCELLANEOUS: None.  IMPRESSION:  1. No acute intracranial hemorrhage, mass effect, or midline shift.  2. Chronic small vessel disease. .    -Personally INTERPRETED EKG: NSR bpm, QTc=, no acute Tw or ST changes, no PACs or PVCs    -Personally INTERPRETED CXR: clear lungs, no pleural effusions, no PTX    -Personally reviewed the following labs below:                        12.3   5.14  )-----------( 249      ( 31 Mar 2025 19:00 )             38.8     03-31    141  |  104  |  18  ----------------------------<  114[H]  4.3   |  25  |  0.91    Ca    9.5      31 Mar 2025 19:55    TPro  7.9  /  Alb  3.8  /  TBili  0.3  /  DBili  x   /  AST  55[H]  /  ALT  21  /  AlkPhos  112  03-31      -Personally reviewed: XR SHOULDER COMP MIN 2V LT   ORDERED BY: MARITO HARRIS     PROCEDURE DATE:  03/31/2025    INTERPRETATION:  Clinical indication: Shoulder pain.  3 views of the left shoulder were performed.    IMPRESSION:  No evidence of acute fracture or dislocation. Mild glenohumeral   arthrosis. Moderate acromial clavicular joint arthrosis. These change   along the greater tuberosity. Loose body in the subacromial/subdeltoid   bursal region. Chronic healed deformity of the left anterolateral third   rib.      -Personally reviewed: CT BRAIN ORDERED BY: MARITO HARRIS    PROCEDURE DATE: 03/31/2025  INTERPRETATION: CLINICAL INFORMATION: fall  FINDINGS:  VENTRICLES AND SULCI: Age appropriate involutional changes.  INTRA-AXIAL: No mass effect, acute hemorrhage, or midline shift. There are periventricular and subcortical white matter hypodensities, consistent with microvascular type changes. Chronic infarcts versus prominent perivascular space in the left basal ganglia.  EXTRA-AXIAL: No mass or fluid collection. Basal cisterns are normal in appearance.  VISUALIZED SINUSES: Scattered mucosal thickening.  TYMPANOMASTOID CAVITIES: Clear.  VISUALIZED ORBITS: Bilateral lens replacement.  CALVARIUM: Intact.  MISCELLANEOUS: None.  IMPRESSION:  1. No acute intracranial hemorrhage, mass effect, or midline shift.  2. Chronic small vessel disease.

## 2025-04-01 NOTE — H&P ADULT - TIME BILLING
Reviewed admission imaging reports, and admission labs prior to the encounter with  the patient   Reviewed Triage and ED course/ documentation   Requested SW consultation note  Reviewed prior admission consultants notes, including::: BH and IM progress notes from 11/2022 due to lack of collateral at the admission time   Performed full physical exam and ROS  Attempted twice to contact NH for collateral and home medications via phone  Discussed in simple terms prognosis, treatment and SW plans to arrange for new Rehab placement with the patient  Ordered or reviewed multiple new admission medications and placed or reviewed all hospitalization admission orders  Managed (continued, held or adjusted doses) of home medications   Ordered  or reviewed orders of  new imaging and new lab w/up

## 2025-04-01 NOTE — H&P ADULT - PROBLEM SELECTOR PLAN 6
Lovenox sq -Primary team to reach out to [facility]  regarding current medications during regular operation hours. Patient was brought to ER by son and daughter from [facility] asking to transfer to [facility]. Per son Nadeem ([phone]) pt is accepted at [facility]. ASTRID spoke with Ewa (Admissions)  [phone] at [facility] and was told pt can not be accepted tonight. Pt has to meet Medicare Requirement of 3 Nights Stay in the Hospital prior to Nursing Home Placement.     -f/u ASTRID c/s

## 2025-04-01 NOTE — H&P ADULT - NSHPPHYSICALEXAM_GEN_ALL_CORE
Vital Signs Last 24 Hrs  T(C): 37 (01 Apr 2025 05:00), Max: 37.3 (31 Mar 2025 15:51)  T(F): 98.6 (01 Apr 2025 05:00), Max: 99.1 (31 Mar 2025 15:51)  HR: 62 (01 Apr 2025 05:00) (62 - 82)  BP: 127/73 (01 Apr 2025 05:00) (121/73 - 143/70)  BP(mean): --  RR: 16 (01 Apr 2025 05:00) (16 - 18)  SpO2: 100% (01 Apr 2025 05:00) (100% - 100%)    Parameters below as of 01 Apr 2025 05:00  Patient On (Oxygen Delivery Method): room air

## 2025-04-01 NOTE — CHART NOTE - NSCHARTNOTEFT_GEN_A_CORE
Patient seen and examined on 4/1  Patient reporting some shoulder pain but has full ROM   Xray shoulder negative   Called [facility], spoke to admin:   PMH: CVA, HTN, schizophrenia, tardive dyskinesia, spinal injury   All: Aspirin   Meds: Amlodipine 5, Amantadine 200mg BID, melatonin 3mg Qhs. Asked if patient still takes Ingrezza, admin replied no   Per [facility], Ingrezza has been filled for almost a year, most recently in March   Spoke to daughter who confirms patient has been on Ingrezza   Med rec completed   Diet at NH: Soft + thin liquids + Ensure daily   Patient was at [facility] for 1 year, family wants patient to be transferred to [facility]. Has been in contact with admissions department and reports patient has been accepted but SW from [facility] did not call over to [facility]   Notified ASTRID and CM on unit

## 2025-04-01 NOTE — H&P ADULT - ASSESSMENT
75-year-old female past medical history of paranoid schizophrenia, tardive dyskinesia, Vit D deficiency, pre-DM, prior falls c/b sternal fracture.  75-year-old female past medical history of paranoid schizophrenia, tardive dyskinesia, Vit D deficiency, pre-DM, prior falls c/b sternal fracture a/w dysequilibrium fall c/b L shoulder pain. [facility] at the time of night admission regarding medical information.

## 2025-04-01 NOTE — H&P ADULT - PROBLEM SELECTOR PLAN 1
Low suspicion for infectious etiology; Likely multifactorial due to TD, age.   CXR not c/w PNA  CT head: no acute findings     -f/u UA to r/o UTI  -f/u Urine tox to r/o metabolic/ toxic etiology   -PT eval  -Fall precautions

## 2025-04-01 NOTE — H&P ADULT - PROBLEM SELECTOR PLAN 4
No evidence of disorganization or agitation. Cooperative. No evidence of disorganization or agitation. Cooperative.    -No need for 1 : 1 monitoring at this time   -Restart the current regimen once medrec from the facility available (primary team)   -Ordered screening EKG to r/o QT prolongation (pending)

## 2025-04-01 NOTE — H&P ADULT - PROBLEM SELECTOR PLAN 3
Xray: No evidence of acute fracture or dislocation. Mild glenohumeral   arthrosis. Moderate acromial clavicular joint arthrosis.    -Pain control:  Lidocaine patch daily  Tylenol standing daily x 3 days   Oxycodone low dose 2.5mg prn moderate pain   Senna

## 2025-04-01 NOTE — H&P ADULT - HISTORY OF PRESENT ILLNESS
75-year-old female past medical history of paranoid schizophrenia, tardive dyskinesia on Haldol 2 mg twice daily [place] [facility] [place] pain after a fall.  Patient had unwitnessed fall 4 days ago, was in the shower went to bend down, slipped struck left shoulder, denies head strike. Repots no LOC, was ambulatory afterwards.  Complaining of persistent left shoulder pain endorsed to daughter today prompting visit to ED.  Otherwise reports no prodromal symptoms prior. Also reports no fevers, chills, nausea, vomiting, dizziness, chest pain, SOB, abdominal pain, dysuria, hematuria.   75-year-old female past medical history of paranoid schizophrenia, tardive dyskinesia, Vit D deficiency, pre-DM, prior falls c/b sternal fracture. [place] [facility] [place] a [place].  Patient had unwitnessed fall 4 days ago, was in the shower went to bend down, slipped struck left shoulder, denies head strike. Repots no LOC, was ambulatory afterwards.  Complaining of persistent left shoulder pain endorsed to daughter today prompting visit to ED.  Otherwise reports no prodromal symptoms prior. Also reports no fevers, chills, nausea, vomiting, dizziness, chest pain, SOB, abdominal pain, dysuria, hematuria.  Of note, per MSW note the patient was brought to ER by son and daughter from [facility] asking to transfer to [facility]. Per son Nadeem ([phone]) pt is accepted at [facility]. SW spoke with Ewa (Admissions)  [phone] at [facility] and was told pt can not be accepted tonight. Pt has to meet Medicare Requirement of 3 Nights Stay in the Hospital prior to Nursing Home Placement.     ED course: No transfer documents from [facility] provided in the chart.  75-year-old female past medical history of paranoid schizophrenia, tardive dyskinesia, Vit D deficiency, pre-DM, prior falls c/b sternal fracture. [place] [facility] [place] [facility] a [place].  Patient had unwitnessed fall 4 days ago, was in the shower went to bend down, slipped struck left shoulder, denies head strike. Repots no LOC, was ambulatory afterwards.  Complaining of persistent left shoulder pain endorsed to daughter today prompting visit to ED.  Otherwise reports no prodromal symptoms prior. Also reports no fevers, chills, nausea, vomiting, dizziness, chest pain, SOB, abdominal pain, dysuria, hematuria.  Of note, per MSW note the patient was brought to ER by son and daughter from [facility] asking to transfer to [facility]. Per son Nadeem ([phone]) pt is accepted at [facility]. SW spoke with Ewa (Admissions)  [phone] at [facility] and was told pt can not be accepted tonight. Pt has to meet Medicare Requirement of 3 Nights Stay in the Hospital prior to Nursing Home Placement.     ED course: No transfer documents from [facility] provided in the chart.     Addendum: attempted twice to reach  [facility] ([facility]) to obtain PMHx, collateral and Medrec with diet for the patient  via phone [phone] (no phone or any documentation provided with the chart) . Was unsuccessful contacting administration and then subsequently supervisor  through the  at appx 6:00am.  75-year-old female past medical history of paranoid schizophrenia, tardive dyskinesia, Vit D deficiency, pre-DM, prior falls c/b sternal fracture. [facility].  Patient had unwitnessed fall 4 days ago, was in the shower went to bend down, slipped struck left shoulder, denies head strike. Repots no LOC, was ambulatory afterwards.  Complaining of persistent left shoulder pain endorsed to daughter today prompting visit to ED.  Otherwise reports no prodromal symptoms prior. Also reports no fevers, chills,  SOB, cough, nausea, vomiting, abdominal pain, diarrhea or dysuria .  Of note, per MSW note the patient was brought to ER by son and daughter from [facility] asking to transfer to [facility]. Per son Nadeem ([phone]) pt is accepted at [facility]. SW spoke with Ewa (Admissions)  [phone] at [facility] and was told pt can not be accepted tonight. Pt has to meet Medicare Requirement of 3 Nights Stay in the Hospital prior to Nursing Home Placement.     ED course: No transfer documents from [facility] provided in the chart.     Addendum: Writer attempted to reach  [facility] ([facility]) twice to obtain PMHx, collateral and Medrec with diet for the patient  via phone [phone] (no phone or any documentation provided with the chart) . Was unsuccessful contacting administration and then subsequently supervisor through the  at appx 5:50am. Did not leave voicemail.  75-year-old female past medical history of paranoid schizophrenia, tardive dyskinesia, Vit D deficiency, pre-DM, prior falls c/b sternal fracture. [facility].  Patient had unwitnessed fall 4 days ago, was in the shower went to bend down, slipped struck left shoulder, denies head strike. Repots no LOC, was ambulatory afterwards.  Complaining of persistent left shoulder pain endorsed to daughter today prompting visit to ED.  Otherwise reports no prodromal symptoms prior. Also reports no fevers, chills,  SOB, cough, nausea, vomiting, abdominal pain, diarrhea or dysuria. Pt states that she eats "soft" diet at the current facility.   Of note, per MSW note the patient was brought to ER by son and daughter from [facility] asking to transfer to [facility]. Per son Nadeem ([phone]) pt is accepted at [facility]. SW spoke with Ewa (Admissions)  [phone] at [facility] and was told pt can not be accepted tonight. Pt has to meet Medicare Requirement of 3 Nights Stay in the Hospital prior to Nursing Home Placement.     ED course: No transfer documents from [facility] provided in the chart.     Addendum: Writer attempted to reach  [facility] ([facility]) twice to obtain PMHx, collateral and Medrec with diet for the patient  via phone [phone] (no phone or any documentation provided with the chart) . Was unsuccessful contacting administration and then subsequently supervisor through the  at appx 5:50am. Did not leave voicemail.

## 2025-04-01 NOTE — PHYSICAL THERAPY INITIAL EVALUATION ADULT - ADDITIONAL COMMENTS
Patient resides in assisted living, required assist with ADL and independent in ambulation prior to admission.

## 2025-04-01 NOTE — H&P ADULT - PROBLEM SELECTOR PLAN 7
Lovenox sq -Primary team to reach out to [facility]  regarding current medications during regular operation hours.

## 2025-04-01 NOTE — PATIENT PROFILE ADULT - FALL HARM RISK - HARM RISK INTERVENTIONS
Assistance with ambulation/Assistance OOB with selected safe patient handling equipment/Communicate Risk of Fall with Harm to all staff/Discuss with provider need for PT consult/Monitor gait and stability/Provide patient with walking aids - walker, cane, crutches/Reinforce activity limits and safety measures with patient and family/Tailored Fall Risk Interventions/Use of alarms - bed, chair and/or voice tab/Visual Cue: Yellow wristband and red socks/Bed in lowest position, wheels locked, appropriate side rails in place/Call bell, personal items and telephone in reach/Instruct patient to call for assistance before getting out of bed or chair/Non-slip footwear when patient is out of bed/[place] to call system/Physically safe environment - no spills, clutter or unnecessary equipment/Purposeful Proactive Rounding/Room/bathroom lighting operational, light cord in reach

## 2025-04-01 NOTE — H&P ADULT - PROBLEM SELECTOR PLAN 5
Patient was brought to ER by son and daughter from [facility] asking to transfer to [facility]. Per son Nadeem ([phone]) pt is accepted at [facility]. ASTRID spoke with Ewa (Admissions)  [phone] at [facility] and was told pt can not be accepted tonight. Pt has to meet Medicare Requirement of 3 Nights Stay in the Hospital prior to Nursing Home Placement.     -f/u ASTRID c/s Aspiration precautions  HOB elev with meals  Conservative consistence diet for now until diet confirmed with NH (primary team)

## 2025-04-02 ENCOUNTER — TRANSCRIPTION ENCOUNTER (OUTPATIENT)
Age: 75
End: 2025-04-02

## 2025-04-02 LAB — PCP UR-MCNC: NEGATIVE — SIGNIFICANT CHANGE UP

## 2025-04-02 PROCEDURE — 99232 SBSQ HOSP IP/OBS MODERATE 35: CPT

## 2025-04-02 RX ORDER — LIDOCAINE HYDROCHLORIDE 20 MG/ML
1 JELLY TOPICAL
Qty: 0 | Refills: 0 | DISCHARGE
Start: 2025-04-02

## 2025-04-02 RX ADMIN — AMLODIPINE BESYLATE 5 MILLIGRAM(S): 10 TABLET ORAL at 06:11

## 2025-04-02 RX ADMIN — VALBENAZINE 80 MILLIGRAM(S): 80 CAPSULE ORAL at 13:03

## 2025-04-02 RX ADMIN — LIDOCAINE HYDROCHLORIDE 1 PATCH: 20 JELLY TOPICAL at 19:49

## 2025-04-02 RX ADMIN — Medication 200 MILLIGRAM(S): at 06:12

## 2025-04-02 RX ADMIN — Medication 200 MILLIGRAM(S): at 17:43

## 2025-04-02 RX ADMIN — ENOXAPARIN SODIUM 40 MILLIGRAM(S): 100 INJECTION SUBCUTANEOUS at 13:03

## 2025-04-02 RX ADMIN — LIDOCAINE HYDROCHLORIDE 1 PATCH: 20 JELLY TOPICAL at 13:02

## 2025-04-02 NOTE — PROGRESS NOTE ADULT - PROBLEM SELECTOR PLAN 1
Low suspicion for infectious etiology; Likely multifactorial due to TD, age.   - CXR not c/w PNA  - CT head: no acute findings   - U/A negative   - Utox negative   - PT: At baseline   - Fall precautions

## 2025-04-02 NOTE — DISCHARGE NOTE PROVIDER - NSDCCPCAREPLAN_GEN_ALL_CORE_FT
PRINCIPAL DISCHARGE DIAGNOSIS  Diagnosis: Muscle contusion  Assessment and Plan of Treatment: You came into the hospital because you fell and you had shoulder pain. You underwent Xrays which were negative.   You requested to change facilities, you were discharged to [facility].

## 2025-04-02 NOTE — DISCHARGE NOTE PROVIDER - CARE PROVIDER_API CALL
Isabell Jason  Family Medicine  [address], [address]  [place] [address]  Phone: ([phone]  Fax: ()-  Follow Up Time: 1 week

## 2025-04-02 NOTE — DISCHARGE NOTE PROVIDER - ATTENDING DISCHARGE PHYSICAL EXAMINATION:
GEN: NAD, sitting up in chair   CV: RRR, no m/r/g   Resp: CTABL, no w/r/r   Abd: Soft, non-tender, non-distended   Ext: No LE edema   Neuro: ++ Tardive dyskinesia     Patient feels well, no complaints

## 2025-04-02 NOTE — PROGRESS NOTE ADULT - SUBJECTIVE AND OBJECTIVE BOX
Merlin Mathew, MD   Hospitalist  Pager #[phone]    PROGRESS NOTE:     Patient is a 75y old  Female who presents with a chief complaint of Fall, shoulder pain, left (2025 05:05)      SUBJECTIVE / OVERNIGHT EVENTS:  NEON   Patient feels fine, no complaints   ADDITIONAL REVIEW OF SYSTEMS:    MEDICATIONS  (STANDING):  amantadine 200 milliGRAM(s) Oral two times a day  amLODIPine   Tablet 5 milliGRAM(s) Oral daily  enoxaparin Injectable 40 milliGRAM(s) SubCutaneous every 24 hours  influenza  Vaccine (HIGH DOSE) 0.5 milliLiter(s) IntraMuscular once  lidocaine   4% Patch 1 Patch Transdermal daily  valbenazine Capsule 80 milliGRAM(s) Oral daily    MEDICATIONS  (PRN):  acetaminophen     Tablet .. 650 milliGRAM(s) Oral every 6 hours PRN Temp greater or equal to 38C (100.4F), Mild Pain (1 - 3)  melatonin 3 milliGRAM(s) Oral at bedtime PRN Insomnia  ondansetron Injectable 4 milliGRAM(s) IV Push every 8 hours PRN Nausea and/or Vomiting  oxyCODONE    IR 2.5 milliGRAM(s) Oral every 6 hours PRN Moderate Pain (4 - 6)      CAPILLARY BLOOD GLUCOSE        I&O's Summary      PHYSICAL EXAM:  Vital Signs Last 24 Hrs  T(C): 36.4 (2025 11:28), Max: 36.8 (2025 21:00)  T(F): 97.5 (2025 11:28), Max: 98.3 (2025 21:00)  HR: 65 (2025 11:28) (65 - 89)  BP: 115/65 (2025 11:28) (111/79 - 157/79)  BP(mean): --  RR: 18 (2025 11:28) (18 - 18)  SpO2: 95% (2025 11:28) (95% - 100%)    Parameters below as of 2025 11:28  Patient On (Oxygen Delivery Method): room air        CONSTITUTIONAL: NAD, resting comfortably   RESPIRATORY: Normal respiratory effort; lungs are clear to auscultation bilaterally  CARDIOVASCULAR: Regular rate and rhythm, normal S1 and S2, no murmur/rub/gallop; No lower extremity edema   ABDOMEN: Nontender to palpation, normoactive bowel sounds, no rebound/guarding; No hepatosplenomegaly  MUSCULOSKELETAL no clubbing or cyanosis of digits; no joint swelling or tenderness to palpation + Tardive dyskinesia   PSYCH: Alert, answering questions     LABS:                        12.3   5.14  )-----------( 249      ( 31 Mar 2025 19:00 )             38.8         141  |  104  |  18  ----------------------------<  114[H]  4.3   |  25  |  0.91    Ca    9.5      31 Mar 2025 19:55    TPro  7.9  /  Alb  3.8  /  TBili  0.3  /  DBili  x   /  AST  55[H]  /  ALT  21  /  AlkPhos  112            Urinalysis Basic - ( 2025 11:25 )    Color: Yellow / Appearance: Clear / S.014 / pH: x  Gluc: x / Ketone: Negative mg/dL  / Bili: Negative / Urobili: 1.0 mg/dL   Blood: x / Protein: Negative mg/dL / Nitrite: Negative   Leuk Esterase: Trace / RBC: 0 /HPF / WBC 2 /HPF   Sq Epi: x / Non Sq Epi: 0 /HPF / Bacteria: Negative /HPF          RADIOLOGY & ADDITIONAL TESTS:  Results Reviewed:   Imaging Personally Reviewed:  Electrocardiogram Personally Reviewed:    COORDINATION OF CARE:  Care Discussed with Consultants/Other Providers [Y/N]:  Prior or Outpatient Records Reviewed [Y/N]:

## 2025-04-02 NOTE — PROGRESS NOTE ADULT - ASSESSMENT
75-year-old female past medical history of paranoid schizophrenia, tardive dyskinesia, Vit D deficiency, pre-DM, prior falls c/b sternal fracture a/w dysequilibrium fall c/b L shoulder pain. [facility] at the time of night admission regarding medical information.

## 2025-04-02 NOTE — DISCHARGE NOTE PROVIDER - HOSPITAL COURSE
75 y.o. F w/ a hx of paranoid schizophrenia, tardive dyskinesia, Vit D deficiency, pre-DM, prior falls c/b sternal fracture who presents for left shoulder pain after a fall.   Patient is currently at [facility], fell while bending down to pick something up. Patient was found down, had shoulder pain so she underwent Xray. Family was concerned about falls, shoulder pain so they brought her to the hospital.     Hospital Course:   Patient underwent Xray shoulder which was negative. Family requested placement at new facility. Patient was accepted to [facility] and was discharged.

## 2025-04-02 NOTE — PROGRESS NOTE ADULT - PROBLEM SELECTOR PLAN 6
Patient was brought to ER by son and daughter from [facility] asking to transfer to [facility]. Per son Nadeem ([phone]) pt is accepted at [facility].   - Accepted to [facility], has to complete 3 midnight stay

## 2025-04-02 NOTE — DISCHARGE NOTE PROVIDER - NSDCCPTREATMENT_GEN_ALL_CORE_FT
PRINCIPAL PROCEDURE  Procedure: XR shoulder LT 2V  Findings and Treatment: IMPRESSION:  No evidence of acute fracture or dislocation. Mild glenohumeral   arthrosis. Moderate acromial clavicular joint arthrosis. These change   along the greater tuberosity. Loose body in the subacromial/subdeltoid   bursal region. Chronic healed deformity of the left anterolateral third   rib.      SECONDARY PROCEDURE  Procedure: Head CT  Findings and Treatment: FINDINGS:  VENTRICLES AND SULCI: Age appropriate involutional changes.  INTRA-AXIAL: No mass effect, acute hemorrhage, or midline shift.  There   are periventricular and subcortical white matter hypodensities,   consistent with microvascular type changes. Chronic infarcts versus   prominent perivascular space in the left basal ganglia.  EXTRA-AXIAL: No mass or fluid collection. Basal cisterns are normal in   appearance.  VISUALIZED SINUSES:  Scattered mucosal thickening.  TYMPANOMASTOID CAVITIES:  Clear.  VISUALIZED ORBITS: Bilateral lens replacement.  CALVARIUM: Intact.  MISCELLANEOUS: None.  IMPRESSION:  1.  No acute intracranial hemorrhage, mass effect, or midline shift.  2.  Chronic small vessel disease.

## 2025-04-03 ENCOUNTER — TRANSCRIPTION ENCOUNTER (OUTPATIENT)
Age: 75
End: 2025-04-03

## 2025-04-03 VITALS
HEART RATE: 101 BPM | SYSTOLIC BLOOD PRESSURE: 132 MMHG | RESPIRATION RATE: 18 BRPM | OXYGEN SATURATION: 95 % | TEMPERATURE: 98 F | DIASTOLIC BLOOD PRESSURE: 73 MMHG

## 2025-04-03 PROCEDURE — 99238 HOSP IP/OBS DSCHRG MGMT 30/<: CPT

## 2025-04-03 RX ADMIN — AMLODIPINE BESYLATE 5 MILLIGRAM(S): 10 TABLET ORAL at 05:38

## 2025-04-03 RX ADMIN — VALBENAZINE 80 MILLIGRAM(S): 80 CAPSULE ORAL at 11:17

## 2025-04-03 RX ADMIN — Medication 200 MILLIGRAM(S): at 05:38

## 2025-04-03 RX ADMIN — LIDOCAINE HYDROCHLORIDE 1 PATCH: 20 JELLY TOPICAL at 11:16

## 2025-04-03 RX ADMIN — LIDOCAINE HYDROCHLORIDE 1 PATCH: 20 JELLY TOPICAL at 01:34

## 2025-04-03 RX ADMIN — ENOXAPARIN SODIUM 40 MILLIGRAM(S): 100 INJECTION SUBCUTANEOUS at 11:17

## 2025-04-03 NOTE — DISCHARGE NOTE NURSING/CASE MANAGEMENT/SOCIAL WORK - NSDPFAC_GEN_ALL_CORE
[facility]//Address: [address] [place] [id number]//Phone: [phone]

## 2025-04-03 NOTE — DISCHARGE NOTE NURSING/CASE MANAGEMENT/SOCIAL WORK - NSDCPEFALRISK_GEN_ALL_CORE
For information on Fall & Injury Prevention, visit: [email or web address] OR  [email or web address] OR  https://www.cdc.gov/steadi/patient.html

## 2025-04-03 NOTE — DISCHARGE NOTE NURSING/CASE MANAGEMENT/SOCIAL WORK - PATIENT PORTAL LINK FT
You can access the FollowMyHealth Patient Portal offered by [facility] by registering at the following website: [email or web address]. By joining [facility]’s FollowMyHealth portal, you will also be able to view your health information using other applications (apps) compatible with our system.

## 2025-04-03 NOTE — DISCHARGE NOTE NURSING/CASE MANAGEMENT/SOCIAL WORK - FINANCIAL ASSISTANCE
[facility] provides services at a reduced cost to those who are determined to be eligible through [facility]’s financial assistance program. Information regarding [facility]’s financial assistance program can be found by going to [email or web address] or by calling [phone].

## (undated) DEVICE — GLV 7.5 PROTEXIS (WHITE)

## (undated) DEVICE — WARMING BLANKET LOWER ADULT

## (undated) DEVICE — DRSG TEGADERM 2.5X3"

## (undated) DEVICE — CAPSULE GUARD I/A

## (undated) DEVICE — PACK OPTH CATARACT

## (undated) DEVICE — VENODYNE/SCD SLEEVE CALF MEDIUM

## (undated) DEVICE — NDL REROBULBAR ATKINSON 23G X 1.5"

## (undated) DEVICE — CARTRIDGE ALCON MONARCH III "D"

## (undated) DEVICE — SOL IRR BAG BSS 500ML

## (undated) DEVICE — SOL IRR BAL SALT + 500ML